# Patient Record
Sex: MALE | Race: WHITE | NOT HISPANIC OR LATINO | URBAN - METROPOLITAN AREA
[De-identification: names, ages, dates, MRNs, and addresses within clinical notes are randomized per-mention and may not be internally consistent; named-entity substitution may affect disease eponyms.]

---

## 2017-09-11 ENCOUNTER — EMERGENCY (EMERGENCY)
Facility: HOSPITAL | Age: 77
LOS: 0 days | Discharge: HOME | End: 2017-09-11
Attending: EMERGENCY MEDICINE | Admitting: EMERGENCY MEDICINE
Payer: MEDICARE

## 2017-09-11 VITALS
TEMPERATURE: 99 F | OXYGEN SATURATION: 98 % | DIASTOLIC BLOOD PRESSURE: 87 MMHG | RESPIRATION RATE: 17 BRPM | SYSTOLIC BLOOD PRESSURE: 167 MMHG | HEART RATE: 84 BPM

## 2017-09-11 VITALS — WEIGHT: 205.03 LBS | HEIGHT: 72 IN

## 2017-09-11 DIAGNOSIS — I25.10 ATHEROSCLEROTIC HEART DISEASE OF NATIVE CORONARY ARTERY WITHOUT ANGINA PECTORIS: ICD-10-CM

## 2017-09-11 DIAGNOSIS — N40.0 BENIGN PROSTATIC HYPERPLASIA WITHOUT LOWER URINARY TRACT SYMPTOMS: ICD-10-CM

## 2017-09-11 DIAGNOSIS — N13.2 HYDRONEPHROSIS WITH RENAL AND URETERAL CALCULOUS OBSTRUCTION: ICD-10-CM

## 2017-09-11 DIAGNOSIS — Z98.89 OTHER SPECIFIED POSTPROCEDURAL STATES: Chronic | ICD-10-CM

## 2017-09-11 DIAGNOSIS — Z98.49 CATARACT EXTRACTION STATUS, UNSPECIFIED EYE: Chronic | ICD-10-CM

## 2017-09-11 DIAGNOSIS — Z79.84 LONG TERM (CURRENT) USE OF ORAL HYPOGLYCEMIC DRUGS: ICD-10-CM

## 2017-09-11 DIAGNOSIS — Z79.899 OTHER LONG TERM (CURRENT) DRUG THERAPY: ICD-10-CM

## 2017-09-11 DIAGNOSIS — R10.84 GENERALIZED ABDOMINAL PAIN: ICD-10-CM

## 2017-09-11 DIAGNOSIS — I10 ESSENTIAL (PRIMARY) HYPERTENSION: ICD-10-CM

## 2017-09-11 DIAGNOSIS — Z95.5 PRESENCE OF CORONARY ANGIOPLASTY IMPLANT AND GRAFT: Chronic | ICD-10-CM

## 2017-09-11 DIAGNOSIS — E11.9 TYPE 2 DIABETES MELLITUS WITHOUT COMPLICATIONS: ICD-10-CM

## 2017-09-11 DIAGNOSIS — E78.5 HYPERLIPIDEMIA, UNSPECIFIED: ICD-10-CM

## 2017-09-11 LAB
ALBUMIN SERPL ELPH-MCNC: 3.7 G/DL — SIGNIFICANT CHANGE UP (ref 3.3–5)
ALP SERPL-CCNC: 87 U/L — SIGNIFICANT CHANGE UP (ref 40–120)
ALT FLD-CCNC: 19 U/L — SIGNIFICANT CHANGE UP (ref 12–78)
ANION GAP SERPL CALC-SCNC: 7 MMOL/L — SIGNIFICANT CHANGE UP (ref 5–17)
APPEARANCE UR: CLEAR — SIGNIFICANT CHANGE UP
AST SERPL-CCNC: 16 U/L — SIGNIFICANT CHANGE UP (ref 15–37)
BACTERIA # UR AUTO: (no result)
BASOPHILS # BLD AUTO: 0.1 K/UL — SIGNIFICANT CHANGE UP (ref 0–0.2)
BASOPHILS NFR BLD AUTO: 0.7 % — SIGNIFICANT CHANGE UP (ref 0–2)
BILIRUB SERPL-MCNC: 0.3 MG/DL — SIGNIFICANT CHANGE UP (ref 0.2–1.2)
BILIRUB UR-MCNC: NEGATIVE — SIGNIFICANT CHANGE UP
BUN SERPL-MCNC: 22 MG/DL — SIGNIFICANT CHANGE UP (ref 7–23)
CALCIUM SERPL-MCNC: 9.4 MG/DL — SIGNIFICANT CHANGE UP (ref 8.5–10.1)
CHLORIDE SERPL-SCNC: 107 MMOL/L — SIGNIFICANT CHANGE UP (ref 96–108)
CO2 SERPL-SCNC: 27 MMOL/L — SIGNIFICANT CHANGE UP (ref 22–31)
COLOR SPEC: YELLOW — SIGNIFICANT CHANGE UP
CREAT SERPL-MCNC: 1.21 MG/DL — SIGNIFICANT CHANGE UP (ref 0.5–1.3)
DIFF PNL FLD: (no result)
EOSINOPHIL # BLD AUTO: 0.1 K/UL — SIGNIFICANT CHANGE UP (ref 0–0.5)
EOSINOPHIL NFR BLD AUTO: 0.4 % — SIGNIFICANT CHANGE UP (ref 0–6)
EPI CELLS # UR: SIGNIFICANT CHANGE UP
GLUCOSE SERPL-MCNC: 178 MG/DL — HIGH (ref 70–99)
GLUCOSE UR QL: NEGATIVE MG/DL — SIGNIFICANT CHANGE UP
HCT VFR BLD CALC: 47 % — SIGNIFICANT CHANGE UP (ref 39–50)
HGB BLD-MCNC: 16 G/DL — SIGNIFICANT CHANGE UP (ref 13–17)
KETONES UR-MCNC: NEGATIVE — SIGNIFICANT CHANGE UP
LEUKOCYTE ESTERASE UR-ACNC: (no result)
LYMPHOCYTES # BLD AUTO: 1.5 K/UL — SIGNIFICANT CHANGE UP (ref 1–3.3)
LYMPHOCYTES # BLD AUTO: 11.5 % — LOW (ref 13–44)
MCHC RBC-ENTMCNC: 29.8 PG — SIGNIFICANT CHANGE UP (ref 27–34)
MCHC RBC-ENTMCNC: 34.1 GM/DL — SIGNIFICANT CHANGE UP (ref 32–36)
MCV RBC AUTO: 87.3 FL — SIGNIFICANT CHANGE UP (ref 80–100)
MONOCYTES # BLD AUTO: 0.9 K/UL — SIGNIFICANT CHANGE UP (ref 0–0.9)
MONOCYTES NFR BLD AUTO: 7.3 % — SIGNIFICANT CHANGE UP (ref 2–14)
NEUTROPHILS # BLD AUTO: 10.2 K/UL — HIGH (ref 1.8–7.4)
NEUTROPHILS NFR BLD AUTO: 80.1 % — HIGH (ref 43–77)
NITRITE UR-MCNC: NEGATIVE — SIGNIFICANT CHANGE UP
PH UR: 5 — SIGNIFICANT CHANGE UP (ref 5–8)
PLATELET # BLD AUTO: 193 K/UL — SIGNIFICANT CHANGE UP (ref 150–400)
POTASSIUM SERPL-MCNC: 4.3 MMOL/L — SIGNIFICANT CHANGE UP (ref 3.5–5.3)
POTASSIUM SERPL-SCNC: 4.3 MMOL/L — SIGNIFICANT CHANGE UP (ref 3.5–5.3)
PROT SERPL-MCNC: 7.6 GM/DL — SIGNIFICANT CHANGE UP (ref 6–8.3)
PROT UR-MCNC: 30 MG/DL
RBC # BLD: 5.39 M/UL — SIGNIFICANT CHANGE UP (ref 4.2–5.8)
RBC # FLD: 13.3 % — SIGNIFICANT CHANGE UP (ref 10.3–14.5)
RBC CASTS # UR COMP ASSIST: (no result) /HPF (ref 0–4)
SODIUM SERPL-SCNC: 141 MMOL/L — SIGNIFICANT CHANGE UP (ref 135–145)
SP GR SPEC: 1.02 — SIGNIFICANT CHANGE UP (ref 1.01–1.02)
UROBILINOGEN FLD QL: NEGATIVE MG/DL — SIGNIFICANT CHANGE UP
WBC # BLD: 12.8 K/UL — HIGH (ref 3.8–10.5)
WBC # FLD AUTO: 12.8 K/UL — HIGH (ref 3.8–10.5)
WBC UR QL: (no result)

## 2017-09-11 PROCEDURE — 99285 EMERGENCY DEPT VISIT HI MDM: CPT

## 2017-09-11 PROCEDURE — 74176 CT ABD & PELVIS W/O CONTRAST: CPT | Mod: 26

## 2017-09-11 RX ORDER — HYDROMORPHONE HYDROCHLORIDE 2 MG/ML
1 INJECTION INTRAMUSCULAR; INTRAVENOUS; SUBCUTANEOUS ONCE
Qty: 0 | Refills: 0 | Status: DISCONTINUED | OUTPATIENT
Start: 2017-09-11 | End: 2017-09-11

## 2017-09-11 RX ORDER — OXYCODONE HYDROCHLORIDE 5 MG/1
1 TABLET ORAL
Qty: 15 | Refills: 0 | OUTPATIENT
Start: 2017-09-11

## 2017-09-11 RX ORDER — OXYCODONE HYDROCHLORIDE 5 MG/1
5 TABLET ORAL ONCE
Qty: 0 | Refills: 0 | Status: DISCONTINUED | OUTPATIENT
Start: 2017-09-11 | End: 2017-09-11

## 2017-09-11 RX ORDER — ONDANSETRON 8 MG/1
1 TABLET, FILM COATED ORAL
Qty: 12 | Refills: 0
Start: 2017-09-11

## 2017-09-11 RX ORDER — SODIUM CHLORIDE 9 MG/ML
1000 INJECTION INTRAMUSCULAR; INTRAVENOUS; SUBCUTANEOUS ONCE
Qty: 0 | Refills: 0 | Status: COMPLETED | OUTPATIENT
Start: 2017-09-11 | End: 2017-09-11

## 2017-09-11 RX ORDER — CEFTRIAXONE 500 MG/1
1 INJECTION, POWDER, FOR SOLUTION INTRAMUSCULAR; INTRAVENOUS ONCE
Qty: 0 | Refills: 0 | Status: COMPLETED | OUTPATIENT
Start: 2017-09-11 | End: 2017-09-11

## 2017-09-11 RX ORDER — MORPHINE SULFATE 50 MG/1
4 CAPSULE, EXTENDED RELEASE ORAL ONCE
Qty: 0 | Refills: 0 | Status: DISCONTINUED | OUTPATIENT
Start: 2017-09-11 | End: 2017-09-11

## 2017-09-11 RX ORDER — ONDANSETRON 8 MG/1
4 TABLET, FILM COATED ORAL ONCE
Qty: 0 | Refills: 0 | Status: COMPLETED | OUTPATIENT
Start: 2017-09-11 | End: 2017-09-11

## 2017-09-11 RX ORDER — CEPHALEXIN 500 MG
1 CAPSULE ORAL
Qty: 28 | Refills: 0 | OUTPATIENT
Start: 2017-09-11 | End: 2017-09-18

## 2017-09-11 RX ADMIN — SODIUM CHLORIDE 1000 MILLILITER(S): 9 INJECTION INTRAMUSCULAR; INTRAVENOUS; SUBCUTANEOUS at 01:20

## 2017-09-11 RX ADMIN — ONDANSETRON 4 MILLIGRAM(S): 8 TABLET, FILM COATED ORAL at 01:23

## 2017-09-11 RX ADMIN — OXYCODONE HYDROCHLORIDE 5 MILLIGRAM(S): 5 TABLET ORAL at 04:57

## 2017-09-11 RX ADMIN — MORPHINE SULFATE 4 MILLIGRAM(S): 50 CAPSULE, EXTENDED RELEASE ORAL at 03:00

## 2017-09-11 RX ADMIN — HYDROMORPHONE HYDROCHLORIDE 1 MILLIGRAM(S): 2 INJECTION INTRAMUSCULAR; INTRAVENOUS; SUBCUTANEOUS at 03:00

## 2017-09-11 RX ADMIN — HYDROMORPHONE HYDROCHLORIDE 1 MILLIGRAM(S): 2 INJECTION INTRAMUSCULAR; INTRAVENOUS; SUBCUTANEOUS at 02:19

## 2017-09-11 RX ADMIN — MORPHINE SULFATE 4 MILLIGRAM(S): 50 CAPSULE, EXTENDED RELEASE ORAL at 01:22

## 2017-09-11 RX ADMIN — CEFTRIAXONE 100 GRAM(S): 500 INJECTION, POWDER, FOR SOLUTION INTRAMUSCULAR; INTRAVENOUS at 03:10

## 2017-09-11 NOTE — ED ADULT NURSE NOTE - OBJECTIVE STATEMENT
Pt with h/o kidney stones comes in c/o increasing left flank pain x 2 days - pain is 10/10 and radiates to front - pt also c/o nausea.  Denies hematuria or difficulty urinating

## 2017-09-11 NOTE — ED PROVIDER NOTE - GASTROINTESTINAL, MLM
Minimal tenderness to left abd and left flank, no guarding, rebound or peritoneal signs, no pulsating abd masses.

## 2017-09-11 NOTE — ED PROVIDER NOTE - MEDICAL DECISION MAKING DETAILS
Reevaluated patient at bedside.  Patient feeling much improved.  Discussed the results of all diagnostic testing in ED and copies of all reports given.   An opportunity to ask questions was given.  Discussed the importance of prompt, close medical follow-up with his urologist today.  Patient will return with any changes, concerns or persistent / worsening symptoms.  Understanding of all instructions verbalized.

## 2017-09-11 NOTE — ED PROVIDER NOTE - OBJECTIVE STATEMENT
75 y/o M with PMHx of renal calculi, BPH, HTN, HLD and DM presents with left flank pain that radiates to the front starting last night. Pt tried taking Dulcolax and had a BM and felt better before the pain returned. +nausea and diarrhea. Denies fever and vomiting. Pt takes Tramadol for pain. Former smoker, quit 36 years ago. NKDA. 77 y/o M with PMHx of renal calculi, BPH, HTN, HLD and DM presents with left flank pain that radiates to the front starting last night. Pt tried taking Dulcolax and had a BM and felt better before the pain returned. +nausea. Denies fever and vomiting. Pt takes Tramadol for pain. Former smoker, quit 36 years ago. NKDA.  Pt has a hx of renal colic with CT last month showing multiple b/l kidney stones. 75 y/o M with PMHx of renal calculi, BPH, HTN, HLD and DM presents with left flank pain that radiates to the front starting last night. Pt tried taking Dulcolax and had a BM and felt better before the pain returned. +nausea. Denies fever and vomiting. Pt takes Tramadol for pain. Former smoker, quit 36 years ago. NKDA.  Pt has a hx of renal colic with CT last month showing multiple b/l kidney stones. (urologist: Dia - [place])

## 2017-09-11 NOTE — ED ADULT NURSE REASSESSMENT NOTE - NS ED NURSE REASSESS COMMENT FT1
Pt states no relief from pain after morphine -Dr. Hoang aware -  given 1 mg IVP dilaudid as ordered - will continue to monitor.
Pt received in stretcher. Handoff given by DENISE Ventura. ALETHA&Ox3. MAEX4. Breathing spontaneously on RA. NO SOB or cough. VS as documented. ABD soft, nondistended, tender to touch. medicated as ordered and pt tolerated well. Pain med effective at this time. Ambulate freely. Skin warm, dry, and intact. ABX given as ordered. No reaction noted. Will continue to monitor.

## 2017-09-11 NOTE — ED ADULT TRIAGE NOTE - CHIEF COMPLAINT QUOTE
c/o left flank pain radiating to lower abd x 2 days. Denies v/f/d, dysuria, hematuria. Recent renal stones

## 2017-09-11 NOTE — ED PROVIDER NOTE - PMH
Aortic stenosis, mild  2008  BPH (benign prostatic hypertrophy)    CAD (coronary artery disease)    Hyperlipidemia    Hypertension    Renal calculus    Renal cyst    Spinal stenosis of lumbar region  s/p ZHOU x 2  Type 2 diabetes mellitus

## 2017-09-12 LAB
CULTURE RESULTS: NO GROWTH — SIGNIFICANT CHANGE UP
SPECIMEN SOURCE: SIGNIFICANT CHANGE UP

## 2017-09-14 ENCOUNTER — OUTPATIENT (OUTPATIENT)
Dept: OUTPATIENT SERVICES | Facility: HOSPITAL | Age: 77
LOS: 1 days | End: 2017-09-14
Payer: MEDICARE

## 2017-09-14 VITALS
DIASTOLIC BLOOD PRESSURE: 80 MMHG | HEART RATE: 69 BPM | OXYGEN SATURATION: 97 % | WEIGHT: 207.01 LBS | TEMPERATURE: 99 F | SYSTOLIC BLOOD PRESSURE: 125 MMHG | HEIGHT: 71 IN | RESPIRATION RATE: 20 BRPM

## 2017-09-14 DIAGNOSIS — Z01.818 ENCOUNTER FOR OTHER PREPROCEDURAL EXAMINATION: ICD-10-CM

## 2017-09-14 DIAGNOSIS — N20.0 CALCULUS OF KIDNEY: ICD-10-CM

## 2017-09-14 DIAGNOSIS — Z98.49 CATARACT EXTRACTION STATUS, UNSPECIFIED EYE: Chronic | ICD-10-CM

## 2017-09-14 DIAGNOSIS — Z98.89 OTHER SPECIFIED POSTPROCEDURAL STATES: Chronic | ICD-10-CM

## 2017-09-14 DIAGNOSIS — Z98.1 ARTHRODESIS STATUS: Chronic | ICD-10-CM

## 2017-09-14 DIAGNOSIS — I25.10 ATHEROSCLEROTIC HEART DISEASE OF NATIVE CORONARY ARTERY WITHOUT ANGINA PECTORIS: ICD-10-CM

## 2017-09-14 DIAGNOSIS — Z95.5 PRESENCE OF CORONARY ANGIOPLASTY IMPLANT AND GRAFT: Chronic | ICD-10-CM

## 2017-09-14 PROCEDURE — G0463: CPT

## 2017-09-14 NOTE — H&P PST ADULT - PMH
Aortic stenosis, mild  2008  BPH (benign prostatic hypertrophy)    CAD (coronary artery disease)    Hyperlipidemia    Hypertension    Kidney stone    Renal calculus    Renal cyst    Spinal stenosis of lumbar region  s/p ZHOU x 2  Type 2 diabetes mellitus Aortic stenosis, mild  2008  BPH (benign prostatic hypertrophy)    CAD (coronary artery disease)    Hyperlipidemia    Hypertension    Renal calculus    Renal cyst    Spinal stenosis of lumbar region  s/p ZHOU x 2  Type 2 diabetes mellitus

## 2017-09-14 NOTE — H&P PST ADULT - PSH
H/O lithotripsy  2013  S/P cataract surgery  bilaterally  S/P lumbar spinal fusion    S/P T&A (status post tonsillectomy and adenoidectomy)    Stented coronary artery  2/28/08 OM2 x 1; 3/13/2008 x 3 stents

## 2017-09-14 NOTE — H&P PST ADULT - NSANTHOSAYNRD_GEN_A_CORE
No. EDGARDO screening performed.  STOP BANG Legend: 0-2 = LOW Risk; 3-4 = INTERMEDIATE Risk; 5-8 = HIGH Risk

## 2017-09-14 NOTE — H&P PST ADULT - HISTORY OF PRESENT ILLNESS
77 yo male h/o CAD ( s/p 2 stents 2008), HTN, BPH, T2DM, found to have kidney stones, seen in [place] ER on 9/11/17, now scheduled for ESWl 9/21/17.

## 2017-09-18 ENCOUNTER — EMERGENCY (EMERGENCY)
Facility: HOSPITAL | Age: 77
LOS: 0 days | Discharge: HOME | End: 2017-09-18
Attending: EMERGENCY MEDICINE | Admitting: EMERGENCY MEDICINE
Payer: MEDICARE

## 2017-09-18 VITALS
TEMPERATURE: 99 F | HEART RATE: 73 BPM | RESPIRATION RATE: 17 BRPM | DIASTOLIC BLOOD PRESSURE: 96 MMHG | OXYGEN SATURATION: 99 % | SYSTOLIC BLOOD PRESSURE: 171 MMHG

## 2017-09-18 VITALS — WEIGHT: 205.03 LBS | HEIGHT: 73 IN

## 2017-09-18 DIAGNOSIS — Z98.1 ARTHRODESIS STATUS: Chronic | ICD-10-CM

## 2017-09-18 DIAGNOSIS — Z95.5 PRESENCE OF CORONARY ANGIOPLASTY IMPLANT AND GRAFT: Chronic | ICD-10-CM

## 2017-09-18 DIAGNOSIS — Z98.89 OTHER SPECIFIED POSTPROCEDURAL STATES: Chronic | ICD-10-CM

## 2017-09-18 DIAGNOSIS — Z98.49 CATARACT EXTRACTION STATUS, UNSPECIFIED EYE: Chronic | ICD-10-CM

## 2017-09-18 PROCEDURE — 99285 EMERGENCY DEPT VISIT HI MDM: CPT

## 2017-09-18 RX ORDER — ONDANSETRON 8 MG/1
4 TABLET, FILM COATED ORAL ONCE
Qty: 0 | Refills: 0 | Status: DISCONTINUED | OUTPATIENT
Start: 2017-09-18 | End: 2017-09-18

## 2017-09-18 RX ORDER — MORPHINE SULFATE 50 MG/1
4 CAPSULE, EXTENDED RELEASE ORAL ONCE
Qty: 0 | Refills: 0 | Status: DISCONTINUED | OUTPATIENT
Start: 2017-09-18 | End: 2017-09-18

## 2017-09-18 RX ORDER — SODIUM CHLORIDE 9 MG/ML
3 INJECTION INTRAMUSCULAR; INTRAVENOUS; SUBCUTANEOUS ONCE
Qty: 0 | Refills: 0 | Status: DISCONTINUED | OUTPATIENT
Start: 2017-09-18 | End: 2017-09-18

## 2017-09-18 NOTE — ED STATDOCS - PROGRESS NOTE DETAILS
RAHAT Langford:   Patient has been seen, evaluated and orders have been written by the attending in intake. Patient is stable.  I will follow up the results of orders written and I will continue to evaluate/observe the patient.  will attempt to call urologist Dr. Oconnell [phone] I spoke with Dr. Oconnell - patient to be discharged to proceed to [facility] ED for admission.  Elizabeth Langford PA-C

## 2017-09-18 NOTE — ED ADULT TRIAGE NOTE - CHIEF COMPLAINT QUOTE
pt c/o left flank and left upper abd pain last sunday. seen in ED dx w/ 5mm kidney stone, has been unable to pass stone. now in severe pain unresolved by oxycodone.

## 2017-09-18 NOTE — ED STATDOCS - OBJECTIVE STATEMENT
77 y/o male pmhx HTN, DM presents to ED due to left flank pain. c/o left upper abd pain since last Sunday. Pt was seen in ED  and dx w/ 5mm kidney stone. He has been unable to pass stone. Pt has been taking oxycodone for pain w/ no relief of sx. Has an appointment for lithotripsy on Thursday, but states pain is unbearable and can not wait. PMD Yudith

## 2017-09-18 NOTE — ED ADULT NURSE NOTE - OBJECTIVE STATEMENT
Pt has known kidney stone with left flank pain. Pt reports that home Vicodin is not sufficient relief.  Pt reports some intermittent difficulty voiding, but reports no difficulty at this time.

## 2017-09-20 DIAGNOSIS — Z79.899 OTHER LONG TERM (CURRENT) DRUG THERAPY: ICD-10-CM

## 2017-09-20 DIAGNOSIS — N40.0 BENIGN PROSTATIC HYPERPLASIA WITHOUT LOWER URINARY TRACT SYMPTOMS: ICD-10-CM

## 2017-09-20 DIAGNOSIS — I25.10 ATHEROSCLEROTIC HEART DISEASE OF NATIVE CORONARY ARTERY WITHOUT ANGINA PECTORIS: ICD-10-CM

## 2017-09-20 DIAGNOSIS — N13.2 HYDRONEPHROSIS WITH RENAL AND URETERAL CALCULOUS OBSTRUCTION: ICD-10-CM

## 2017-09-20 DIAGNOSIS — I10 ESSENTIAL (PRIMARY) HYPERTENSION: ICD-10-CM

## 2017-09-20 DIAGNOSIS — E78.5 HYPERLIPIDEMIA, UNSPECIFIED: ICD-10-CM

## 2017-09-20 DIAGNOSIS — Z79.84 LONG TERM (CURRENT) USE OF ORAL HYPOGLYCEMIC DRUGS: ICD-10-CM

## 2017-09-20 DIAGNOSIS — Z79.82 LONG TERM (CURRENT) USE OF ASPIRIN: ICD-10-CM

## 2017-09-20 DIAGNOSIS — R10.31 RIGHT LOWER QUADRANT PAIN: ICD-10-CM

## 2017-09-20 DIAGNOSIS — E11.9 TYPE 2 DIABETES MELLITUS WITHOUT COMPLICATIONS: ICD-10-CM

## 2018-01-29 ENCOUNTER — EMERGENCY (EMERGENCY)
Facility: HOSPITAL | Age: 78
LOS: 0 days | Discharge: HOME | End: 2018-01-29
Attending: EMERGENCY MEDICINE | Admitting: EMERGENCY MEDICINE
Payer: MEDICARE

## 2018-01-29 VITALS
DIASTOLIC BLOOD PRESSURE: 96 MMHG | OXYGEN SATURATION: 96 % | RESPIRATION RATE: 16 BRPM | SYSTOLIC BLOOD PRESSURE: 162 MMHG | TEMPERATURE: 98 F | HEART RATE: 73 BPM

## 2018-01-29 VITALS — WEIGHT: 210.1 LBS

## 2018-01-29 DIAGNOSIS — Z79.84 LONG TERM (CURRENT) USE OF ORAL HYPOGLYCEMIC DRUGS: ICD-10-CM

## 2018-01-29 DIAGNOSIS — Z98.89 OTHER SPECIFIED POSTPROCEDURAL STATES: Chronic | ICD-10-CM

## 2018-01-29 DIAGNOSIS — E78.5 HYPERLIPIDEMIA, UNSPECIFIED: ICD-10-CM

## 2018-01-29 DIAGNOSIS — Z79.899 OTHER LONG TERM (CURRENT) DRUG THERAPY: ICD-10-CM

## 2018-01-29 DIAGNOSIS — I25.10 ATHEROSCLEROTIC HEART DISEASE OF NATIVE CORONARY ARTERY WITHOUT ANGINA PECTORIS: ICD-10-CM

## 2018-01-29 DIAGNOSIS — Z95.5 PRESENCE OF CORONARY ANGIOPLASTY IMPLANT AND GRAFT: Chronic | ICD-10-CM

## 2018-01-29 DIAGNOSIS — Z98.1 ARTHRODESIS STATUS: Chronic | ICD-10-CM

## 2018-01-29 DIAGNOSIS — Z98.49 CATARACT EXTRACTION STATUS, UNSPECIFIED EYE: Chronic | ICD-10-CM

## 2018-01-29 DIAGNOSIS — E11.9 TYPE 2 DIABETES MELLITUS WITHOUT COMPLICATIONS: ICD-10-CM

## 2018-01-29 DIAGNOSIS — N40.0 BENIGN PROSTATIC HYPERPLASIA WITHOUT LOWER URINARY TRACT SYMPTOMS: ICD-10-CM

## 2018-01-29 DIAGNOSIS — N13.2 HYDRONEPHROSIS WITH RENAL AND URETERAL CALCULOUS OBSTRUCTION: ICD-10-CM

## 2018-01-29 DIAGNOSIS — R10.11 RIGHT UPPER QUADRANT PAIN: ICD-10-CM

## 2018-01-29 DIAGNOSIS — I10 ESSENTIAL (PRIMARY) HYPERTENSION: ICD-10-CM

## 2018-01-29 LAB
ALBUMIN SERPL ELPH-MCNC: 3.9 G/DL — SIGNIFICANT CHANGE UP (ref 3.3–5)
ALP SERPL-CCNC: 77 U/L — SIGNIFICANT CHANGE UP (ref 40–120)
ALT FLD-CCNC: 24 U/L — SIGNIFICANT CHANGE UP (ref 12–78)
ANION GAP SERPL CALC-SCNC: 8 MMOL/L — SIGNIFICANT CHANGE UP (ref 5–17)
APPEARANCE UR: (no result)
APTT BLD: 26.8 SEC — LOW (ref 27.5–37.4)
AST SERPL-CCNC: 15 U/L — SIGNIFICANT CHANGE UP (ref 15–37)
BACTERIA # UR AUTO: (no result)
BASOPHILS # BLD AUTO: 0.1 K/UL — SIGNIFICANT CHANGE UP (ref 0–0.2)
BASOPHILS NFR BLD AUTO: 1.3 % — SIGNIFICANT CHANGE UP (ref 0–2)
BILIRUB SERPL-MCNC: 0.3 MG/DL — SIGNIFICANT CHANGE UP (ref 0.2–1.2)
BILIRUB UR-MCNC: NEGATIVE — SIGNIFICANT CHANGE UP
BUN SERPL-MCNC: 22 MG/DL — SIGNIFICANT CHANGE UP (ref 7–23)
CALCIUM SERPL-MCNC: 9.1 MG/DL — SIGNIFICANT CHANGE UP (ref 8.5–10.1)
CHLORIDE SERPL-SCNC: 106 MMOL/L — SIGNIFICANT CHANGE UP (ref 96–108)
CO2 SERPL-SCNC: 26 MMOL/L — SIGNIFICANT CHANGE UP (ref 22–31)
COLOR SPEC: (no result)
COMMENT - URINE: SIGNIFICANT CHANGE UP
CREAT SERPL-MCNC: 1.14 MG/DL — SIGNIFICANT CHANGE UP (ref 0.5–1.3)
DIFF PNL FLD: (no result)
EOSINOPHIL # BLD AUTO: 0.1 K/UL — SIGNIFICANT CHANGE UP (ref 0–0.5)
EOSINOPHIL NFR BLD AUTO: 1.6 % — SIGNIFICANT CHANGE UP (ref 0–6)
EPI CELLS # UR: SIGNIFICANT CHANGE UP
GLUCOSE SERPL-MCNC: 168 MG/DL — HIGH (ref 70–99)
GLUCOSE UR QL: NEGATIVE MG/DL — SIGNIFICANT CHANGE UP
HCT VFR BLD CALC: 46.1 % — SIGNIFICANT CHANGE UP (ref 39–50)
HGB BLD-MCNC: 15.1 G/DL — SIGNIFICANT CHANGE UP (ref 13–17)
INR BLD: 0.93 RATIO — SIGNIFICANT CHANGE UP (ref 0.88–1.16)
KETONES UR-MCNC: NEGATIVE — SIGNIFICANT CHANGE UP
LEUKOCYTE ESTERASE UR-ACNC: (no result)
LYMPHOCYTES # BLD AUTO: 1.8 K/UL — SIGNIFICANT CHANGE UP (ref 1–3.3)
LYMPHOCYTES # BLD AUTO: 25.4 % — SIGNIFICANT CHANGE UP (ref 13–44)
MCHC RBC-ENTMCNC: 29.4 PG — SIGNIFICANT CHANGE UP (ref 27–34)
MCHC RBC-ENTMCNC: 32.8 GM/DL — SIGNIFICANT CHANGE UP (ref 32–36)
MCV RBC AUTO: 89.7 FL — SIGNIFICANT CHANGE UP (ref 80–100)
MONOCYTES # BLD AUTO: 0.5 K/UL — SIGNIFICANT CHANGE UP (ref 0–0.9)
MONOCYTES NFR BLD AUTO: 7.6 % — SIGNIFICANT CHANGE UP (ref 2–14)
NEUTROPHILS # BLD AUTO: 4.7 K/UL — SIGNIFICANT CHANGE UP (ref 1.8–7.4)
NEUTROPHILS NFR BLD AUTO: 64.1 % — SIGNIFICANT CHANGE UP (ref 43–77)
NITRITE UR-MCNC: NEGATIVE — SIGNIFICANT CHANGE UP
PH UR: 5 — SIGNIFICANT CHANGE UP (ref 5–8)
PLATELET # BLD AUTO: 176 K/UL — SIGNIFICANT CHANGE UP (ref 150–400)
POTASSIUM SERPL-MCNC: 4.4 MMOL/L — SIGNIFICANT CHANGE UP (ref 3.5–5.3)
POTASSIUM SERPL-SCNC: 4.4 MMOL/L — SIGNIFICANT CHANGE UP (ref 3.5–5.3)
PROT SERPL-MCNC: 8.1 GM/DL — SIGNIFICANT CHANGE UP (ref 6–8.3)
PROT UR-MCNC: 30 MG/DL
PROTHROM AB SERPL-ACNC: 10 SEC — SIGNIFICANT CHANGE UP (ref 9.8–12.7)
RBC # BLD: 5.14 M/UL — SIGNIFICANT CHANGE UP (ref 4.2–5.8)
RBC # FLD: 14 % — SIGNIFICANT CHANGE UP (ref 10.3–14.5)
RBC CASTS # UR COMP ASSIST: (no result) /HPF (ref 0–4)
SODIUM SERPL-SCNC: 140 MMOL/L — SIGNIFICANT CHANGE UP (ref 135–145)
SP GR SPEC: 1.01 — SIGNIFICANT CHANGE UP (ref 1.01–1.02)
UROBILINOGEN FLD QL: NEGATIVE MG/DL — SIGNIFICANT CHANGE UP
WBC # BLD: 7.3 K/UL — SIGNIFICANT CHANGE UP (ref 3.8–10.5)
WBC # FLD AUTO: 7.3 K/UL — SIGNIFICANT CHANGE UP (ref 3.8–10.5)
WBC UR QL: >50

## 2018-01-29 PROCEDURE — 74176 CT ABD & PELVIS W/O CONTRAST: CPT | Mod: 26

## 2018-01-29 PROCEDURE — 99285 EMERGENCY DEPT VISIT HI MDM: CPT

## 2018-01-29 RX ORDER — ONDANSETRON 8 MG/1
4 TABLET, FILM COATED ORAL ONCE
Qty: 0 | Refills: 0 | Status: COMPLETED | OUTPATIENT
Start: 2018-01-29 | End: 2018-01-29

## 2018-01-29 RX ORDER — OXYCODONE AND ACETAMINOPHEN 5; 325 MG/1; MG/1
1 TABLET ORAL ONCE
Qty: 0 | Refills: 0 | Status: DISCONTINUED | OUTPATIENT
Start: 2018-01-29 | End: 2018-01-29

## 2018-01-29 RX ORDER — TAMSULOSIN HYDROCHLORIDE 0.4 MG/1
1 CAPSULE ORAL
Qty: 14 | Refills: 0
Start: 2018-01-29

## 2018-01-29 RX ORDER — SODIUM CHLORIDE 9 MG/ML
1000 INJECTION INTRAMUSCULAR; INTRAVENOUS; SUBCUTANEOUS ONCE
Qty: 0 | Refills: 0 | Status: COMPLETED | OUTPATIENT
Start: 2018-01-29 | End: 2018-01-29

## 2018-01-29 RX ORDER — SODIUM CHLORIDE 9 MG/ML
3 INJECTION INTRAMUSCULAR; INTRAVENOUS; SUBCUTANEOUS ONCE
Qty: 0 | Refills: 0 | Status: COMPLETED | OUTPATIENT
Start: 2018-01-29 | End: 2018-01-29

## 2018-01-29 RX ORDER — MORPHINE SULFATE 50 MG/1
6 CAPSULE, EXTENDED RELEASE ORAL ONCE
Qty: 0 | Refills: 0 | Status: DISCONTINUED | OUTPATIENT
Start: 2018-01-29 | End: 2018-01-29

## 2018-01-29 RX ORDER — MORPHINE SULFATE 50 MG/1
4 CAPSULE, EXTENDED RELEASE ORAL ONCE
Qty: 0 | Refills: 0 | Status: DISCONTINUED | OUTPATIENT
Start: 2018-01-29 | End: 2018-01-29

## 2018-01-29 RX ADMIN — SODIUM CHLORIDE 1000 MILLILITER(S): 9 INJECTION INTRAMUSCULAR; INTRAVENOUS; SUBCUTANEOUS at 10:03

## 2018-01-29 RX ADMIN — MORPHINE SULFATE 4 MILLIGRAM(S): 50 CAPSULE, EXTENDED RELEASE ORAL at 11:03

## 2018-01-29 RX ADMIN — SODIUM CHLORIDE 3 MILLILITER(S): 9 INJECTION INTRAMUSCULAR; INTRAVENOUS; SUBCUTANEOUS at 10:03

## 2018-01-29 RX ADMIN — MORPHINE SULFATE 6 MILLIGRAM(S): 50 CAPSULE, EXTENDED RELEASE ORAL at 10:03

## 2018-01-29 RX ADMIN — OXYCODONE AND ACETAMINOPHEN 1 TABLET(S): 5; 325 TABLET ORAL at 11:32

## 2018-01-29 RX ADMIN — ONDANSETRON 4 MILLIGRAM(S): 8 TABLET, FILM COATED ORAL at 10:03

## 2018-01-29 RX ADMIN — SODIUM CHLORIDE 1000 MILLILITER(S): 9 INJECTION INTRAMUSCULAR; INTRAVENOUS; SUBCUTANEOUS at 11:32

## 2018-01-29 NOTE — ED ADULT TRIAGE NOTE - CHIEF COMPLAINT QUOTE
pt heaving right flank pian ,blood in the urine. denies chills ,fever no burning on urination .h/o kidney stone. on baby asa .

## 2018-01-29 NOTE — ED PROVIDER NOTE - OBJECTIVE STATEMENT
78 y/o with PMHx of DM, BPH, HLD, HTN, Kidney stones presents to ED c/o constant R flank pain. Pt states that pain began yesterday afternoon, and has worsened since waking this morning. Associated sx include hematuria, HA, SOB, diarrhea. Pt denies NV, diaphoresis, CP, numbness to arms or legs, or other acute c/o at this time.

## 2018-01-29 NOTE — ED ADULT NURSE NOTE - OBJECTIVE STATEMENT
Patient c/o abdominal pain since this morning, right flank, reports bloody urine. Reports hx of kidney stones in September, had surgery to get them removed.

## 2018-01-29 NOTE — ED ADULT NURSE NOTE - CAS EDN DISCHARGE ASSESSMENT
Alert and oriented to person, place and time/Symptoms improved/Dressing clean and dry/Awake/No adverse reaction to first time med in ED

## 2021-05-11 NOTE — ED ADULT NURSE NOTE - BREATH SOUNDS, MLM
Clear 76 year old male with h/o HTN, HLD,  DDD, spinal stenosis  s/p of permanent spinal cord stimulator (2017) AAA (2005) Pt c/o left groin pain x 3 months s/p vascular evaluation 76 year old male with h/o HTN, HLD,  DDD, spinal stenosis  s/p of permanent spinal cord stimulator (2017), AAA (s/p repair 2007) Pt c/o left groin pain x 3 months s/p vascular evaluation-s/p MRI/ CTA revealed-hypogastric artery aneurysm. 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

## 2021-05-24 ENCOUNTER — INPATIENT (INPATIENT)
Facility: HOSPITAL | Age: 81
LOS: 1 days | Discharge: HOME | DRG: 690 | End: 2021-05-26
Attending: HOSPITALIST | Admitting: HOSPITALIST
Payer: MEDICARE

## 2021-05-24 VITALS — HEIGHT: 72 IN | WEIGHT: 210.1 LBS

## 2021-05-24 DIAGNOSIS — I35.0 NONRHEUMATIC AORTIC (VALVE) STENOSIS: ICD-10-CM

## 2021-05-24 DIAGNOSIS — Z95.5 PRESENCE OF CORONARY ANGIOPLASTY IMPLANT AND GRAFT: Chronic | ICD-10-CM

## 2021-05-24 DIAGNOSIS — E78.5 HYPERLIPIDEMIA, UNSPECIFIED: ICD-10-CM

## 2021-05-24 DIAGNOSIS — I25.10 ATHEROSCLEROTIC HEART DISEASE OF NATIVE CORONARY ARTERY WITHOUT ANGINA PECTORIS: ICD-10-CM

## 2021-05-24 DIAGNOSIS — E11.9 TYPE 2 DIABETES MELLITUS WITHOUT COMPLICATIONS: ICD-10-CM

## 2021-05-24 DIAGNOSIS — R78.81 BACTEREMIA: ICD-10-CM

## 2021-05-24 DIAGNOSIS — Z98.49 CATARACT EXTRACTION STATUS, UNSPECIFIED EYE: Chronic | ICD-10-CM

## 2021-05-24 DIAGNOSIS — B96.1 KLEBSIELLA PNEUMONIAE [K. PNEUMONIAE] AS THE CAUSE OF DISEASES CLASSIFIED ELSEWHERE: ICD-10-CM

## 2021-05-24 DIAGNOSIS — N30.01 ACUTE CYSTITIS WITH HEMATURIA: ICD-10-CM

## 2021-05-24 DIAGNOSIS — D64.9 ANEMIA, UNSPECIFIED: ICD-10-CM

## 2021-05-24 DIAGNOSIS — Z98.89 OTHER SPECIFIED POSTPROCEDURAL STATES: Chronic | ICD-10-CM

## 2021-05-24 DIAGNOSIS — I10 ESSENTIAL (PRIMARY) HYPERTENSION: ICD-10-CM

## 2021-05-24 DIAGNOSIS — Z98.1 ARTHRODESIS STATUS: Chronic | ICD-10-CM

## 2021-05-24 DIAGNOSIS — N40.0 BENIGN PROSTATIC HYPERPLASIA WITHOUT LOWER URINARY TRACT SYMPTOMS: ICD-10-CM

## 2021-05-24 DIAGNOSIS — Z87.442 PERSONAL HISTORY OF URINARY CALCULI: ICD-10-CM
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
## 2021-05-24 DIAGNOSIS — Z95.5 PRESENCE OF CORONARY ANGIOPLASTY IMPLANT AND GRAFT: ICD-10-CM
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
## 2021-05-24 DIAGNOSIS — Z98.1 ARTHRODESIS STATUS: ICD-10-CM

## 2021-05-24 LAB
ALBUMIN SERPL ELPH-MCNC: 3.1 G/DL — LOW (ref 3.3–5)
ALP SERPL-CCNC: 73 U/L — SIGNIFICANT CHANGE UP (ref 40–120)
ALT FLD-CCNC: 15 U/L — SIGNIFICANT CHANGE UP (ref 12–78)
ANION GAP SERPL CALC-SCNC: 6 MMOL/L — SIGNIFICANT CHANGE UP (ref 5–17)
APPEARANCE UR: ABNORMAL
AST SERPL-CCNC: 11 U/L — LOW (ref 15–37)
BASOPHILS # BLD AUTO: 0.03 K/UL — SIGNIFICANT CHANGE UP (ref 0–0.2)
BASOPHILS NFR BLD AUTO: 0.4 % — SIGNIFICANT CHANGE UP (ref 0–2)
BILIRUB SERPL-MCNC: 0.7 MG/DL — SIGNIFICANT CHANGE UP (ref 0.2–1.2)
BILIRUB UR-MCNC: NEGATIVE — SIGNIFICANT CHANGE UP
BUN SERPL-MCNC: 19 MG/DL — SIGNIFICANT CHANGE UP (ref 7–23)
CALCIUM SERPL-MCNC: 9.6 MG/DL — SIGNIFICANT CHANGE UP (ref 8.5–10.1)
CHLORIDE SERPL-SCNC: 101 MMOL/L — SIGNIFICANT CHANGE UP (ref 96–108)
CO2 SERPL-SCNC: 28 MMOL/L — SIGNIFICANT CHANGE UP (ref 22–31)
COLOR SPEC: YELLOW — SIGNIFICANT CHANGE UP
CREAT SERPL-MCNC: 1.19 MG/DL — SIGNIFICANT CHANGE UP (ref 0.5–1.3)
DIFF PNL FLD: ABNORMAL
EOSINOPHIL # BLD AUTO: 0.03 K/UL — SIGNIFICANT CHANGE UP (ref 0–0.5)
EOSINOPHIL NFR BLD AUTO: 0.4 % — SIGNIFICANT CHANGE UP (ref 0–6)
GLUCOSE SERPL-MCNC: 231 MG/DL — HIGH (ref 70–99)
GLUCOSE UR QL: 250 MG/DL
HCT VFR BLD CALC: 38.8 % — LOW (ref 39–50)
HGB BLD-MCNC: 12.7 G/DL — LOW (ref 13–17)
IMM GRANULOCYTES NFR BLD AUTO: 0.7 % — SIGNIFICANT CHANGE UP (ref 0–1.5)
KETONES UR-MCNC: ABNORMAL
LACTATE SERPL-SCNC: 1.4 MMOL/L — SIGNIFICANT CHANGE UP (ref 0.7–2)
LEUKOCYTE ESTERASE UR-ACNC: ABNORMAL
LIDOCAIN IGE QN: 61 U/L — LOW (ref 73–393)
LYMPHOCYTES # BLD AUTO: 0.74 K/UL — LOW (ref 1–3.3)
LYMPHOCYTES # BLD AUTO: 9.7 % — LOW (ref 13–44)
MCHC RBC-ENTMCNC: 29.1 PG — SIGNIFICANT CHANGE UP (ref 27–34)
MCHC RBC-ENTMCNC: 32.7 GM/DL — SIGNIFICANT CHANGE UP (ref 32–36)
MCV RBC AUTO: 89 FL — SIGNIFICANT CHANGE UP (ref 80–100)
MONOCYTES # BLD AUTO: 0.73 K/UL — SIGNIFICANT CHANGE UP (ref 0–0.9)
MONOCYTES NFR BLD AUTO: 9.6 % — SIGNIFICANT CHANGE UP (ref 2–14)
NEUTROPHILS # BLD AUTO: 6.05 K/UL — SIGNIFICANT CHANGE UP (ref 1.8–7.4)
NEUTROPHILS NFR BLD AUTO: 79.2 % — HIGH (ref 43–77)
NITRITE UR-MCNC: POSITIVE
PH UR: 5 — SIGNIFICANT CHANGE UP (ref 5–8)
PLATELET # BLD AUTO: 162 K/UL — SIGNIFICANT CHANGE UP (ref 150–400)
POTASSIUM SERPL-MCNC: 3.7 MMOL/L — SIGNIFICANT CHANGE UP (ref 3.5–5.3)
POTASSIUM SERPL-SCNC: 3.7 MMOL/L — SIGNIFICANT CHANGE UP (ref 3.5–5.3)
PROT SERPL-MCNC: 7.8 GM/DL — SIGNIFICANT CHANGE UP (ref 6–8.3)
PROT UR-MCNC: 500 MG/DL
RBC # BLD: 4.36 M/UL — SIGNIFICANT CHANGE UP (ref 4.2–5.8)
RBC # FLD: 14.5 % — SIGNIFICANT CHANGE UP (ref 10.3–14.5)
SARS-COV-2 RNA SPEC QL NAA+PROBE: SIGNIFICANT CHANGE UP
SODIUM SERPL-SCNC: 135 MMOL/L — SIGNIFICANT CHANGE UP (ref 135–145)
SP GR SPEC: 1.01 — SIGNIFICANT CHANGE UP (ref 1.01–1.02)
UROBILINOGEN FLD QL: NEGATIVE MG/DL — SIGNIFICANT CHANGE UP
WBC # BLD: 7.63 K/UL — SIGNIFICANT CHANGE UP (ref 3.8–10.5)
WBC # FLD AUTO: 7.63 K/UL — SIGNIFICANT CHANGE UP (ref 3.8–10.5)

## 2021-05-24 PROCEDURE — 96374 THER/PROPH/DIAG INJ IV PUSH: CPT

## 2021-05-24 PROCEDURE — 86769 SARS-COV-2 COVID-19 ANTIBODY: CPT

## 2021-05-24 PROCEDURE — 99222 1ST HOSP IP/OBS MODERATE 55: CPT

## 2021-05-24 PROCEDURE — 99285 EMERGENCY DEPT VISIT HI MDM: CPT | Mod: CS

## 2021-05-24 PROCEDURE — 36415 COLL VENOUS BLD VENIPUNCTURE: CPT

## 2021-05-24 PROCEDURE — 85025 COMPLETE CBC W/AUTO DIFF WBC: CPT

## 2021-05-24 PROCEDURE — 74177 CT ABD & PELVIS W/CONTRAST: CPT | Mod: 26,MA

## 2021-05-24 PROCEDURE — 96375 TX/PRO/DX INJ NEW DRUG ADDON: CPT

## 2021-05-24 PROCEDURE — 83036 HEMOGLOBIN GLYCOSYLATED A1C: CPT

## 2021-05-24 PROCEDURE — 99285 EMERGENCY DEPT VISIT HI MDM: CPT | Mod: 25

## 2021-05-24 PROCEDURE — 82962 GLUCOSE BLOOD TEST: CPT

## 2021-05-24 PROCEDURE — 80048 BASIC METABOLIC PNL TOTAL CA: CPT

## 2021-05-24 PROCEDURE — 93010 ELECTROCARDIOGRAM REPORT: CPT

## 2021-05-24 RX ORDER — MORPHINE SULFATE 50 MG/1
4 CAPSULE, EXTENDED RELEASE ORAL ONCE
Refills: 0 | Status: DISCONTINUED | OUTPATIENT
Start: 2021-05-24 | End: 2021-05-25

## 2021-05-24 RX ORDER — DEXTROSE 50 % IN WATER 50 %
25 SYRINGE (ML) INTRAVENOUS ONCE
Refills: 0 | Status: DISCONTINUED | OUTPATIENT
Start: 2021-05-24 | End: 2021-05-26

## 2021-05-24 RX ORDER — TRAMADOL HYDROCHLORIDE 50 MG/1
50 TABLET ORAL EVERY 8 HOURS
Refills: 0 | Status: DISCONTINUED | OUTPATIENT
Start: 2021-05-24 | End: 2021-05-26

## 2021-05-24 RX ORDER — GLUCAGON INJECTION, SOLUTION 0.5 MG/.1ML
1 INJECTION, SOLUTION SUBCUTANEOUS ONCE
Refills: 0 | Status: DISCONTINUED | OUTPATIENT
Start: 2021-05-24 | End: 2021-05-26

## 2021-05-24 RX ORDER — ATORVASTATIN CALCIUM 80 MG/1
40 TABLET, FILM COATED ORAL AT BEDTIME
Refills: 0 | Status: DISCONTINUED | OUTPATIENT
Start: 2021-05-24 | End: 2021-05-26

## 2021-05-24 RX ORDER — DEXTROSE 50 % IN WATER 50 %
12.5 SYRINGE (ML) INTRAVENOUS ONCE
Refills: 0 | Status: DISCONTINUED | OUTPATIENT
Start: 2021-05-24 | End: 2021-05-26

## 2021-05-24 RX ORDER — KETOROLAC TROMETHAMINE 30 MG/ML
30 SYRINGE (ML) INJECTION ONCE
Refills: 0 | Status: DISCONTINUED | OUTPATIENT
Start: 2021-05-24 | End: 2021-05-24

## 2021-05-24 RX ORDER — SODIUM CHLORIDE 9 MG/ML
1000 INJECTION, SOLUTION INTRAVENOUS
Refills: 0 | Status: DISCONTINUED | OUTPATIENT
Start: 2021-05-24 | End: 2021-05-26

## 2021-05-24 RX ORDER — HYDROCHLOROTHIAZIDE 25 MG
12.5 TABLET ORAL DAILY
Refills: 0 | Status: DISCONTINUED | OUTPATIENT
Start: 2021-05-24 | End: 2021-05-26

## 2021-05-24 RX ORDER — ACETAMINOPHEN 500 MG
650 TABLET ORAL EVERY 6 HOURS
Refills: 0 | Status: DISCONTINUED | OUTPATIENT
Start: 2021-05-24 | End: 2021-05-26

## 2021-05-24 RX ORDER — SODIUM CHLORIDE 9 MG/ML
1000 INJECTION INTRAMUSCULAR; INTRAVENOUS; SUBCUTANEOUS ONCE
Refills: 0 | Status: COMPLETED | OUTPATIENT
Start: 2021-05-24 | End: 2021-05-24

## 2021-05-24 RX ORDER — METOPROLOL TARTRATE 50 MG
200 TABLET ORAL DAILY
Refills: 0 | Status: DISCONTINUED | OUTPATIENT
Start: 2021-05-24 | End: 2021-05-26

## 2021-05-24 RX ORDER — FINASTERIDE 5 MG/1
5 TABLET, FILM COATED ORAL DAILY
Refills: 0 | Status: DISCONTINUED | OUTPATIENT
Start: 2021-05-24 | End: 2021-05-26

## 2021-05-24 RX ORDER — OXYCODONE HYDROCHLORIDE 5 MG/1
1 TABLET ORAL
Qty: 0 | Refills: 0 | DISCHARGE

## 2021-05-24 RX ORDER — CEFTRIAXONE 500 MG/1
1000 INJECTION, POWDER, FOR SOLUTION INTRAMUSCULAR; INTRAVENOUS ONCE
Refills: 0 | Status: COMPLETED | OUTPATIENT
Start: 2021-05-24 | End: 2021-05-24

## 2021-05-24 RX ORDER — INSULIN LISPRO 100/ML
VIAL (ML) SUBCUTANEOUS AT BEDTIME
Refills: 0 | Status: DISCONTINUED | OUTPATIENT
Start: 2021-05-24 | End: 2021-05-25

## 2021-05-24 RX ORDER — INSULIN LISPRO 100/ML
VIAL (ML) SUBCUTANEOUS
Refills: 0 | Status: DISCONTINUED | OUTPATIENT
Start: 2021-05-24 | End: 2021-05-25

## 2021-05-24 RX ORDER — METOPROLOL TARTRATE 50 MG
1 TABLET ORAL
Qty: 0 | Refills: 0 | DISCHARGE

## 2021-05-24 RX ORDER — CEFTRIAXONE 500 MG/1
1000 INJECTION, POWDER, FOR SOLUTION INTRAMUSCULAR; INTRAVENOUS ONCE
Refills: 0 | Status: DISCONTINUED | OUTPATIENT
Start: 2021-05-24 | End: 2021-05-24

## 2021-05-24 RX ORDER — ASPIRIN/CALCIUM CARB/MAGNESIUM 324 MG
81 TABLET ORAL DAILY
Refills: 0 | Status: DISCONTINUED | OUTPATIENT
Start: 2021-05-24 | End: 2021-05-26

## 2021-05-24 RX ORDER — LISINOPRIL 2.5 MG/1
40 TABLET ORAL DAILY
Refills: 0 | Status: DISCONTINUED | OUTPATIENT
Start: 2021-05-24 | End: 2021-05-26

## 2021-05-24 RX ORDER — ASPIRIN/CALCIUM CARB/MAGNESIUM 324 MG
1 TABLET ORAL
Qty: 0 | Refills: 0 | DISCHARGE

## 2021-05-24 RX ORDER — DEXTROSE 50 % IN WATER 50 %
15 SYRINGE (ML) INTRAVENOUS ONCE
Refills: 0 | Status: DISCONTINUED | OUTPATIENT
Start: 2021-05-24 | End: 2021-05-26

## 2021-05-24 RX ORDER — AMLODIPINE BESYLATE 2.5 MG/1
5 TABLET ORAL DAILY
Refills: 0 | Status: DISCONTINUED | OUTPATIENT
Start: 2021-05-24 | End: 2021-05-26

## 2021-05-24 RX ORDER — DOXAZOSIN MESYLATE 4 MG
4 TABLET ORAL AT BEDTIME
Refills: 0 | Status: DISCONTINUED | OUTPATIENT
Start: 2021-05-24 | End: 2021-05-26

## 2021-05-24 RX ADMIN — SODIUM CHLORIDE 1000 MILLILITER(S): 9 INJECTION INTRAMUSCULAR; INTRAVENOUS; SUBCUTANEOUS at 18:12

## 2021-05-24 RX ADMIN — SODIUM CHLORIDE 1000 MILLILITER(S): 9 INJECTION INTRAMUSCULAR; INTRAVENOUS; SUBCUTANEOUS at 19:14

## 2021-05-24 RX ADMIN — Medication 30 MILLIGRAM(S): at 18:12

## 2021-05-24 RX ADMIN — CEFTRIAXONE 1000 MILLIGRAM(S): 500 INJECTION, POWDER, FOR SOLUTION INTRAMUSCULAR; INTRAVENOUS at 18:12

## 2021-05-24 NOTE — ED STATDOCS - OBJECTIVE STATEMENT
79 y/o male with a PMHx of CAD, HTN, HLD, DMII, kidney stones, presents to the ED c/o intermittent fever (Tmax 101.5F) today. Pt states on 05/20/2021 had stones removed from left kidney at [place] with Dr. Mic Case. Pt called doctor who told pt to come to closest ED for eval. Pt reports fever and discomfort to left abdomen. Notes generalized pain and weakness. No other complaints at this time. NKDA.

## 2021-05-24 NOTE — H&P ADULT - NSICDXFAMILYHX_GEN_ALL_CORE_FT
FAMILY HISTORY:  No pertinent family history in first degree relatives     FAMILY HISTORY:  Father  Still living? No  FH: bladder cancer, Age at diagnosis: Age Unknown

## 2021-05-24 NOTE — ED ADULT TRIAGE NOTE - CHIEF COMPLAINT QUOTE
pt presents to Ed with complaints of post op complication. pt reports having multiple kidney stones removed from left kidney with Md Mic Herzog at [place] on 5/20. pt reports this am he had a fever (tmax 101.5). pt called MD who told pt to come to ED for eval. pt received moderna COVID vaccine with second dose 04/2021.

## 2021-05-24 NOTE — H&P ADULT - NSHPPHYSICALEXAM_GEN_ALL_CORE
Vitals  T(F): 100.2 (05-24-21 @ 16:36), Max: 100.2 (05-24-21 @ 16:36)  HR: 85 (05-24-21 @ 16:36) (85 - 85)  BP: 131/80 (05-24-21 @ 16:36) (131/80 - 131/80)  RR: 17 (05-24-21 @ 16:36) (17 - 17)  SpO2: 97% (05-24-21 @ 16:36) (97% - 97%)    Physical Exam   Gen: NAD, comfortable  HENT: atraumatic head and ears, no gross abnormalities of ears, mucous membranes moist, no oral lesions, neck supple without masses/goiter/lymphadenopathy  CV: RRR, nl s1/s2, no M/R/G  Pulm: nl respiratory effort, CTAB, no wheezes/crackles/rhonchi  Back: no scoliosis, lordosis, or kyphosis, no tenderness, surgical site incision healing well w/o discharge  Abd: normoactive bowel sounds in all 4 quadrants, soft, nontender, nondistended, no rebound, no guarding, no masses  Extremities: no pedal edema, pedal pulses palpable   Skin: nl warm and dry, no wounds   Neuro: A&Ox3, answering questions appropriately, EOMI, face symmetric, no dysarthria, 5/5 strength in upper and lower extremities bilaterally  Psych: no depression, no SI, no HI

## 2021-05-24 NOTE — H&P ADULT - CONVERSATION DETAILS
Pt does not want intubation or resuscitation for the purpose of prolonging his life. If quality of life-improving surgeries are recommended, he would temporarily rescind DNR/DNI for the purposes of surgery.

## 2021-05-24 NOTE — H&P ADULT - ATTENDING COMMENTS
80 year old male patient with narrative as previously stated found to febrile in the post-operative state also with super-imposed UTI      -Admit to [facility]      # UTI  -pt with recent urological procedure  -on Rocephin  -f/u blood and urine cx    # Fever  -can be related to recent urological procedure  -monitor temps  -currently on Rocephin for above infection    # CAD  -on statin, BB, ACE    # BPH  -on finasteride    # HTN  -on norvasc    # Advanced Directives  - DNR/DNI  -MOLST form filled out

## 2021-05-24 NOTE — H&P ADULT - HISTORY OF PRESENT ILLNESS
80M w/ CAD, HTN, HLD, T2DM, BPH, kidney stones w/ recent surgical removal on 5/20 presenting with fever x 1 day. Pt had kidney stones removed from left kidney at [place] w/ Dr. Mic Case. Reports left flank pain and malaise. Reports urinary frequency. Denies dysuria or hematuria. Denies cough, localized abdominal pain, nausea, vomiting, diarrhea, drainage/erythema from surgical site.  80M w/ CAD, HTN, HLD, T2DM, BPH, kidney stones w/ recent surgical removal on 5/20 presenting with fever x 1 day. Pt had kidney stones removed from left kidney at [place] w/ Dr. Mic Case. Reports left flank pain and malaise. Reports urinary frequency, dysuria or gross hematuria. Denies cough, localized abdominal pain, nausea, vomiting, diarrhea, drainage/erythema from surgical site.

## 2021-05-24 NOTE — H&P ADULT - ASSESSMENT
80M w/ CAD, HTN, HLD, T2DM, BPH, kidney stones w/ recent surgical removal on 5/20 presenting with fever x 1 day being admitted for urinary tract infection s/p procedure.     #UTI  - admit to med/surg  - CT A&P: nonspecific stranding of b/l perirenal fat w/ perirenal fluid, L> R, multiple calculi, +indwelling left ureteral stent w/o calculi in stent  - UA: +WBC, +RBC, +occasional bacteria  - lactate 1.4  -   - f/u urine and blood cultures    #Anemia  - mild  - likely secondary to procedure  - no gross ongoing bleeding  - continue to trend    #T2DM  - hold home oral antihyperglycemics  - ISS, reevaluate in 24 hours     #CAD/HTN/HLD/BPH  - chronic, stable  - continue home meds:     #DVT PPx  - IMPROVE Score 1   - heparin     #GOC   80M w/ CAD, HTN, HLD, T2DM, BPH, kidney stones w/ recent surgical removal on 5/20 presenting with fever x 1 day being admitted for urinary tract infection s/p procedure.     #UTI  - admit to med/surg  - CT A&P: nonspecific stranding of b/l perirenal fat w/ perirenal fluid, L> R, multiple calculi, +indwelling left ureteral stent w/o calculi in stent  - UA: +WBC, +RBC, +occasional bacteria  - lactate 1.4  - continue ceftriaxone   - f/u urine and blood cultures    #Anemia  - mild  - likely secondary to procedure  - continue to trend    #T2DM  - hold home oral antihyperglycemics  - ISS, reevaluate in 24 hours     #CAD/HTN/HLD/BPH  - chronic, stable  - continue home meds: aspirin, atorvastatin, amlodipine, ACEI, doxazosin, finasteride     #DVT PPx  - IMPROVE Score 1   - SCDs    #GOC  - DNR/DNI

## 2021-05-24 NOTE — H&P ADULT - NSICDXPASTSURGICALHX_GEN_ALL_CORE_FT
PAST SURGICAL HISTORY:  H/O lithotripsy 2013    S/P cataract surgery bilaterally    S/P lumbar spinal fusion     S/P T&A (status post tonsillectomy and adenoidectomy)     Stented coronary artery 2/28/08 OM2 x 1; 3/13/2008 x 3 stents

## 2021-05-24 NOTE — H&P ADULT - NSHPSOCIALHISTORY_GEN_ALL_CORE
Lives with wife and grandson. usually independent in ADLs and IADLs. Ambulates with cane. Denies smoking, drinking, recreational drug use.

## 2021-05-24 NOTE — ED STATDOCS - NS_ ATTENDINGSCRIBEDETAILS _ED_A_ED_FT
Lorena Brooks MD: The history, relevant review of systems, past medical and surgical history, medical decision making, and physical examination was documented by the scribe in my presence and I attest to the accuracy of the documentation.

## 2021-05-24 NOTE — ED ADULT NURSE NOTE - CHIEF COMPLAINT QUOTE
pt presents to Ed with complaints of post op complication. pt reports having multiple kidney stones removed from left kidney with Md Mic Herzog at [place] on 5/20. pt reports this am he had a fever (tmax 101.5). pt called MD who told pt to come to ED for eval. pt received moderna COVID vaccine with second dose 04/2021.

## 2021-05-24 NOTE — ED STATDOCS - CLINICAL SUMMARY MEDICAL DECISION MAKING FREE TEXT BOX
79 y/o male s/p kidney stone removal on left well healing incision, with fever generalized malaise, r/o infection. Labs, empiric antibiotics.

## 2021-05-24 NOTE — H&P ADULT - NSHPREVIEWOFSYSTEMS_GEN_ALL_CORE
Review of Symptoms  Gen: +fevers, +malaise. No chills, sweats, weight loss  Visual: no recent changes in vision, no blurriness, no seeing spots  Cardiovascular: no chest pain, no palpitations, no orthopnea, no leg swelling  Respiratory: no shortness of breath, no exertional dyspnea, no cough, no rhinorrhea, no nasal congestion  GI: no difficulty swallowing, no nausea, no vomiting, no abdominal pain, no diarrhea, no constipation, no melena  : +increased frequency, no dysuria, no hematuria, no malodorous urine  Derm: +clean surgical site, no other wounds, no rashes  Heme: no easy bleeding or bruising  MSK: no joint pain, no joint swelling or redness, no extremity pain   Neuro: no headache, no numbness, no focal weakness, no memory loss  Psych: no depression, no anxiety, no SI

## 2021-05-24 NOTE — ED STATDOCS - PROGRESS NOTE DETAILS
79 y/o M with PMH of CAD, HTN, HLD, DM, kidney stones s/p extraction at [place] on 5/20/21 presents with fever, fatigue, lightheadedness, urinary frequency today. States he called his urologist Dr. Mic Case, who advised patient go to closest ED. Also reports pain in left abdomen. Denies nausea, vomiting, dysuria. PE: Well appearing. Cardiac: s1s2, RRR. Lungs: CTAB. Abdomen: NBS x4, soft, nontender. Dr. Mic Case office #: [phone]. - Mohamud Basilio PA-C 81 y/o M with PMH of CAD, HTN, HLD, DM, kidney stones s/p extraction at [place] on 5/20/21 presents with fever, fatigue, lightheadedness, urinary frequency today. States he called his urologist Dr. Mic Case, who advised patient go to closest ED. Also reports pain in left abdomen. Denies nausea, vomiting, dysuria. Pt states 12 stones were removed. PE: Well appearing. Cardiac: s1s2, RRR. Lungs: CTAB. Abdomen: NBS x4, soft, nontender. Surgical site over left flank, well healing 2cm incisional site. Wound appears clean, dry, intact. No drainage or dehiscence. A/P: Post-operative fever. plan for labs, CTAP, antibiotics, IVF, analgesia, d/w Dr. Case. - Mohamud Basilio PA-C Ct with stranding with stones in the b/l kidneys. Spoke with pt, pt was previously on abx. Will admit for IV abx. -Amor Baez PA-C

## 2021-05-24 NOTE — H&P ADULT - NSICDXPASTMEDICALHX_GEN_ALL_CORE_FT
PAST MEDICAL HISTORY:  Aortic stenosis, mild 2008    BPH (benign prostatic hypertrophy)     CAD (coronary artery disease)     Hyperlipidemia     Hypertension     Renal calculus     Renal cyst     Spinal stenosis of lumbar region s/p ZHOU x 2    Type 2 diabetes mellitus

## 2021-05-25 LAB
A1C WITH ESTIMATED AVERAGE GLUCOSE RESULT: 7.9 % — HIGH (ref 4–5.6)
ANION GAP SERPL CALC-SCNC: 5 MMOL/L — SIGNIFICANT CHANGE UP (ref 5–17)
BASOPHILS # BLD AUTO: 0.03 K/UL — SIGNIFICANT CHANGE UP (ref 0–0.2)
BASOPHILS NFR BLD AUTO: 0.5 % — SIGNIFICANT CHANGE UP (ref 0–2)
BUN SERPL-MCNC: 24 MG/DL — HIGH (ref 7–23)
CALCIUM SERPL-MCNC: 8.6 MG/DL — SIGNIFICANT CHANGE UP (ref 8.5–10.1)
CHLORIDE SERPL-SCNC: 102 MMOL/L — SIGNIFICANT CHANGE UP (ref 96–108)
CO2 SERPL-SCNC: 30 MMOL/L — SIGNIFICANT CHANGE UP (ref 22–31)
COVID-19 SPIKE DOMAIN AB INTERP: POSITIVE
COVID-19 SPIKE DOMAIN ANTIBODY RESULT: >250 U/ML — HIGH
CREAT SERPL-MCNC: 1.21 MG/DL — SIGNIFICANT CHANGE UP (ref 0.5–1.3)
EOSINOPHIL # BLD AUTO: 0.04 K/UL — SIGNIFICANT CHANGE UP (ref 0–0.5)
EOSINOPHIL NFR BLD AUTO: 0.6 % — SIGNIFICANT CHANGE UP (ref 0–6)
ESTIMATED AVERAGE GLUCOSE: 180 MG/DL — HIGH (ref 68–114)
GLUCOSE SERPL-MCNC: 200 MG/DL — HIGH (ref 70–99)
HCT VFR BLD CALC: 35.6 % — LOW (ref 39–50)
HGB BLD-MCNC: 11.7 G/DL — LOW (ref 13–17)
IMM GRANULOCYTES NFR BLD AUTO: 0.8 % — SIGNIFICANT CHANGE UP (ref 0–1.5)
LYMPHOCYTES # BLD AUTO: 0.77 K/UL — LOW (ref 1–3.3)
LYMPHOCYTES # BLD AUTO: 11.6 % — LOW (ref 13–44)
MCHC RBC-ENTMCNC: 29.5 PG — SIGNIFICANT CHANGE UP (ref 27–34)
MCHC RBC-ENTMCNC: 32.9 GM/DL — SIGNIFICANT CHANGE UP (ref 32–36)
MCV RBC AUTO: 89.7 FL — SIGNIFICANT CHANGE UP (ref 80–100)
MONOCYTES # BLD AUTO: 0.72 K/UL — SIGNIFICANT CHANGE UP (ref 0–0.9)
MONOCYTES NFR BLD AUTO: 10.8 % — SIGNIFICANT CHANGE UP (ref 2–14)
NEUTROPHILS # BLD AUTO: 5.05 K/UL — SIGNIFICANT CHANGE UP (ref 1.8–7.4)
NEUTROPHILS NFR BLD AUTO: 75.7 % — SIGNIFICANT CHANGE UP (ref 43–77)
PLATELET # BLD AUTO: 148 K/UL — LOW (ref 150–400)
POTASSIUM SERPL-MCNC: 3.9 MMOL/L — SIGNIFICANT CHANGE UP (ref 3.5–5.3)
POTASSIUM SERPL-SCNC: 3.9 MMOL/L — SIGNIFICANT CHANGE UP (ref 3.5–5.3)
RBC # BLD: 3.97 M/UL — LOW (ref 4.2–5.8)
RBC # FLD: 14.6 % — HIGH (ref 10.3–14.5)
SARS-COV-2 IGG+IGM SERPL QL IA: >250 U/ML — HIGH
SARS-COV-2 IGG+IGM SERPL QL IA: POSITIVE
SODIUM SERPL-SCNC: 137 MMOL/L — SIGNIFICANT CHANGE UP (ref 135–145)
WBC # BLD: 6.66 K/UL — SIGNIFICANT CHANGE UP (ref 3.8–10.5)
WBC # FLD AUTO: 6.66 K/UL — SIGNIFICANT CHANGE UP (ref 3.8–10.5)

## 2021-05-25 PROCEDURE — 99232 SBSQ HOSP IP/OBS MODERATE 35: CPT | Mod: GC

## 2021-05-25 RX ORDER — CEFTRIAXONE 500 MG/1
1000 INJECTION, POWDER, FOR SOLUTION INTRAMUSCULAR; INTRAVENOUS EVERY 24 HOURS
Refills: 0 | Status: DISCONTINUED | OUTPATIENT
Start: 2021-05-25 | End: 2021-05-26

## 2021-05-25 RX ORDER — INSULIN LISPRO 100/ML
VIAL (ML) SUBCUTANEOUS AT BEDTIME
Refills: 0 | Status: DISCONTINUED | OUTPATIENT
Start: 2021-05-25 | End: 2021-05-26

## 2021-05-25 RX ORDER — POLYETHYLENE GLYCOL 3350 17 G/17G
17 POWDER, FOR SOLUTION ORAL DAILY
Refills: 0 | Status: DISCONTINUED | OUTPATIENT
Start: 2021-05-25 | End: 2021-05-26

## 2021-05-25 RX ORDER — OXYCODONE HYDROCHLORIDE 5 MG/1
5 TABLET ORAL EVERY 8 HOURS
Refills: 0 | Status: DISCONTINUED | OUTPATIENT
Start: 2021-05-25 | End: 2021-05-26

## 2021-05-25 RX ORDER — LANOLIN ALCOHOL/MO/W.PET/CERES
3 CREAM (GRAM) TOPICAL ONCE
Refills: 0 | Status: COMPLETED | OUTPATIENT
Start: 2021-05-25 | End: 2021-05-25

## 2021-05-25 RX ORDER — INSULIN LISPRO 100/ML
VIAL (ML) SUBCUTANEOUS
Refills: 0 | Status: DISCONTINUED | OUTPATIENT
Start: 2021-05-25 | End: 2021-05-26

## 2021-05-25 RX ORDER — CEFTRIAXONE 500 MG/1
1000 INJECTION, POWDER, FOR SOLUTION INTRAMUSCULAR; INTRAVENOUS EVERY 24 HOURS
Refills: 0 | Status: DISCONTINUED | OUTPATIENT
Start: 2021-05-25 | End: 2021-05-25

## 2021-05-25 RX ORDER — SENNA PLUS 8.6 MG/1
2 TABLET ORAL AT BEDTIME
Refills: 0 | Status: DISCONTINUED | OUTPATIENT
Start: 2021-05-25 | End: 2021-05-26

## 2021-05-25 RX ADMIN — Medication 1: at 08:59

## 2021-05-25 RX ADMIN — Medication 650 MILLIGRAM(S): at 00:11

## 2021-05-25 RX ADMIN — SENNA PLUS 2 TABLET(S): 8.6 TABLET ORAL at 21:31

## 2021-05-25 RX ADMIN — AMLODIPINE BESYLATE 5 MILLIGRAM(S): 2.5 TABLET ORAL at 10:07

## 2021-05-25 RX ADMIN — Medication 81 MILLIGRAM(S): at 10:07

## 2021-05-25 RX ADMIN — Medication 4 MILLIGRAM(S): at 21:31

## 2021-05-25 RX ADMIN — Medication 650 MILLIGRAM(S): at 05:55

## 2021-05-25 RX ADMIN — CEFTRIAXONE 1000 MILLIGRAM(S): 500 INJECTION, POWDER, FOR SOLUTION INTRAMUSCULAR; INTRAVENOUS at 17:39

## 2021-05-25 RX ADMIN — Medication 2: at 21:31

## 2021-05-25 RX ADMIN — Medication 200 MILLIGRAM(S): at 10:08

## 2021-05-25 RX ADMIN — Medication 650 MILLIGRAM(S): at 20:33

## 2021-05-25 RX ADMIN — FINASTERIDE 5 MILLIGRAM(S): 5 TABLET, FILM COATED ORAL at 10:07

## 2021-05-25 RX ADMIN — Medication 2: at 12:55

## 2021-05-25 RX ADMIN — Medication 2: at 17:36

## 2021-05-25 RX ADMIN — ATORVASTATIN CALCIUM 40 MILLIGRAM(S): 80 TABLET, FILM COATED ORAL at 21:31

## 2021-05-25 RX ADMIN — Medication 12.5 MILLIGRAM(S): at 10:07

## 2021-05-25 RX ADMIN — MORPHINE SULFATE 4 MILLIGRAM(S): 50 CAPSULE, EXTENDED RELEASE ORAL at 00:10

## 2021-05-25 RX ADMIN — POLYETHYLENE GLYCOL 3350 17 GRAM(S): 17 POWDER, FOR SOLUTION ORAL at 12:56

## 2021-05-25 RX ADMIN — LISINOPRIL 40 MILLIGRAM(S): 2.5 TABLET ORAL at 10:08

## 2021-05-25 RX ADMIN — Medication 650 MILLIGRAM(S): at 21:25

## 2021-05-25 NOTE — ED ADULT NURSE REASSESSMENT NOTE - NS ED NURSE REASSESS COMMENT FT1
report given to 5 gabriel Laws called for additional pain medications.  Orders to be written at this time.

## 2021-05-25 NOTE — PROGRESS NOTE ADULT - ASSESSMENT
80M w/ CAD, HTN, HLD, T2DM, BPH, kidney stones w/ recent surgical removal on 5/20 presenting with fever x 1 day being admitted for urinary tract infection s/p procedure.     #UTI  - CT A&P: nonspecific stranding of b/l perirenal fat w/ perirenal fluid, L> R, multiple calculi, +indwelling left ureteral stent w/o calculi in stent  - UA: +WBC, +RBC, +occasional bacteria  - lactate 1.4  - continue ceftriaxone   - f/u urine and blood cultures    #Anemia  - mild  - likely secondary to procedure  - continue to trend    #T2DM  - hold home oral antihyperglycemics  - ISS, reevaluate in 24 hours     #CAD/HTN/HLD/BPH  - chronic, stable  - continue home meds: aspirin, atorvastatin, amlodipine, ACEI, doxazosin, finasteride     #DVT PPx  - IMPROVE Score 1   - SCDs    #GOC  - DNR/DNI    *Case discussed with Dr. Palma 80M w/ CAD, HTN, HLD, T2DM, BPH, kidney stones w/ recent surgical removal on 5/20 presenting with fever x 1 day being admitted for urinary tract infection s/p procedure.     #UTI  - CT A&P: nonspecific stranding of b/l perirenal fat w/ perirenal fluid, L> R, multiple calculi, +indwelling left ureteral stent w/o calculi in stent  - UA: +WBC, +RBC, +occasional bacteria  - lactate 1.4  - continue ceftriaxone   - f/u urine and blood cultures    #Anemia  - mild  - likely secondary to procedure  - continue to trend    #T2DM  - hold home oral antihyperglycemics  - ISS, reevaluate in 24 hours     #CAD/HTN/HLD/BPH  - chronic, stable  - continue home meds: aspirin, atorvastatin, amlodipine, ACEI, doxazosin, finasteride     #DVT PPx  - IMPROVE Score 1   - SCDs    #GOC  - DNR/DNI    #Dispo  - Patient's PCP Dr. Collier office contacted today to inform on patient's admission.    *Case discussed with Dr. Palma 80M w/ CAD, HTN, HLD, T2DM, BPH, kidney stones w/ recent surgical removal on 5/20 presenting with fever x 1 day being admitted for urinary tract infection s/p procedure.     #UTI possibly 2/2 to recent surgical removal of kidney stones  - CT A&P: nonspecific stranding of b/l perirenal fat w/ perirenal fluid, L> R, multiple calculi, +indwelling left ureteral stent w/o calculi in stent  - UA: +WBC, +RBC, +occasional bacteria  - lactate 1.4  - continue ceftriaxone   - f/u urine and blood cultures    #Anemia  - mild  - likely secondary to procedure  - continue to trend    #T2DM  - hold home oral antihyperglycemics  - ISS, reevaluate in 24 hours     #CAD/HTN/HLD/BPH  - chronic, stable  - continue home meds: aspirin, atorvastatin, amlodipine, ACEI, doxazosin, finasteride     #DVT PPx  - IMPROVE Score 1   - SCDs    #GOC  - DNR/DNI    #Dispo  - Patient's PCP Dr. Collier office contacted today to inform on patient's admission.    *Case discussed with Dr. Palma

## 2021-05-25 NOTE — PROGRESS NOTE ADULT - ATTENDING COMMENTS
Patient is a 80y old  Male who presents with a chief complaint of fever (25 May 2021 15:20)      MEDICATIONS  (STANDING):  amLODIPine   Tablet 5 milliGRAM(s) Oral daily  aspirin enteric coated 81 milliGRAM(s) Oral daily  atorvastatin 40 milliGRAM(s) Oral at bedtime  cefTRIAXone Injectable. 1000 milliGRAM(s) IV Push every 24 hours  dextrose 40% Gel 15 Gram(s) Oral once  dextrose 5%. 1000 milliLiter(s) (50 mL/Hr) IV Continuous <Continuous>  dextrose 5%. 1000 milliLiter(s) (100 mL/Hr) IV Continuous <Continuous>  dextrose 50% Injectable 25 Gram(s) IV Push once  dextrose 50% Injectable 12.5 Gram(s) IV Push once  dextrose 50% Injectable 25 Gram(s) IV Push once  doxazosin 4 milliGRAM(s) Oral at bedtime  finasteride 5 milliGRAM(s) Oral daily  glucagon  Injectable 1 milliGRAM(s) IntraMuscular once  hydrochlorothiazide 12.5 milliGRAM(s) Oral daily  insulin lispro (ADMELOG) corrective regimen sliding scale   SubCutaneous three times a day before meals  insulin lispro (ADMELOG) corrective regimen sliding scale   SubCutaneous at bedtime  lisinopril 40 milliGRAM(s) Oral daily  metoprolol succinate  milliGRAM(s) Oral daily  polyethylene glycol 3350 17 Gram(s) Oral daily  senna 2 Tablet(s) Oral at bedtime    MEDICATIONS  (PRN):  acetaminophen   Tablet .. 650 milliGRAM(s) Oral every 6 hours PRN Mild Pain (1 - 3)  oxyCODONE    IR 5 milliGRAM(s) Oral every 8 hours PRN Severe Pain (7 - 10)  traMADol 50 milliGRAM(s) Oral every 8 hours PRN Moderate Pain (4 - 6)      CAPILLARY BLOOD GLUCOSE      POCT Blood Glucose.: 215 mg/dL (25 May 2021 17:09)  POCT Blood Glucose.: 209 mg/dL (25 May 2021 12:29)  POCT Blood Glucose.: 198 mg/dL (25 May 2021 08:13)  POCT Blood Glucose.: 217 mg/dL (25 May 2021 00:12)      PHYSICAL EXAM:    GENERAL: NAD, lying in bed comfortably, alert and oriented  HEAD:  Atraumatic, Normocephalic, NECK: Supple, No JVD  EYES: EOMI, PERRL, conjunctiva and sclera clear, ENT: Moist mucous membranes  CHEST/LUNG: Clear to auscultation bilaterally; No rales, rhonchi, wheezing, or rubs. Unlabored respirations  HEART: Regular rate and rhythm; No murmurs, rubs, or gallops  ABDOMEN: Bowel sounds present; Soft, Nontender, Nondistended. No hepatomegally  EXTREMITIES:  2+ Peripheral Pulses, brisk capillary refill. No clubbing, cyanosis, or edema  NERVOUS SYSTEM:  No focal deficits   MSK: FROM all 4 extremities, full and equal strength, SKIN: No rashes or lesions    LABS: Reviewed with resident as documented above    RADIOLOGY & ADDITIONAL TESTS:    Imaging Personally Reviewed:    Consultant(s) Notes Reviewed:      Care Discussed with Consultants/Other Providers:      Patient was seen on rounds, interviewed and examined with Dr. Stern. Medical Record reviewed. History, review of systems, physical findings and lab results as documented confirmed, except as modified by me. Agree with management plan as described except as modified below.    continue antibiotics  Discharge planning

## 2021-05-25 NOTE — ED ADULT NURSE REASSESSMENT NOTE - NS ED NURSE REASSESS COMMENT FT1
Patient received at 2330 c/o chills and pain in his back.  Morphine given for pain.  VS as charted.  Patient c/o chills Tylenol also given.  Oral temperature 98.5 at this time.  Patient FSBS 217 at this time no coverage given at this time.  Patient resting awaiting room assignment.  ID band placed on right wrist and IV is flushed and patent with no signs of redness, swelling or infiltration noted.

## 2021-05-25 NOTE — PATIENT PROFILE ADULT - NSPROMEDSBROUGHTTOHOSP_GEN_A_NUR
Has osteomyelitis/discitis, seen by ortho, ID  s/p removal of hardware T6, T4-T10 PSF, exploration of Hardware, junctional kyphosis  blood cultures, started on vanco  pain control w/Fentanyl, Dilaudid, Oxycontin, oxycodone no

## 2021-05-25 NOTE — PROGRESS NOTE ADULT - SUBJECTIVE AND OBJECTIVE BOX
HPI:  80M w/ CAD, HTN, HLD, T2DM, BPH, kidney stones w/ recent surgical removal on 5/20 presenting with fever x 1 day. Pt had kidney stones removed from left kidney at [facility] w/ Dr. Mic Case. Reports left flank pain and malaise. Reports urinary frequency, dysuria or gross hematuria. Denies cough, localized abdominal pain, nausea, vomiting, diarrhea, drainage/erythema from surgical site.  (24 May 2021 22:23)    SUBJECTIVE:   Patient seen and examined at bedside.  Patient mentions the last time he saw blood in his urine was today in the morning, but starting today around 1pm he started having increased urinary frequency and urgency again.  He also mentions the second time he tried     REVIEW OF SYSTEMS:  CONSTITUTIONAL: No weakness, fevers or chills  EYES/ENT: No visual changes;  No vertigo or throat pain   NECK: No pain or stiffness  RESPIRATORY: No cough, wheezing, hemoptysis; No shortness of breath  CARDIOVASCULAR: No chest pain or palpitations  GASTROINTESTINAL: No abdominal or epigastric pain. No nausea, vomiting, or hematemesis; No diarrhea or constipation. No melena or hematochezia.  GENITOURINARY: No dysuria, frequency or hematuria  NEUROLOGICAL: No numbness or weakness  SKIN: No itching, burning, rashes, or lesions   All other review of systems is negative unless indicated above    Vital Signs Last 24 Hrs  T(C): 37.3 (25 May 2021 08:18), Max: 38.1 (25 May 2021 05:52)  T(F): 99.2 (25 May 2021 08:18), Max: 100.5 (25 May 2021 05:52)  HR: 81 (25 May 2021 10:14) (81 - 92)  BP: 148/68 (25 May 2021 10:14) (123/66 - 154/74)  BP(mean): 92 (24 May 2021 16:36) (92 - 92)  RR: 18 (25 May 2021 08:18) (17 - 18)  SpO2: 95% (25 May 2021 08:18) (95% - 99%)    I&O's Summary      CAPILLARY BLOOD GLUCOSE      POCT Blood Glucose.: 209 mg/dL (25 May 2021 12:29)  POCT Blood Glucose.: 198 mg/dL (25 May 2021 08:13)  POCT Blood Glucose.: 217 mg/dL (25 May 2021 00:12)      PHYSICAL EXAM:    Constitutional: NAD, awake and alert, well-developed  HEENT: PERR, EOMI, Normal Hearing, MMM  Neck: Soft and supple, No LAD, No JVD  Respiratory: Breath sounds are clear bilaterally, No wheezing, rales or rhonchi  Cardiovascular: S1 and S2, regular rate and rhythm, no Murmurs, gallops or rubs  Gastrointestinal: Bowel Sounds present, soft, nontender, nondistended, no guarding, no rebound  Extremities: No peripheral edema  Vascular: 2+ peripheral pulses  Neurological: A/O x 3, no focal deficits  Musculoskeletal: 5/5 strength b/l upper and lower extremities  Skin: No rashes    MEDICATIONS:  MEDICATIONS  (STANDING):  amLODIPine   Tablet 5 milliGRAM(s) Oral daily  aspirin enteric coated 81 milliGRAM(s) Oral daily  atorvastatin 40 milliGRAM(s) Oral at bedtime  cefTRIAXone Injectable. 1000 milliGRAM(s) IV Push every 24 hours  dextrose 40% Gel 15 Gram(s) Oral once  dextrose 5%. 1000 milliLiter(s) (50 mL/Hr) IV Continuous <Continuous>  dextrose 5%. 1000 milliLiter(s) (100 mL/Hr) IV Continuous <Continuous>  dextrose 50% Injectable 25 Gram(s) IV Push once  dextrose 50% Injectable 12.5 Gram(s) IV Push once  dextrose 50% Injectable 25 Gram(s) IV Push once  doxazosin 4 milliGRAM(s) Oral at bedtime  finasteride 5 milliGRAM(s) Oral daily  glucagon  Injectable 1 milliGRAM(s) IntraMuscular once  hydrochlorothiazide 12.5 milliGRAM(s) Oral daily  insulin lispro (ADMELOG) corrective regimen sliding scale   SubCutaneous three times a day before meals  insulin lispro (ADMELOG) corrective regimen sliding scale   SubCutaneous at bedtime  lisinopril 40 milliGRAM(s) Oral daily  metoprolol succinate  milliGRAM(s) Oral daily  polyethylene glycol 3350 17 Gram(s) Oral daily  senna 2 Tablet(s) Oral at bedtime      LABS: All Labs Reviewed:                        11.7   6.66  )-----------( 148      ( 25 May 2021 05:51 )             35.6     05-25    137  |  102  |  24<H>  ----------------------------<  200<H>  3.9   |  30  |  1.21    Ca    8.6      25 May 2021 05:51    TPro  7.8  /  Alb  3.1<L>  /  TBili  0.7  /  DBili  x   /  AST  11<L>  /  ALT  15  /  AlkPhos  73  05-24          Blood Culture:     RADIOLOGY/EKG:    DVT PPX:    ADVANCED DIRECTIVE:    DISPOSITION: HPI:  80M w/ CAD, HTN, HLD, T2DM, BPH, kidney stones w/ recent surgical removal on 5/20 presenting with fever x 1 day. Pt had kidney stones removed from left kidney at [place] w/ Dr. Mic Case. Reports left flank pain and malaise. Reports urinary frequency, dysuria or gross hematuria. Denies cough, localized abdominal pain, nausea, vomiting, diarrhea, drainage/erythema from surgical site.  (24 May 2021 22:23)    SUBJECTIVE:   Patient seen and examined at bedside.  Patient mentions the last time he saw blood in his urine was today in the morning, but starting today around 1pm he started having increased urinary frequency and urgency again.  He also mentions the second time he tried to pee, his urinary stream "was splashing everywhere and not in a straight line."    REVIEW OF SYSTEMS:  CONSTITUTIONAL: No weakness, +fevers and chills  EYES/ENT: No visual changes;  No vertigo or throat pain   NECK: No pain or stiffness  RESPIRATORY: No cough, wheezing, hemoptysis; No shortness of breath  CARDIOVASCULAR: No chest pain or palpitations  GASTROINTESTINAL: No abdominal or epigastric pain. No nausea, vomiting, or hematemesis; No diarrhea or constipation. No melena or hematochezia.  GENITOURINARY: No dysuria, +frequency,  +hematuria, +urgency  NEUROLOGICAL: No numbness or weakness  SKIN: No itching, burning, rashes, or lesions   All other review of systems is negative unless indicated above    Vital Signs Last 24 Hrs  T(C): 37.3 (25 May 2021 08:18), Max: 38.1 (25 May 2021 05:52)  T(F): 99.2 (25 May 2021 08:18), Max: 100.5 (25 May 2021 05:52)  HR: 81 (25 May 2021 10:14) (81 - 92)  BP: 148/68 (25 May 2021 10:14) (123/66 - 154/74)  BP(mean): 92 (24 May 2021 16:36) (92 - 92)  RR: 18 (25 May 2021 08:18) (17 - 18)  SpO2: 95% (25 May 2021 08:18) (95% - 99%)    CAPILLARY BLOOD GLUCOSE  POCT Blood Glucose.: 209 mg/dL (25 May 2021 12:29)  POCT Blood Glucose.: 198 mg/dL (25 May 2021 08:13)  POCT Blood Glucose.: 217 mg/dL (25 May 2021 00:12)      PHYSICAL EXAM:  Constitutional: NAD, awake and alert, well-developed  HEENT: PERR, EOMI, Normal Hearing, MMM  Neck: Soft and supple, No LAD, No JVD  Respiratory: Breath sounds are clear bilaterally, No wheezing, rales or rhonchi  Cardiovascular: S1 and S2, regular rate and rhythm, no Murmurs, gallops or rubs  Gastrointestinal: Bowel Sounds present, soft, nontender, nondistended, no guarding, no rebound  Extremities: No peripheral edema  Vascular: 2+ peripheral pulses  Neurological: A/O x 3, no focal deficits  Musculoskeletal: 5/5 strength b/l upper and lower extremities  Skin: No rashes    MEDICATIONS:  MEDICATIONS  (STANDING):  amLODIPine   Tablet 5 milliGRAM(s) Oral daily  aspirin enteric coated 81 milliGRAM(s) Oral daily  atorvastatin 40 milliGRAM(s) Oral at bedtime  cefTRIAXone Injectable. 1000 milliGRAM(s) IV Push every 24 hours  dextrose 40% Gel 15 Gram(s) Oral once  dextrose 5%. 1000 milliLiter(s) (50 mL/Hr) IV Continuous <Continuous>  dextrose 5%. 1000 milliLiter(s) (100 mL/Hr) IV Continuous <Continuous>  dextrose 50% Injectable 25 Gram(s) IV Push once  dextrose 50% Injectable 12.5 Gram(s) IV Push once  dextrose 50% Injectable 25 Gram(s) IV Push once  doxazosin 4 milliGRAM(s) Oral at bedtime  finasteride 5 milliGRAM(s) Oral daily  glucagon  Injectable 1 milliGRAM(s) IntraMuscular once  hydrochlorothiazide 12.5 milliGRAM(s) Oral daily  insulin lispro (ADMELOG) corrective regimen sliding scale   SubCutaneous three times a day before meals  insulin lispro (ADMELOG) corrective regimen sliding scale   SubCutaneous at bedtime  lisinopril 40 milliGRAM(s) Oral daily  metoprolol succinate  milliGRAM(s) Oral daily  polyethylene glycol 3350 17 Gram(s) Oral daily  senna 2 Tablet(s) Oral at bedtime      LABS: All Labs Reviewed:             11.7   6.66  )-----------( 148      ( 25 May 2021 05:51 )             35.6     05-25    137  |  102  |  24<H>  ----------------------------<  200<H>  3.9   |  30  |  1.21    Ca    8.6      25 May 2021 05:51    TPro  7.8  /  Alb  3.1<L>  /  TBili  0.7  /  DBili  x   /  AST  11<L>  /  ALT  15  /  AlkPhos  73  05-24      RADIOLOGY/EKG:    < from: CT Abdomen and Pelvis w/ IV Cont (05.24.21 @ 20:04) >  KIDNEYS/URETERS: Nonspecific stranding of the bilateral perirenal fat identified with perirenal fluid, left greater than right. There are multiple left renal calculi noted within the mid and lower pole aspects measuring up to 1.2 cm. A 4 mm right renal calculus is noted within the lower pole region. Cystic foci noted within the left kidney measuring up to 2.1 cm. Note is made of an indwelling leftureteral stent. No calculi are noted along the course of the left ureteral stent.    BLADDER: No bladder calculi identified. Not distended, precluding assessment for bladder wall thickening.  REPRODUCTIVE ORGANS: The prostate is enlarged.    BOWEL: Fecal material scattered throughout the colon. No evidence for mechanical bowel obstruction. Colonic diverticulosis. No evidence for colonic wall thickening. No evidence for acute appendicitis.  PERITONEUM: No free intraperitoneal air or fluid.  VESSELS: Calcified left renal artery aneurysms identified.  RETROPERITONEUM/LYMPH NODES: No lymphadenopathy.  ABDOMINAL WALL: Fat containing right inguinal hernia.  BONES: Degenerative changes. Status post lumbar spine fusion surgery L2-L5.    IMPRESSION:  Nonspecific stranding of the bilateral perirenal fat identified with perirenal fluid, left greater than right. There are multiple left renal calculi noted within the mid and lower pole aspects measuring up to 1.2 cm. A 4 mm right renal calculus is noted within the lower pole region.  Note is made of an indwelling left ureteral stent. No calculi are noted along the course of the left ureteral stent.      < end of copied text >

## 2021-05-25 NOTE — PATIENT PROFILE ADULT - VISION (WITH CORRECTIVE LENSES IF THE PATIENT USUALLY WEARS THEM):
Attending Attestation (For Attendings USE Only)...
Normal vision: sees adequately in most situations; can see medication labels, newsprint

## 2021-05-26 VITALS
DIASTOLIC BLOOD PRESSURE: 80 MMHG | HEART RATE: 80 BPM | OXYGEN SATURATION: 94 % | RESPIRATION RATE: 16 BRPM | SYSTOLIC BLOOD PRESSURE: 131 MMHG | TEMPERATURE: 99 F

## 2021-05-26 LAB
-  AMIKACIN: SIGNIFICANT CHANGE UP
-  AMOXICILLIN/CLAVULANIC ACID: SIGNIFICANT CHANGE UP
-  AMPICILLIN/SULBACTAM: SIGNIFICANT CHANGE UP
-  AMPICILLIN: SIGNIFICANT CHANGE UP
-  AZTREONAM: SIGNIFICANT CHANGE UP
-  CEFAZOLIN: SIGNIFICANT CHANGE UP
-  CEFEPIME: SIGNIFICANT CHANGE UP
-  CEFOXITIN: SIGNIFICANT CHANGE UP
-  CEFTRIAXONE: SIGNIFICANT CHANGE UP
-  CIPROFLOXACIN: SIGNIFICANT CHANGE UP
-  ERTAPENEM: SIGNIFICANT CHANGE UP
-  GENTAMICIN: SIGNIFICANT CHANGE UP
-  IMIPENEM: SIGNIFICANT CHANGE UP
-  LEVOFLOXACIN: SIGNIFICANT CHANGE UP
-  MEROPENEM: SIGNIFICANT CHANGE UP
-  NITROFURANTOIN: SIGNIFICANT CHANGE UP
-  PIPERACILLIN/TAZOBACTAM: SIGNIFICANT CHANGE UP
-  TIGECYCLINE: SIGNIFICANT CHANGE UP
-  TOBRAMYCIN: SIGNIFICANT CHANGE UP
-  TRIMETHOPRIM/SULFAMETHOXAZOLE: SIGNIFICANT CHANGE UP
ANION GAP SERPL CALC-SCNC: 7 MMOL/L — SIGNIFICANT CHANGE UP (ref 5–17)
BASOPHILS # BLD AUTO: 0.04 K/UL — SIGNIFICANT CHANGE UP (ref 0–0.2)
BASOPHILS NFR BLD AUTO: 0.6 % — SIGNIFICANT CHANGE UP (ref 0–2)
BUN SERPL-MCNC: 23 MG/DL — SIGNIFICANT CHANGE UP (ref 7–23)
CALCIUM SERPL-MCNC: 8.7 MG/DL — SIGNIFICANT CHANGE UP (ref 8.5–10.1)
CHLORIDE SERPL-SCNC: 102 MMOL/L — SIGNIFICANT CHANGE UP (ref 96–108)
CO2 SERPL-SCNC: 27 MMOL/L — SIGNIFICANT CHANGE UP (ref 22–31)
CREAT SERPL-MCNC: 1.11 MG/DL — SIGNIFICANT CHANGE UP (ref 0.5–1.3)
CULTURE RESULTS: SIGNIFICANT CHANGE UP
EOSINOPHIL # BLD AUTO: 0.06 K/UL — SIGNIFICANT CHANGE UP (ref 0–0.5)
EOSINOPHIL NFR BLD AUTO: 1 % — SIGNIFICANT CHANGE UP (ref 0–6)
GLUCOSE SERPL-MCNC: 209 MG/DL — HIGH (ref 70–99)
GRAM STN FLD: SIGNIFICANT CHANGE UP
HCT VFR BLD CALC: 33.8 % — LOW (ref 39–50)
HGB BLD-MCNC: 11.3 G/DL — LOW (ref 13–17)
IMM GRANULOCYTES NFR BLD AUTO: 1 % — SIGNIFICANT CHANGE UP (ref 0–1.5)
LYMPHOCYTES # BLD AUTO: 0.8 K/UL — LOW (ref 1–3.3)
LYMPHOCYTES # BLD AUTO: 13 % — SIGNIFICANT CHANGE UP (ref 13–44)
MCHC RBC-ENTMCNC: 29.7 PG — SIGNIFICANT CHANGE UP (ref 27–34)
MCHC RBC-ENTMCNC: 33.4 GM/DL — SIGNIFICANT CHANGE UP (ref 32–36)
MCV RBC AUTO: 88.7 FL — SIGNIFICANT CHANGE UP (ref 80–100)
METHOD TYPE: SIGNIFICANT CHANGE UP
MONOCYTES # BLD AUTO: 0.74 K/UL — SIGNIFICANT CHANGE UP (ref 0–0.9)
MONOCYTES NFR BLD AUTO: 12 % — SIGNIFICANT CHANGE UP (ref 2–14)
NEUTROPHILS # BLD AUTO: 4.46 K/UL — SIGNIFICANT CHANGE UP (ref 1.8–7.4)
NEUTROPHILS NFR BLD AUTO: 72.4 % — SIGNIFICANT CHANGE UP (ref 43–77)
ORGANISM # SPEC MICROSCOPIC CNT: SIGNIFICANT CHANGE UP
ORGANISM # SPEC MICROSCOPIC CNT: SIGNIFICANT CHANGE UP
PLATELET # BLD AUTO: 145 K/UL — LOW (ref 150–400)
POTASSIUM SERPL-MCNC: 3.2 MMOL/L — LOW (ref 3.5–5.3)
POTASSIUM SERPL-SCNC: 3.2 MMOL/L — LOW (ref 3.5–5.3)
RBC # BLD: 3.81 M/UL — LOW (ref 4.2–5.8)
RBC # FLD: 14.6 % — HIGH (ref 10.3–14.5)
SODIUM SERPL-SCNC: 136 MMOL/L — SIGNIFICANT CHANGE UP (ref 135–145)
SPECIMEN SOURCE: SIGNIFICANT CHANGE UP
WBC # BLD: 6.16 K/UL — SIGNIFICANT CHANGE UP (ref 3.8–10.5)
WBC # FLD AUTO: 6.16 K/UL — SIGNIFICANT CHANGE UP (ref 3.8–10.5)

## 2021-05-26 PROCEDURE — 99239 HOSP IP/OBS DSCHRG MGMT >30: CPT

## 2021-05-26 RX ORDER — POTASSIUM CHLORIDE 20 MEQ
40 PACKET (EA) ORAL ONCE
Refills: 0 | Status: COMPLETED | OUTPATIENT
Start: 2021-05-26 | End: 2021-05-26

## 2021-05-26 RX ORDER — CEFUROXIME AXETIL 250 MG
250 TABLET ORAL EVERY 12 HOURS
Refills: 0 | Status: DISCONTINUED | OUTPATIENT
Start: 2021-05-26 | End: 2021-05-26

## 2021-05-26 RX ADMIN — Medication 81 MILLIGRAM(S): at 09:50

## 2021-05-26 RX ADMIN — Medication 6: at 12:12

## 2021-05-26 RX ADMIN — Medication 12.5 MILLIGRAM(S): at 09:51

## 2021-05-26 RX ADMIN — AMLODIPINE BESYLATE 5 MILLIGRAM(S): 2.5 TABLET ORAL at 09:50

## 2021-05-26 RX ADMIN — LISINOPRIL 40 MILLIGRAM(S): 2.5 TABLET ORAL at 09:51

## 2021-05-26 RX ADMIN — Medication 200 MILLIGRAM(S): at 09:51

## 2021-05-26 RX ADMIN — FINASTERIDE 5 MILLIGRAM(S): 5 TABLET, FILM COATED ORAL at 09:51

## 2021-05-26 RX ADMIN — Medication 40 MILLIEQUIVALENT(S): at 09:50

## 2021-05-26 RX ADMIN — POLYETHYLENE GLYCOL 3350 17 GRAM(S): 17 POWDER, FOR SOLUTION ORAL at 09:51

## 2021-05-26 RX ADMIN — Medication 4: at 08:48

## 2021-05-26 NOTE — DISCHARGE NOTE NURSING/CASE MANAGEMENT/SOCIAL WORK - NSDPLANG ASIS_GEN_ALL_CORE
Brendon nurse from [facility] calling because patient is schedule for iron infusion tomorrow and he is unsure if he should still come. Received one dose in hospital on 4/11 of bettyofer . Per Dr. Lucas needs to come in tomorrow for one dose of Feraheme. Brendon stated she would relay message to patient and he is set up to come tomorrow from their stand point.  
No

## 2021-05-26 NOTE — DISCHARGE NOTE PROVIDER - NSDCMRMEDTOKEN_GEN_ALL_CORE_FT
Albuterol (Eqv-Proventil HFA) 90 mcg/inh inhalation aerosol: 2 puff(s) inhaled every 6 hours, As Needed - for shortness of breath and/or wheezing  amLODIPine 5 mg oral tablet: 1 tab(s) orally once a day (in the evening)  Aspirin Enteric Coated 81 mg oral delayed release tablet: 1 tab(s) orally once a day  atorvastatin 40 mg oral tablet: 1 tab(s) orally once a day  Augmentin 875 mg-125 mg oral tablet: 1 tab(s) orally every 12 hours  ***Course Completed***  doxazosin 4 mg oral tablet: 1 tab(s) orally once a day  doxycycline hyclate 100 mg oral tablet: 1 tab(s) orally 2 times a day  ***Course Completed***  finasteride 5 mg oral tablet: 1 tab(s) orally once a day  glimepiride 1 mg oral tablet: 1 tab(s) orally once a day  hydroCHLOROthiazide 12.5 mg oral capsule: 1 cap(s) orally once a day  Janumet 50 mg-1000 mg oral tablet: 1 tab(s) orally 2 times a day  metoprolol succinate 200 mg oral tablet, extended release: 1 tab(s) orally once a day (in the evening)  Moderna COVID-19 Vaccine  mcg/0.5 mL intramuscular suspension: 0.5 milliliter(s) intramuscular once  ***2nd dose in April***  ramipril 10 mg oral capsule: 1 cap(s) orally once a day  traMADol 50 mg oral tablet: 1 tab(s) orally once a day in the morning- may take 2nd tab later in the day as needed   Albuterol (Eqv-Proventil HFA) 90 mcg/inh inhalation aerosol: 2 puff(s) inhaled every 6 hours, As Needed - for shortness of breath and/or wheezing  amLODIPine 5 mg oral tablet: 1 tab(s) orally once a day (in the evening)  Aspirin Enteric Coated 81 mg oral delayed release tablet: 1 tab(s) orally once a day  atorvastatin 40 mg oral tablet: 1 tab(s) orally once a day  cefuroxime 250 mg oral tablet: 1 tab(s) orally every 12 hours  doxazosin 4 mg oral tablet: 1 tab(s) orally once a day  finasteride 5 mg oral tablet: 1 tab(s) orally once a day  glimepiride 1 mg oral tablet: 1 tab(s) orally once a day  hydroCHLOROthiazide 12.5 mg oral capsule: 1 cap(s) orally once a day  Janumet 50 mg-1000 mg oral tablet: 1 tab(s) orally 2 times a day  metoprolol succinate 200 mg oral tablet, extended release: 1 tab(s) orally once a day (in the evening)  Moderna COVID-19 Vaccine  mcg/0.5 mL intramuscular suspension: 0.5 milliliter(s) intramuscular once  ***2nd dose in April***  ramipril 10 mg oral capsule: 1 cap(s) orally once a day  traMADol 50 mg oral tablet: 1 tab(s) orally once a day in the morning- may take 2nd tab later in the day as needed

## 2021-05-26 NOTE — DISCHARGE NOTE PROVIDER - HOSPITAL COURSE
80M w/ CAD, HTN, HLD, T2DM, BPH, kidney stones w/ recent surgical removal on 5/20 presenting with fever x 1 day. Pt had kidney stones removed from left kidney at [facility] w/ Dr. Mic Case. Reports left flank pain and malaise. Reports urinary frequency, dysuria or gross hematuria. Denies cough, localized abdominal pain, nausea, vomiting, diarrhea, drainage/erythema from surgical site.  (24 May 2021 22:23)    SUBJECTIVE:   Patient seen and examined at bedside.  Hospital course and discharge plan discussed with the patient.  Patient understands the importance of going to follow up with his PCP for Abx sensitivity results and further management and treatment.     REVIEW OF SYSTEMS:  CONSTITUTIONAL: No weakness, +fevers and chills  EYES/ENT: No visual changes;  No vertigo or throat pain   NECK: No pain or stiffness  RESPIRATORY: No cough, wheezing, hemoptysis; No shortness of breath  CARDIOVASCULAR: No chest pain or palpitations  GASTROINTESTINAL: No abdominal or epigastric pain. No nausea, vomiting, or hematemesis; No diarrhea or constipation. No melena or hematochezia.  GENITOURINARY: No dysuria, +frequency,  +hematuria, +urgency  NEUROLOGICAL: No numbness or weakness  SKIN: No itching, burning, rashes, or lesions   All other review of systems is negative unless indicated above    Vital Signs Last 24 Hrs  T(C): 37.2 (26 May 2021 07:50), Max: 37.9 (25 May 2021 21:28)  T(F): 98.9 (26 May 2021 07:50), Max: 100.2 (25 May 2021 21:28)  HR: 80 (26 May 2021 07:50) (79 - 81)  BP: 131/80 (26 May 2021 07:50) (120/61 - 148/68)  RR: 16 (26 May 2021 07:50) (16 - 16)  SpO2: 94% (26 May 2021 07:50) (94% - 95%)    CAPILLARY BLOOD GLUCOSE  POCT Blood Glucose.: 225 mg/dL (26 May 2021 08:05)  POCT Blood Glucose.: 267 mg/dL (25 May 2021 21:11)  POCT Blood Glucose.: 215 mg/dL (25 May 2021 17:09)  POCT Blood Glucose.: 209 mg/dL (25 May 2021 12:29)    PHYSICAL EXAM:  Constitutional: NAD, awake and alert, well-developed  HEENT: PERR, EOMI, Normal Hearing, MMM  Neck: Soft and supple, No LAD, No JVD  Respiratory: Breath sounds are clear bilaterally, No wheezing, rales or rhonchi  Cardiovascular: S1 and S2, regular rate and rhythm, no Murmurs, gallops or rubs  Gastrointestinal: Bowel Sounds present, soft, nontender, nondistended, no guarding, no rebound  Extremities: No peripheral edema  Vascular: 2+ peripheral pulses  Neurological: A/O x 3, no focal deficits  Musculoskeletal: 5/5 strength b/l upper and lower extremities  Skin: No rashes    LABS: All Labs Reviewed:                        11.3   6.16  )-----------( 145      ( 26 May 2021 06:33 )             33.8   05-26    136  |  102  |  23  ----------------------------<  209<H>  3.2<L>   |  27  |  1.11    Ca    8.7      26 May 2021 06:33    TPro  7.8  /  Alb  3.1<L>  /  TBili  0.7  /  DBili  x   /  AST  11<L>  /  ALT  15  /  AlkPhos  73  05-24    Culture:  >100,000 CFU/ml Klebsiella pneumoniae (05.24.21 @ 18:03) 80M w/ CAD, HTN, HLD, T2DM, BPH, kidney stones w/ recent surgical removal on 5/20 presenting with fever x 1 day. Pt had kidney stones removed from left kidney at [place] w/ Dr. Mic Case. Reports left flank pain and malaise. Patient diagnosed with UTI. Cultire significant for Klebsiella Pneumonia. Patient will be discharge on Ceftin and will f/u with PCP on Friday.     SUBJECTIVE:   Patient seen and examined at bedside.  Hospital course and discharge plan discussed with the patient.  Patient understands the importance of going to follow up with his PCP for Abx sensitivity results and further management and treatment.     Vital Signs Last 24 Hrs  T(C): 37.2 (26 May 2021 07:50), Max: 37.9 (25 May 2021 21:28)  T(F): 98.9 (26 May 2021 07:50), Max: 100.2 (25 May 2021 21:28)  HR: 80 (26 May 2021 07:50) (79 - 81)  BP: 131/80 (26 May 2021 07:50) (120/61 - 148/68)  RR: 16 (26 May 2021 07:50) (16 - 16)  SpO2: 94% (26 May 2021 07:50) (94% - 95%)    PHYSICAL EXAM:  Constitutional: NAD, awake and alert, well-developed  HEENT: PERR, EOMI, Normal Hearing, MMM  Neck: Soft and supple, No LAD, No JVD  Respiratory: Breath sounds are clear bilaterally, No wheezing, rales or rhonchi  Cardiovascular: S1 and S2, regular rate and rhythm, no Murmurs, gallops or rubs  Gastrointestinal: Bowel Sounds present, soft, nontender, nondistended, no guarding, no rebound  Extremities: No peripheral edema  Vascular: 2+ peripheral pulses  Neurological: A/O x 3, no focal deficits  Musculoskeletal: 5/5 strength b/l upper and lower extremities  Skin: No rashes    LABS: All Labs Reviewed:                        11.3   6.16  )-----------( 145      ( 26 May 2021 06:33 )             33.8   05-26    136  |  102  |  23  ----------------------------<  209<H>  3.2<L>   |  27  |  1.11    Ca    8.7      26 May 2021 06:33    TPro  7.8  /  Alb  3.1<L>  /  TBili  0.7  /  DBili  x   /  AST  11<L>  /  ALT  15  /  AlkPhos  73  05-24    Culture:  >100,000 CFU/ml Klebsiella pneumoniae (05.24.21 @ 18:03)

## 2021-05-26 NOTE — DISCHARGE NOTE PROVIDER - CARE PROVIDER_API CALL
Dg Collier)  Family Medicine  [address] [place] [address]  Phone: [phone]  Fax: [phone]  Follow Up Time:

## 2021-05-26 NOTE — CHART NOTE - NSCHARTNOTEFT_GEN_A_CORE
Contacted patient's PCP Dg Mariano [phone], to inform about patient's hospital stay and discharge plan including Antibiotic regimen was discussed with them.  They will follow up on Urine culture sensitivities and change treatment if necessary.  An appointment for Friday 5/28/21 at 11:15 AM was scheduled for the patient.

## 2021-05-26 NOTE — DISCHARGE NOTE PROVIDER - NSDCCPCAREPLAN_GEN_ALL_CORE_FT
PRINCIPAL DISCHARGE DIAGNOSIS  Diagnosis: Acute cystitis with hematuria  Assessment and Plan of Treatment: - You came to the hospital with increase urinary frequency, urgency and blood in your urine.  You were started on antibiotics,  urine culture came positive, sensitivity still pending.   -It is really important that you follow up tomorrow or friday with your PCP and if needed he will change the antibiotics based on the sensitivity.  -It is important that you continue taking your antibiotics as prescribed and follow up with your PCP for further management and treatment.  -Please go to the ED or call 911 if you start experiencing severe abdominal pain, urinary incontinence, blood in your urine, fever, chills, confusion, shortness of breath or chest pain.      SECONDARY DISCHARGE DIAGNOSES  Diagnosis: Anemia  Assessment and Plan of Treatment: - On admission you were found to have low hemoglobin level, possibly secondary to your recent procedure.  - it is important that you follow up with your PCP for a repeated blood work and treatment if necessary.    Diagnosis: Diabetes  Assessment and Plan of Treatment: - Please continue all your medications as prescribed and follow up with your PCP for further management and treatment.    Diagnosis: CAD (coronary artery disease)  Assessment and Plan of Treatment: - Please continue all your medications as prescribed and follow up with your PCP for further management and treatment.    Diagnosis: Hypertension  Assessment and Plan of Treatment: - Please continue all your medications as prescribed and follow up with your PCP for further management and treatment.    Diagnosis: Hyperlipidemia  Assessment and Plan of Treatment: - Please continue all your medications as prescribed and follow up with your PCP for further management and treatment.    Diagnosis: BPH (benign prostatic hyperplasia)  Assessment and Plan of Treatment: - Please continue all your medications as prescribed and follow up with your PCP for further management and treatment.

## 2021-05-26 NOTE — DISCHARGE NOTE NURSING/CASE MANAGEMENT/SOCIAL WORK - PATIENT PORTAL LINK FT
You can access the FollowMyHealth Patient Portal offered by [facility] by registering at the following website: [email or web address]. By joining [employer]’s FollowMyHealth portal, you will also be able to view your health information using other applications (apps) compatible with our system.

## 2021-05-26 NOTE — CDI QUERY NOTE - NSCDIOTHERTXTBX_GEN_ALL_CORE_HH
Documentation reveals : Patient  with fever x 1 day being admitted for urinary tract infection s/p procedure.   kidney stones w/ recent surgical removal on 5/20    Pt had kidney stones removed from left kidney at [facility]     5/25 documentation : #UTI  - CT A&P: nonspecific stranding of b/l perirenal fat w/ perirenal fluid, L> R, multiple calculi, +indwelling left ureteral stent w/o calculi in stent    Urine Culture Results:   >100,000 CFU/ml Klebsiella pneumoniae (05.24.21 @ 18:03)     Ceftriazone 1gram IV x 2 given  Now on ceftin po    Please clarify if the UTI is due to or related to the recent surgical procedure  a) UTI related to recent surgical removal of kidney stones  b) UTI related to indwelling ureteral stent  c) Unable to determine if UTI is due to or related to the recent surgical procedure or stent  d) Other, please clarify

## 2021-05-27 LAB
-  ESBL: SIGNIFICANT CHANGE UP
-  K. PNEUMONIAE GROUP: SIGNIFICANT CHANGE UP
METHOD TYPE: SIGNIFICANT CHANGE UP

## 2021-05-27 RX ORDER — CEFUROXIME AXETIL 250 MG
1 TABLET ORAL
Qty: 12 | Refills: 0
Start: 2021-05-27 | End: 2021-06-01

## 2021-05-28 ENCOUNTER — INPATIENT (INPATIENT)
Facility: HOSPITAL | Age: 81
LOS: 2 days | Discharge: HOME HEALTH | DRG: 862 | End: 2021-05-31
Attending: HOSPITALIST | Admitting: HOSPITALIST
Payer: MEDICARE

## 2021-05-28 VITALS — HEIGHT: 72 IN | WEIGHT: 199.96 LBS

## 2021-05-28 DIAGNOSIS — I26.99 OTHER PULMONARY EMBOLISM WITHOUT ACUTE COR PULMONALE: ICD-10-CM

## 2021-05-28 DIAGNOSIS — Z98.89 OTHER SPECIFIED POSTPROCEDURAL STATES: Chronic | ICD-10-CM

## 2021-05-28 DIAGNOSIS — Z98.1 ARTHRODESIS STATUS: Chronic | ICD-10-CM

## 2021-05-28 DIAGNOSIS — Z95.5 PRESENCE OF CORONARY ANGIOPLASTY IMPLANT AND GRAFT: Chronic | ICD-10-CM

## 2021-05-28 DIAGNOSIS — Z98.49 CATARACT EXTRACTION STATUS, UNSPECIFIED EYE: Chronic | ICD-10-CM

## 2021-05-28 DIAGNOSIS — R78.81 BACTEREMIA: ICD-10-CM

## 2021-05-28 LAB
-  AMIKACIN: SIGNIFICANT CHANGE UP
-  AMPICILLIN/SULBACTAM: SIGNIFICANT CHANGE UP
-  AMPICILLIN: SIGNIFICANT CHANGE UP
-  AZTREONAM: SIGNIFICANT CHANGE UP
-  CEFAZOLIN: SIGNIFICANT CHANGE UP
-  CEFEPIME: SIGNIFICANT CHANGE UP
-  CEFOXITIN: SIGNIFICANT CHANGE UP
-  CEFTRIAXONE: SIGNIFICANT CHANGE UP
-  CIPROFLOXACIN: SIGNIFICANT CHANGE UP
-  ERTAPENEM: SIGNIFICANT CHANGE UP
-  GENTAMICIN: SIGNIFICANT CHANGE UP
-  IMIPENEM: SIGNIFICANT CHANGE UP
-  LEVOFLOXACIN: SIGNIFICANT CHANGE UP
-  MEROPENEM: SIGNIFICANT CHANGE UP
-  PIPERACILLIN/TAZOBACTAM: SIGNIFICANT CHANGE UP
-  TOBRAMYCIN: SIGNIFICANT CHANGE UP
-  TRIMETHOPRIM/SULFAMETHOXAZOLE: SIGNIFICANT CHANGE UP
ALBUMIN SERPL ELPH-MCNC: 2.7 G/DL — LOW (ref 3.3–5)
ALP SERPL-CCNC: 87 U/L — SIGNIFICANT CHANGE UP (ref 40–120)
ALT FLD-CCNC: 115 U/L — HIGH (ref 12–78)
ANION GAP SERPL CALC-SCNC: 5 MMOL/L — SIGNIFICANT CHANGE UP (ref 5–17)
APPEARANCE UR: ABNORMAL
APTT BLD: 26.1 SEC — LOW (ref 27.5–35.5)
AST SERPL-CCNC: 45 U/L — HIGH (ref 15–37)
BASOPHILS # BLD AUTO: 0.04 K/UL — SIGNIFICANT CHANGE UP (ref 0–0.2)
BASOPHILS NFR BLD AUTO: 0.7 % — SIGNIFICANT CHANGE UP (ref 0–2)
BILIRUB SERPL-MCNC: 0.3 MG/DL — SIGNIFICANT CHANGE UP (ref 0.2–1.2)
BILIRUB UR-MCNC: NEGATIVE — SIGNIFICANT CHANGE UP
BUN SERPL-MCNC: 27 MG/DL — HIGH (ref 7–23)
CALCIUM SERPL-MCNC: 9.6 MG/DL — SIGNIFICANT CHANGE UP (ref 8.5–10.1)
CHLORIDE SERPL-SCNC: 104 MMOL/L — SIGNIFICANT CHANGE UP (ref 96–108)
CO2 SERPL-SCNC: 30 MMOL/L — SIGNIFICANT CHANGE UP (ref 22–31)
COLOR SPEC: YELLOW — SIGNIFICANT CHANGE UP
CREAT SERPL-MCNC: 1.28 MG/DL — SIGNIFICANT CHANGE UP (ref 0.5–1.3)
CULTURE RESULTS: SIGNIFICANT CHANGE UP
DIFF PNL FLD: ABNORMAL
EOSINOPHIL # BLD AUTO: 0.12 K/UL — SIGNIFICANT CHANGE UP (ref 0–0.5)
EOSINOPHIL NFR BLD AUTO: 2.2 % — SIGNIFICANT CHANGE UP (ref 0–6)
GLUCOSE SERPL-MCNC: 202 MG/DL — HIGH (ref 70–99)
GLUCOSE UR QL: 250 MG/DL
HCT VFR BLD CALC: 36.8 % — LOW (ref 39–50)
HGB BLD-MCNC: 11.9 G/DL — LOW (ref 13–17)
IMM GRANULOCYTES NFR BLD AUTO: 1.7 % — HIGH (ref 0–1.5)
INR BLD: 1.02 RATIO — SIGNIFICANT CHANGE UP (ref 0.88–1.16)
KETONES UR-MCNC: ABNORMAL
LACTATE SERPL-SCNC: 1.1 MMOL/L — SIGNIFICANT CHANGE UP (ref 0.7–2)
LEUKOCYTE ESTERASE UR-ACNC: ABNORMAL
LYMPHOCYTES # BLD AUTO: 1.1 K/UL — SIGNIFICANT CHANGE UP (ref 1–3.3)
LYMPHOCYTES # BLD AUTO: 20.6 % — SIGNIFICANT CHANGE UP (ref 13–44)
MCHC RBC-ENTMCNC: 29.2 PG — SIGNIFICANT CHANGE UP (ref 27–34)
MCHC RBC-ENTMCNC: 32.3 GM/DL — SIGNIFICANT CHANGE UP (ref 32–36)
MCV RBC AUTO: 90.2 FL — SIGNIFICANT CHANGE UP (ref 80–100)
METHOD TYPE: SIGNIFICANT CHANGE UP
MONOCYTES # BLD AUTO: 0.51 K/UL — SIGNIFICANT CHANGE UP (ref 0–0.9)
MONOCYTES NFR BLD AUTO: 9.6 % — SIGNIFICANT CHANGE UP (ref 2–14)
NEUTROPHILS # BLD AUTO: 3.48 K/UL — SIGNIFICANT CHANGE UP (ref 1.8–7.4)
NEUTROPHILS NFR BLD AUTO: 65.2 % — SIGNIFICANT CHANGE UP (ref 43–77)
NITRITE UR-MCNC: POSITIVE
ORGANISM # SPEC MICROSCOPIC CNT: SIGNIFICANT CHANGE UP
PH UR: 6 — SIGNIFICANT CHANGE UP (ref 5–8)
PLATELET # BLD AUTO: 227 K/UL — SIGNIFICANT CHANGE UP (ref 150–400)
POTASSIUM SERPL-MCNC: 4.6 MMOL/L — SIGNIFICANT CHANGE UP (ref 3.5–5.3)
POTASSIUM SERPL-SCNC: 4.6 MMOL/L — SIGNIFICANT CHANGE UP (ref 3.5–5.3)
PROT SERPL-MCNC: 7.6 GM/DL — SIGNIFICANT CHANGE UP (ref 6–8.3)
PROT UR-MCNC: 100 MG/DL
PROTHROM AB SERPL-ACNC: 11.8 SEC — SIGNIFICANT CHANGE UP (ref 10.6–13.6)
RBC # BLD: 4.08 M/UL — LOW (ref 4.2–5.8)
RBC # FLD: 14.8 % — HIGH (ref 10.3–14.5)
SARS-COV-2 RNA SPEC QL NAA+PROBE: SIGNIFICANT CHANGE UP
SODIUM SERPL-SCNC: 139 MMOL/L — SIGNIFICANT CHANGE UP (ref 135–145)
SP GR SPEC: 1.01 — SIGNIFICANT CHANGE UP (ref 1.01–1.02)
SPECIMEN SOURCE: SIGNIFICANT CHANGE UP
UROBILINOGEN FLD QL: NEGATIVE MG/DL — SIGNIFICANT CHANGE UP
WBC # BLD: 5.34 K/UL — SIGNIFICANT CHANGE UP (ref 3.8–10.5)
WBC # FLD AUTO: 5.34 K/UL — SIGNIFICANT CHANGE UP (ref 3.8–10.5)

## 2021-05-28 PROCEDURE — 85025 COMPLETE CBC W/AUTO DIFF WBC: CPT

## 2021-05-28 PROCEDURE — 80053 COMPREHEN METABOLIC PANEL: CPT

## 2021-05-28 PROCEDURE — U0005: CPT

## 2021-05-28 PROCEDURE — 36415 COLL VENOUS BLD VENIPUNCTURE: CPT

## 2021-05-28 PROCEDURE — 84100 ASSAY OF PHOSPHORUS: CPT

## 2021-05-28 PROCEDURE — 87040 BLOOD CULTURE FOR BACTERIA: CPT

## 2021-05-28 PROCEDURE — 99223 1ST HOSP IP/OBS HIGH 75: CPT | Mod: GC

## 2021-05-28 PROCEDURE — 71045 X-RAY EXAM CHEST 1 VIEW: CPT | Mod: 26

## 2021-05-28 PROCEDURE — 83605 ASSAY OF LACTIC ACID: CPT

## 2021-05-28 PROCEDURE — 85610 PROTHROMBIN TIME: CPT

## 2021-05-28 PROCEDURE — U0003: CPT

## 2021-05-28 PROCEDURE — 86769 SARS-COV-2 COVID-19 ANTIBODY: CPT

## 2021-05-28 PROCEDURE — 83735 ASSAY OF MAGNESIUM: CPT

## 2021-05-28 PROCEDURE — 85730 THROMBOPLASTIN TIME PARTIAL: CPT

## 2021-05-28 PROCEDURE — 82962 GLUCOSE BLOOD TEST: CPT

## 2021-05-28 PROCEDURE — 93010 ELECTROCARDIOGRAM REPORT: CPT

## 2021-05-28 PROCEDURE — 99285 EMERGENCY DEPT VISIT HI MDM: CPT

## 2021-05-28 RX ORDER — METOPROLOL TARTRATE 50 MG
200 TABLET ORAL DAILY
Refills: 0 | Status: DISCONTINUED | OUTPATIENT
Start: 2021-05-28 | End: 2021-05-31

## 2021-05-28 RX ORDER — DEXTROSE 50 % IN WATER 50 %
15 SYRINGE (ML) INTRAVENOUS ONCE
Refills: 0 | Status: DISCONTINUED | OUTPATIENT
Start: 2021-05-28 | End: 2021-05-31

## 2021-05-28 RX ORDER — DOXAZOSIN MESYLATE 4 MG
4 TABLET ORAL DAILY
Refills: 0 | Status: DISCONTINUED | OUTPATIENT
Start: 2021-05-28 | End: 2021-05-31

## 2021-05-28 RX ORDER — LISINOPRIL 2.5 MG/1
10 TABLET ORAL DAILY
Refills: 0 | Status: DISCONTINUED | OUTPATIENT
Start: 2021-05-28 | End: 2021-05-31

## 2021-05-28 RX ORDER — DEXTROSE 50 % IN WATER 50 %
25 SYRINGE (ML) INTRAVENOUS ONCE
Refills: 0 | Status: DISCONTINUED | OUTPATIENT
Start: 2021-05-28 | End: 2021-05-31

## 2021-05-28 RX ORDER — ASPIRIN/CALCIUM CARB/MAGNESIUM 324 MG
81 TABLET ORAL DAILY
Refills: 0 | Status: DISCONTINUED | OUTPATIENT
Start: 2021-05-28 | End: 2021-05-31

## 2021-05-28 RX ORDER — ATORVASTATIN CALCIUM 80 MG/1
40 TABLET, FILM COATED ORAL DAILY
Refills: 0 | Status: DISCONTINUED | OUTPATIENT
Start: 2021-05-28 | End: 2021-05-28

## 2021-05-28 RX ORDER — DEXTROSE 50 % IN WATER 50 %
12.5 SYRINGE (ML) INTRAVENOUS ONCE
Refills: 0 | Status: DISCONTINUED | OUTPATIENT
Start: 2021-05-28 | End: 2021-05-31

## 2021-05-28 RX ORDER — ALBUTEROL 90 UG/1
2 AEROSOL, METERED ORAL EVERY 6 HOURS
Refills: 0 | Status: DISCONTINUED | OUTPATIENT
Start: 2021-05-28 | End: 2021-05-31

## 2021-05-28 RX ORDER — FINASTERIDE 5 MG/1
5 TABLET, FILM COATED ORAL DAILY
Refills: 0 | Status: DISCONTINUED | OUTPATIENT
Start: 2021-05-28 | End: 2021-05-31

## 2021-05-28 RX ORDER — MEROPENEM 1 G/30ML
1000 INJECTION INTRAVENOUS ONCE
Refills: 0 | Status: COMPLETED | OUTPATIENT
Start: 2021-05-28 | End: 2021-05-28

## 2021-05-28 RX ORDER — ATORVASTATIN CALCIUM 80 MG/1
40 TABLET, FILM COATED ORAL AT BEDTIME
Refills: 0 | Status: DISCONTINUED | OUTPATIENT
Start: 2021-05-28 | End: 2021-05-31

## 2021-05-28 RX ORDER — SODIUM CHLORIDE 9 MG/ML
1000 INJECTION, SOLUTION INTRAVENOUS
Refills: 0 | Status: DISCONTINUED | OUTPATIENT
Start: 2021-05-28 | End: 2021-05-31

## 2021-05-28 RX ORDER — AMLODIPINE BESYLATE 2.5 MG/1
5 TABLET ORAL DAILY
Refills: 0 | Status: DISCONTINUED | OUTPATIENT
Start: 2021-05-28 | End: 2021-05-31

## 2021-05-28 RX ORDER — GLUCAGON INJECTION, SOLUTION 0.5 MG/.1ML
1 INJECTION, SOLUTION SUBCUTANEOUS ONCE
Refills: 0 | Status: DISCONTINUED | OUTPATIENT
Start: 2021-05-28 | End: 2021-05-31

## 2021-05-28 RX ORDER — HYDROCHLOROTHIAZIDE 25 MG
12.5 TABLET ORAL DAILY
Refills: 0 | Status: DISCONTINUED | OUTPATIENT
Start: 2021-05-28 | End: 2021-05-31

## 2021-05-28 RX ORDER — MEROPENEM 1 G/30ML
1000 INJECTION INTRAVENOUS EVERY 8 HOURS
Refills: 0 | Status: DISCONTINUED | OUTPATIENT
Start: 2021-05-29 | End: 2021-05-31

## 2021-05-28 RX ORDER — TRAMADOL HYDROCHLORIDE 50 MG/1
50 TABLET ORAL EVERY 6 HOURS
Refills: 0 | Status: DISCONTINUED | OUTPATIENT
Start: 2021-05-28 | End: 2021-05-31

## 2021-05-28 RX ORDER — INSULIN LISPRO 100/ML
VIAL (ML) SUBCUTANEOUS AT BEDTIME
Refills: 0 | Status: DISCONTINUED | OUTPATIENT
Start: 2021-05-28 | End: 2021-05-31

## 2021-05-28 RX ORDER — INSULIN LISPRO 100/ML
VIAL (ML) SUBCUTANEOUS
Refills: 0 | Status: DISCONTINUED | OUTPATIENT
Start: 2021-05-28 | End: 2021-05-31

## 2021-05-28 RX ORDER — TRAMADOL HYDROCHLORIDE 50 MG/1
1 TABLET ORAL
Qty: 0 | Refills: 0 | DISCHARGE

## 2021-05-28 RX ADMIN — ATORVASTATIN CALCIUM 40 MILLIGRAM(S): 80 TABLET, FILM COATED ORAL at 21:08

## 2021-05-28 RX ADMIN — MEROPENEM 100 MILLIGRAM(S): 1 INJECTION INTRAVENOUS at 16:51

## 2021-05-28 NOTE — H&P ADULT - NSHPPHYSICALEXAM_GEN_ALL_CORE
Constitutional: NAD, awake and alert, well-developed  HEENT: PERR, EOMI, Normal Hearing, MMM  Neck: Soft and supple, No LAD, No JVD  Respiratory: Breath sounds are clear bilaterally, No wheezing, rales or rhonchi  Cardiovascular: S1 and S2, regular rate and rhythm, no Murmurs, gallops or rubs  Gastrointestinal: Bowel Sounds present, soft, nontender, nondistended, no guarding, no rebound  Extremities: No peripheral edema  Vascular: 2+ peripheral pulses  Neurological: A/O x 3, no focal deficits  Musculoskeletal: 5/5 strength b/l upper and lower extremities  Skin: No rashes ICU Vital Signs Last 24 Hrs  T(C): 36.7 (29 May 2021 00:01), Max: 37.1 (28 May 2021 21:04)  T(F): 98.1 (29 May 2021 00:01), Max: 98.8 (28 May 2021 21:04)  HR: 91 (29 May 2021 00:01) (74 - 91)  BP: 138/81 (29 May 2021 00:01) (118/75 - 138/81)      BP(mean): 87 (28 May 2021 21:04) (87 - 89)  RR: 17 (29 May 2021 00:01) (17 - 18)  SpO2: 97% (29 May 2021 00:01) (97% - 99%)    Constitutional: NAD, awake and alert, well-developed  HEENT: PERR, EOMI, Normal Hearing, MMM  Neck: Soft and supple, No LAD, No JVD  Respiratory: Breath sounds are clear bilaterally, No wheezing, rales or rhonchi  Cardiovascular: S1 and S2, regular rate and rhythm, no Murmurs, gallops or rubs  Gastrointestinal: Bowel Sounds present, soft, nontender, nondistended, no guarding, no rebound  Extremities: No peripheral edema  Vascular: 2+ peripheral pulses  Neurological: A/O x 3, no focal deficits  Musculoskeletal: 5/5 strength b/l upper and lower extremities  Skin: No rashes

## 2021-05-28 NOTE — ED STATDOCS - OBJECTIVE STATEMENT
81 y/o male with PMHx of spinal stenosis, BPH, aortic stenosis, CAD, renal cyst, HLD, HTN, DM presents to the ED c/o abnormal labs. Pt instructed by MD Stern for positive blood cultures. Pt discharged yesterday for UTI, currently taking Ceftriaxone PO. Endorses improved urinary symptoms, but with persistent urinary frequency. Denies fever. NKDA. 81 y/o male with PMHx of spinal stenosis, BPH, aortic stenosis, CAD, renal cyst, HLD, HTN, DM presents to the ED c/o abnormal labs. Pt instructed by MD Stern for positive blood cultures. Pt discharged yesterday for UTI, currently taking PO antibiotics. Endorses improved urinary symptoms, but with persistent urinary frequency. Denies fever. NKDA.

## 2021-05-28 NOTE — H&P ADULT - NSHPSOCIALHISTORY_GEN_ALL_CORE
No tob/ETOH/drug use.  Pt lives at home  with his wife and grandson. He is usually independent in ADLs and IADLs.   Ambulates with cane.

## 2021-05-28 NOTE — H&P ADULT - ATTENDING COMMENTS
80M w/ CAD, HTN, HLD, T2DM, BPH, kidney stones w/ recent surgical removal on 5/20 presents to the ED for treatment of Klebsiella  ESBL noted on recent positive (5/24/21) blood and urine cultures.  - cont Meropenem as advised by Dr. Felix  - repeat blood cx  - ID to follow  - Adv Directives: pt signed MOLST on 5/24/21 and is DNR

## 2021-05-28 NOTE — ED ADULT TRIAGE NOTE - CHIEF COMPLAINT QUOTE
Pt presents to ER as instructed by MD Stern for positive blood culture result and evaluation for antibiotics. Pt previously admitted and discharged yesterday after treatment for urinary tract infection for which he states he received and is still currently taking Ceftriaxone. Pt presents to ER as instructed by MD Stern for positive blood culture result and evaluation for antibiotics. Pt previously admitted and discharged yesterday after treatment for urinary tract infection for which he states he received and is still currently taking Ceftriaxone. Denies fever after discharge.

## 2021-05-28 NOTE — ED ADULT NURSE NOTE - NSIMPLEMENTINTERV_GEN_ALL_ED
Implemented All Universal Safety Interventions:  [place] to call system. Call bell, personal items and telephone within reach. Instruct patient to call for assistance. Room bathroom lighting operational. Non-slip footwear when patient is off stretcher. Physically safe environment: no spills, clutter or unnecessary equipment. Stretcher in lowest position, wheels locked, appropriate side rails in place.

## 2021-05-28 NOTE — ED ADULT NURSE NOTE - CHIEF COMPLAINT QUOTE
Pt presents to ER as instructed by MD Stern for positive blood culture result and evaluation for antibiotics. Pt previously admitted and discharged yesterday after treatment for urinary tract infection for which he states he received and is still currently taking Ceftriaxone. Denies fever after discharge.

## 2021-05-28 NOTE — H&P ADULT - NSHPREVIEWOFSYSTEMS_GEN_ALL_CORE
CONSTITUTIONAL: mild weakness, No fevers and chills  EYES/ENT: No visual changes;  No vertigo or throat pain   NECK: No pain or stiffness  RESPIRATORY: No cough, wheezing, hemoptysis; No shortness of breath  CARDIOVASCULAR: No chest pain or palpitations  GASTROINTESTINAL: No abdominal or epigastric pain. No nausea, vomiting, or hematemesis; No diarrhea or constipation. No melena or hematochezia.  GENITOURINARY: No dysuria, +frequency,  no hematuria, no urgency  NEUROLOGICAL: No numbness or weakness  SKIN: No itching, burning, rashes, or lesions

## 2021-05-28 NOTE — H&P ADULT - HISTORY OF PRESENT ILLNESS
80M w/ CAD, HTN, HLD, T2DM, BPH, kidney stones w/ recent surgical removal on 5/20 presents to the ED for positive blood and urine cultures.  Patient was recently admitted for UTI and was discharged from [facility] on 5/26 with PO Ceftin.  Blood and urine cultures resolved today and showed to be Klebsiella Pneumonia ESBL.  Patient was contacted and was told to come back to get IV Abx.   Patient had an appointment with his PCP today and he mentions his urinary urgency resolved, but he's still having frequency and has generalized weakness.  Patient denies any SOB, palpitations, dysuria, hematuria, pelvic pain, fever, chills, or flank pain.

## 2021-05-28 NOTE — ED STATDOCS - ATTENDING CONTRIBUTION TO CARE
I, Krista Sood DO,  performed the initial face to face bedside interview with this patient regarding history of present illness, review of symptoms and relevant past medical, social and family history.  I completed an independent physical examination.  I was the initial provider who evaluated this patient. I have signed out the follow up of any pending tests (i.e. labs, radiological studies) to the ACP.  I have communicated the patient’s plan of care and disposition with the ACP.  The history, relevant review of systems, past medical and surgical history, medical decision making, and physical examination was documented by the scribe in my presence and I attest to the accuracy of the documentation.

## 2021-05-28 NOTE — ED STATDOCS - PROGRESS NOTE DETAILS
Pt. is a 80 year old male Hx spinal stenosis, BPH, CAD, HLD, HTN, DM presents to ED with postdrive blood culture.  Grew Klebsiella.  Pt. DC yesterday for Klebsiella UTI.  Pt. Cefuroxime.  Pt. with urinary frequency but denies F/C/S.  Elizabeth Langford PA-C Admission.  Elizabeth Langford PA-C

## 2021-05-28 NOTE — H&P ADULT - ASSESSMENT
80M w/ CAD, HTN, HLD, T2DM, BPH, kidney stones w/ recent surgical removal on 5/20 presenting with fever x 1 day being admitted for urinary tract infection s/p procedure.     #UTI   - possibly 2/2 to recent surgical removal of kidney stones  - Recently d/c on 5/26  - BCx and UCx from previous admission +Klebsiella Pneumonia ESBL  - UA: +WBC, +RBC, +nitrate +occasional bacteria  - s/p 1000mg Meropenem IV in the ED  - f/u urine and blood cultures  - f/u ID consult    #T2DM  - hold home oral antihyperglycemics  - ISS, reevaluate in 24 hours     #CAD/HTN/HLD/BPH  - chronic, stable  - continue home meds: aspirin, atorvastatin, amlodipine, ACEI, doxazosin, finasteride     #DVT PPx  - IMPROVE Score 1   - SCDs    #GOC  - DNR/DNI    *Case discussed with Dr. Lyle

## 2021-05-28 NOTE — CHART NOTE - NSCHARTNOTEFT_GEN_A_CORE
Contacted the patient today to give him an update on his +BCx and UCx results.  Patient mentions he is feeling good, he still has urinary frequency but no longer has any pain, urgency, fever or chills.  Patient had an appointment today with his PCP and was told to continue the same treatment. Contacted the patient today to give him an update on his +BCx and UCx results.  Cultures came back positive for Klebsiella Pneumonia +ESBL.  Patient mentions he is feeling good, he still has urinary frequency but no longer has any pain, urgency, fever or chills.  Patient had an appointment today with his PCP.  Patient was advised to come back to [facility] to get IV antibiotics.  He mentions he will come to the ED in the next couple of hours and understands the importance of getting his IV antibiotics. Contacted the patient today to give him an update on his +BCx and UCx results.  Cultures came back positive for Klebsiella Pneumonia +ESBL.  Patient mentions he is feeling good, he still has urinary frequency but no longer has any pain, urgency, fever or chills.  Patient had an appointment today with his PCP.  Patient was advised to come back to [facility] to get IV antibiotics.  He mentions he will come to the ED in the next couple of hours and understands the importance of getting his IV antibiotics.    Family Medicine Attending Addendum    PATRICE GOMEZ is a 80year old patient with positive blood and urine culture for K Pneumonia ESBL to be readmitted for IV meropenem. Agree with management plan as described except as modified below.

## 2021-05-29 LAB
ALBUMIN SERPL ELPH-MCNC: 2.7 G/DL — LOW (ref 3.3–5)
ALP SERPL-CCNC: 85 U/L — SIGNIFICANT CHANGE UP (ref 40–120)
ALT FLD-CCNC: 105 U/L — HIGH (ref 12–78)
ANION GAP SERPL CALC-SCNC: 6 MMOL/L — SIGNIFICANT CHANGE UP (ref 5–17)
AST SERPL-CCNC: 36 U/L — SIGNIFICANT CHANGE UP (ref 15–37)
BASOPHILS # BLD AUTO: 0.06 K/UL — SIGNIFICANT CHANGE UP (ref 0–0.2)
BASOPHILS NFR BLD AUTO: 0.9 % — SIGNIFICANT CHANGE UP (ref 0–2)
BILIRUB SERPL-MCNC: 0.4 MG/DL — SIGNIFICANT CHANGE UP (ref 0.2–1.2)
BUN SERPL-MCNC: 23 MG/DL — SIGNIFICANT CHANGE UP (ref 7–23)
CALCIUM SERPL-MCNC: 9.9 MG/DL — SIGNIFICANT CHANGE UP (ref 8.5–10.1)
CHLORIDE SERPL-SCNC: 103 MMOL/L — SIGNIFICANT CHANGE UP (ref 96–108)
CO2 SERPL-SCNC: 29 MMOL/L — SIGNIFICANT CHANGE UP (ref 22–31)
COVID-19 SPIKE DOMAIN AB INTERP: POSITIVE
COVID-19 SPIKE DOMAIN ANTIBODY RESULT: >250 U/ML — HIGH
CREAT SERPL-MCNC: 1.17 MG/DL — SIGNIFICANT CHANGE UP (ref 0.5–1.3)
CULTURE RESULTS: SIGNIFICANT CHANGE UP
EOSINOPHIL # BLD AUTO: 0.13 K/UL — SIGNIFICANT CHANGE UP (ref 0–0.5)
EOSINOPHIL NFR BLD AUTO: 2 % — SIGNIFICANT CHANGE UP (ref 0–6)
GLUCOSE SERPL-MCNC: 212 MG/DL — HIGH (ref 70–99)
HCT VFR BLD CALC: 37.3 % — LOW (ref 39–50)
HGB BLD-MCNC: 11.8 G/DL — LOW (ref 13–17)
IMM GRANULOCYTES NFR BLD AUTO: 1.7 % — HIGH (ref 0–1.5)
LYMPHOCYTES # BLD AUTO: 1.29 K/UL — SIGNIFICANT CHANGE UP (ref 1–3.3)
LYMPHOCYTES # BLD AUTO: 20.2 % — SIGNIFICANT CHANGE UP (ref 13–44)
MAGNESIUM SERPL-MCNC: 2 MG/DL — SIGNIFICANT CHANGE UP (ref 1.6–2.6)
MCHC RBC-ENTMCNC: 28.4 PG — SIGNIFICANT CHANGE UP (ref 27–34)
MCHC RBC-ENTMCNC: 31.6 GM/DL — LOW (ref 32–36)
MCV RBC AUTO: 89.9 FL — SIGNIFICANT CHANGE UP (ref 80–100)
MONOCYTES # BLD AUTO: 0.58 K/UL — SIGNIFICANT CHANGE UP (ref 0–0.9)
MONOCYTES NFR BLD AUTO: 9.1 % — SIGNIFICANT CHANGE UP (ref 2–14)
NEUTROPHILS # BLD AUTO: 4.23 K/UL — SIGNIFICANT CHANGE UP (ref 1.8–7.4)
NEUTROPHILS NFR BLD AUTO: 66.1 % — SIGNIFICANT CHANGE UP (ref 43–77)
PHOSPHATE SERPL-MCNC: 3 MG/DL — SIGNIFICANT CHANGE UP (ref 2.5–4.5)
PLATELET # BLD AUTO: 241 K/UL — SIGNIFICANT CHANGE UP (ref 150–400)
POTASSIUM SERPL-MCNC: 4.3 MMOL/L — SIGNIFICANT CHANGE UP (ref 3.5–5.3)
POTASSIUM SERPL-SCNC: 4.3 MMOL/L — SIGNIFICANT CHANGE UP (ref 3.5–5.3)
PROT SERPL-MCNC: 7.4 GM/DL — SIGNIFICANT CHANGE UP (ref 6–8.3)
RBC # BLD: 4.15 M/UL — LOW (ref 4.2–5.8)
RBC # FLD: 14.7 % — HIGH (ref 10.3–14.5)
SARS-COV-2 IGG+IGM SERPL QL IA: >250 U/ML — HIGH
SARS-COV-2 IGG+IGM SERPL QL IA: POSITIVE
SODIUM SERPL-SCNC: 138 MMOL/L — SIGNIFICANT CHANGE UP (ref 135–145)
SPECIMEN SOURCE: SIGNIFICANT CHANGE UP
WBC # BLD: 6.4 K/UL — SIGNIFICANT CHANGE UP (ref 3.8–10.5)
WBC # FLD AUTO: 6.4 K/UL — SIGNIFICANT CHANGE UP (ref 3.8–10.5)

## 2021-05-29 PROCEDURE — 99232 SBSQ HOSP IP/OBS MODERATE 35: CPT | Mod: GC

## 2021-05-29 RX ORDER — INSULIN GLARGINE 100 [IU]/ML
20 INJECTION, SOLUTION SUBCUTANEOUS AT BEDTIME
Refills: 0 | Status: DISCONTINUED | OUTPATIENT
Start: 2021-05-29 | End: 2021-05-30

## 2021-05-29 RX ORDER — HYDROCORTISONE 1 %
1 OINTMENT (GRAM) TOPICAL THREE TIMES A DAY
Refills: 0 | Status: DISCONTINUED | OUTPATIENT
Start: 2021-05-29 | End: 2021-05-31

## 2021-05-29 RX ADMIN — ATORVASTATIN CALCIUM 40 MILLIGRAM(S): 80 TABLET, FILM COATED ORAL at 21:33

## 2021-05-29 RX ADMIN — AMLODIPINE BESYLATE 5 MILLIGRAM(S): 2.5 TABLET ORAL at 09:40

## 2021-05-29 RX ADMIN — MEROPENEM 100 MILLIGRAM(S): 1 INJECTION INTRAVENOUS at 11:59

## 2021-05-29 RX ADMIN — Medication 81 MILLIGRAM(S): at 09:40

## 2021-05-29 RX ADMIN — MEROPENEM 100 MILLIGRAM(S): 1 INJECTION INTRAVENOUS at 03:00

## 2021-05-29 RX ADMIN — Medication 4: at 08:20

## 2021-05-29 RX ADMIN — FINASTERIDE 5 MILLIGRAM(S): 5 TABLET, FILM COATED ORAL at 09:40

## 2021-05-29 RX ADMIN — Medication 200 MILLIGRAM(S): at 09:40

## 2021-05-29 RX ADMIN — MEROPENEM 100 MILLIGRAM(S): 1 INJECTION INTRAVENOUS at 19:13

## 2021-05-29 RX ADMIN — Medication 6: at 12:08

## 2021-05-29 RX ADMIN — Medication 12.5 MILLIGRAM(S): at 09:41

## 2021-05-29 RX ADMIN — Medication 4 MILLIGRAM(S): at 09:41

## 2021-05-29 RX ADMIN — Medication 1 APPLICATION(S): at 16:52

## 2021-05-29 RX ADMIN — INSULIN GLARGINE 20 UNIT(S): 100 INJECTION, SOLUTION SUBCUTANEOUS at 21:34

## 2021-05-29 RX ADMIN — Medication 4: at 16:52

## 2021-05-29 RX ADMIN — Medication 1 APPLICATION(S): at 21:33

## 2021-05-29 RX ADMIN — LISINOPRIL 10 MILLIGRAM(S): 2.5 TABLET ORAL at 09:40

## 2021-05-29 NOTE — CONSULT NOTE ADULT - ASSESSMENT
80M w/ CAD, HTN, HLD, T2DM, BPH, kidney stones s/p L ureteral stent and stone tx 5/20 presents to the ED for positive blood and urine cultures on 5/28.  Patient was recently admitted for UTI and was discharged from [facility] on 5/26 with PO Ceftin.  Blood and urine cultures from 5/24 growing Klebsiella Pneumonia ESBL. Patient was contacted and was told to come back to get IV Abx.  Patient had an appointment with his  and he mentions his urinary urgency resolved, but he's still having frequency and has generalized weakness. Patient denies any SOB, palpitations, dysuria, hematuria, pelvic pain, fever, chills, or flank pain. Here UA positive, recent CT remarkable for b/l renal stranding L > R with L renal calculi up to 1.2 cm, 4 mm R renal calculus, + L ureteral stent, was given IV meropenem.     1. sepsis with ESBL KLPN. complicated cystitis. pyelonephritis. renal calculi s/p ureteral stent  - urology follow up  - urine cx/blood cx results reviewed from 5/24  - agree with IV meropenem 1gmq8h  - f/u repeat blood cx   - will require midline and IV invanz 1gm daily for 14 day course  - monitor temps  - tolerating abx well so far; no side effects noted  - reason for abx use and side effects reviewed with patient  - supportive care  - fu cbc    2. other issues - care per medicine  80M w/ CAD, HTN, HLD, T2DM, BPH, kidney stones s/p L ureteral stent and stone tx 5/20 presents to the ED for positive blood and urine cultures on 5/28.  Patient was recently admitted for UTI and was discharged from [facility] on 5/26 with PO Ceftin.  Blood and urine cultures from 5/24 growing Klebsiella Pneumonia ESBL. Patient was contacted and was told to come back to get IV Abx.  Patient had an appointment with his  and he mentions his urinary urgency resolved, but he's still having frequency and has generalized weakness. Patient denies any SOB, palpitations, dysuria, hematuria, pelvic pain, fever, chills, or flank pain. Here UA positive, recent CT remarkable for b/l renal stranding L > R with L renal calculi up to 1.2 cm, 4 mm R renal calculus, + L ureteral stent, was given IV meropenem.     1. sepsis with ESBL KLPN. complicated cystitis. pyelonephritis. renal calculi s/p ureteral stent  - urine cx/blood cx results reviewed from 5/24  - agree with IV meropenem 1gmq8h  - f/u repeat blood cx   - will require midline and IV invanz 1gm daily for 14 day course  - monitor temps  - tolerating abx well so far; no side effects noted  - reason for abx use and side effects reviewed with patient  - urology follow up  - supportive care  - fu cbc    2. other issues - care per medicine

## 2021-05-29 NOTE — PROGRESS NOTE ADULT - ASSESSMENT
80M w/ CAD, HTN, HLD, T2DM, BPH, kidney stones s/p L ureteral stent and stone tx 5/20 presents to the ED for positive blood and urine cultures on 5/28.  Patient was recently admitted for UTI and was discharged from [facility] on 5/26 with PO Ceftin.  Blood and urine cultures from 5/24 growing Klebsiella Pneumonia ESBL. Patient was contacted and was told to come back to get IV Abx.  Patient had an appointment with his  and he mentions his urinary urgency resolved, but he's still having frequency and has generalized weakness. Patient denies any SOB, palpitations, dysuria, hematuria, pelvic pain, fever, chills, or flank pain. Here UA positive, recent CT remarkable for b/l renal stranding L > R with L renal calculi up to 1.2 cm, 4 mm R renal calculus, + L ureteral stent, was given IV meropenem.     #sepsis with ESBL KLPN. complicated cystitis. pyelonephritis. renal calculi s/p ureteral stent  -c/w IV meropenem 1gmq8h  - f/u repeat blood cx   - will require midline and IV invanz 1gm daily for 14 day course      #Anemia  -stable   -no evidence of acute bleeding   -trend     #T2DM  - hold home oral antihyperglycemics  - ISS, reevaluate in 24 hours     #CAD/HTN/HLD/BPH  - chronic, stable  - continue home meds: aspirin, atorvastatin, amlodipine, ACEI, doxazosin, finasteride     #DVT PPx  - IMPROVE Score 1   - SCDs    #GOC  - DNR/DNI    #Dispo  -discharge after placement of midline, to c/w abx for total of 14 days     *Case discussed with Dr. Palma 80M w/ CAD, HTN, HLD, T2DM, BPH, kidney stones s/p L ureteral stent and stone tx 5/20 presents to the ED for positive blood and urine cultures on 5/28.  Patient was recently admitted for UTI and was discharged from [facility] on 5/26 with PO Ceftin.  Blood and urine cultures from 5/24 growing Klebsiella Pneumonia ESBL. Patient was contacted and was told to come back to get IV Abx.  Patient had an appointment with his  and he mentions his urinary urgency resolved, but he's still having frequency and has generalized weakness. Patient denies any SOB, palpitations, dysuria, hematuria, pelvic pain, fever, chills, or flank pain. Here UA positive, recent CT remarkable for b/l renal stranding L > R with L renal calculi up to 1.2 cm, 4 mm R renal calculus, + L ureteral stent, was given IV meropenem.     #sepsis with ESBL KLPN. Complicated cystitis. pyelonephritis. renal calculi s/p ureteral stent  -c/w IV meropenem 1gmq8h  -f/u repeat blood cx   -will require midline and IV invanz 1gm daily for 14 day course  -ID consult and recommendations noted       #Anemia  -stable   -no evidence of acute bleeding   -trend     #T2DM  - hold home oral antihyperglycemics  - ISS, reevaluate in 24 hours     #CAD/HTN/HLD/BPH  - chronic, stable  - continue home meds: aspirin, atorvastatin, amlodipine, ACEI, doxazosin, finasteride     #DVT PPx  - IMPROVE Score 1   - SCDs    #GOC  - DNR/DNI    #Dispo  -discharge after placement of midline, to c/w abx for total of 14 days     *Case discussed with Dr. Palma 80M w/ CAD, HTN, HLD, T2DM, BPH, kidney stones s/p L ureteral stent and stone tx 5/20 presents to the ED for positive blood and urine cultures on 5/28.  Patient was recently admitted for UTI and was discharged from [facility] on 5/26 with PO Ceftin.  Blood and urine cultures from 5/24 growing Klebsiella Pneumonia ESBL. Patient was contacted and was told to come back to get IV Abx.  Patient had an appointment with his  and he mentions his urinary urgency resolved, but he's still having frequency and has generalized weakness. Patient denies any SOB, palpitations, dysuria, hematuria, pelvic pain, fever, chills, or flank pain. Here UA positive, recent CT remarkable for b/l renal stranding L > R with L renal calculi up to 1.2 cm, 4 mm R renal calculus, + L ureteral stent, was given IV meropenem.     #Bacteremia adn Urinary tract infection with ESBL KLPN. Complicated cystitis. pyelonephritis. renal calculi s/p ureteral stent  -c/w IV meropenem 1gmq8h  -f/u repeat blood cx   -will require midline and IV invanz 1gm daily for 14 day course  -ID consult and recommendations noted   Although patient was septic on prior admission he does not currently meet criteria for sepsis.      #Anemia  -stable   -no evidence of acute bleeding   -trend     #T2DM  - hold home oral antihyperglycemics  - ISS, reevaluate in 24 hours     #CAD/HTN/HLD/BPH  - chronic, stable  - continue home meds: aspirin, atorvastatin, amlodipine, ACEI, doxazosin, finasteride     #DVT PPx  - IMPROVE Score 1   - SCDs    #GOC  - DNR/DNI    #Dispo  -discharge after placement of midline, to c/w abx for total of 14 days     *Case discussed with Dr. Palma

## 2021-05-29 NOTE — CONSULT NOTE ADULT - SUBJECTIVE AND OBJECTIVE BOX
Patient is a 80y old  Male who presents with a chief complaint of UTI, +BCx and +UCx (28 May 2021 18:08)    HPI:  80M w/ CAD, HTN, HLD, T2DM, BPH, kidney stones s/p L ureteral stent and stone tx  presents to the ED for positive blood and urine cultures on .  Patient was recently admitted for UTI and was discharged from [facility] on  with PO Ceftin.  Blood and urine cultures from  growing Klebsiella Pneumonia ESBL. Patient was contacted and was told to come back to get IV Abx.  Patient had an appointment with his  and he mentions his urinary urgency resolved, but he's still having frequency and has generalized weakness. Patient denies any SOB, palpitations, dysuria, hematuria, pelvic pain, fever, chills, or flank pain. Here UA positive, recent CT remarkable for b/l renal stranding L > R with L renal calculi up to 1.2 cm, 4 mm R renal calculus, + L ureteral stent, was given IV meropenem.       PMH: as above  PSH: as above  Meds: per reconciliation sheet, noted below  MEDICATIONS  (STANDING):  amLODIPine   Tablet 5 milliGRAM(s) Oral daily  aspirin enteric coated 81 milliGRAM(s) Oral daily  atorvastatin 40 milliGRAM(s) Oral at bedtime  dextrose 40% Gel 15 Gram(s) Oral once  dextrose 5%. 1000 milliLiter(s) (50 mL/Hr) IV Continuous <Continuous>  dextrose 5%. 1000 milliLiter(s) (100 mL/Hr) IV Continuous <Continuous>  dextrose 50% Injectable 25 Gram(s) IV Push once  dextrose 50% Injectable 12.5 Gram(s) IV Push once  dextrose 50% Injectable 25 Gram(s) IV Push once  doxazosin Oral Tab/Cap - Peds 4 milliGRAM(s) Oral daily  finasteride 5 milliGRAM(s) Oral daily  glucagon  Injectable 1 milliGRAM(s) IntraMuscular once  hydrochlorothiazide 12.5 milliGRAM(s) Oral daily  hydrocortisone 1% Cream 1 Application(s) Topical three times a day  insulin lispro (ADMELOG) corrective regimen sliding scale   SubCutaneous three times a day before meals  insulin lispro (ADMELOG) corrective regimen sliding scale   SubCutaneous at bedtime  lisinopril 10 milliGRAM(s) Oral daily  meropenem  IVPB 1000 milliGRAM(s) IV Intermittent every 8 hours  metoprolol succinate  milliGRAM(s) Oral daily    Allergies    No Known Allergies    Intolerances      Social: no smoking, no alcohol, no illegal drugs; no recent travel, no exposure to TB  FAMILY HISTORY:  FH: bladder cancer (Father)       no history of premature cardiovascular disease in first degree relatives    ROS: the patient denies fever, no chills, no HA, no dizziness, no sore throat, no blurry vision, no CP, no palpitations, no SOB, no cough, no diarrhea, no N/V, no leg pain, no claudication, no rash, no joint aches, no rectal pain or bleeding, no night sweats    All other systems reviewed and are negative    Vital Signs Last 24 Hrs  T(C): 36.6 (29 May 2021 07:23), Max: 37.1 (28 May 2021 21:04)  T(F): 97.8 (29 May 2021 07:23), Max: 98.8 (28 May 2021 21:04)  HR: 80 (29 May 2021 07:23) (74 - 91)  BP: 139/85 (29 May 2021 07:23) (118/75 - 139/85)  BP(mean): 87 (28 May 2021 21:04) (87 - 89)  RR: 18 (29 May 2021 07:23) (17 - 18)  SpO2: 98% (29 May 2021 07:23) (97% - 99%)  Daily Height in cm: 182.88 (28 May 2021 14:50)    Daily     PE:  Constitutional: NAD  HEENT: NC/AT, EOMI, PERRLA, conjunctivae clear; ears and nose atraumatic; pharynx benign  Neck: supple; thyroid not palpable  Back: no tenderness  Respiratory: respiratory effort normal; clear to auscultation  Cardiovascular: S1S2 regular, no murmurs  Abdomen: soft, not tender, not distended, positive BS; liver and spleen WNL  Genitourinary: no suprapubic tenderness  Lymphatic: no LN palpable  Musculoskeletal: no muscle tenderness, no joint swelling or tenderness  Extremities: no pedal edema  Neurological/ Psychiatric: AxOx3, Judgement and insight normal;  moving all extremities  Skin: no rashes; no palpable lesions    Labs: all available labs reviewed                        11.8   6.40  )-----------( 241      ( 29 May 2021 07:58 )             37.3         138  |  103  |  23  ----------------------------<  212<H>  4.3   |  29  |  1.17    Ca    9.9      29 May 2021 07:58  Phos  3.0       Mg     2.0         TPro  7.4  /  Alb  2.7<L>  /  TBili  0.4  /  DBili  x   /  AST  36  /  ALT  105<H>  /  AlkPhos  85       LIVER FUNCTIONS - ( 29 May 2021 07:58 )  Alb: 2.7 g/dL / Pro: 7.4 gm/dL / ALK PHOS: 85 U/L / ALT: 105 U/L / AST: 36 U/L / GGT: x           Urinalysis Basic - ( 28 May 2021 15:37 )    Color: Yellow / Appearance: very cloudy / S.010 / pH: x  Gluc: x / Ketone: Trace  / Bili: Negative / Urobili: Negative mg/dL   Blood: x / Protein: 100 mg/dL / Nitrite: Positive   Leuk Esterase: Moderate / RBC: 25-50 /HPF / WBC >50   Sq Epi: x / Non Sq Epi: Occasional / Bacteria: Occasional    Culture - Urine (21 @ 18:03)   - Nitrofurantoin: S <=32 Should not be used to treat pyelonephritis   - Piperacillin/Tazobactam: S <=8   - Gentamicin: S <=2   - Imipenem: S <=1   - Amikacin: S <=16   - Levofloxacin: R 4   - Meropenem: S <=1   - Tigecycline: S <=2   - Tobramycin: S <=2   - Trimethoprim/Sulfamethoxazole: R >2/38   - Amoxicillin/Clavulanic Acid: S <=8/4   - Ampicillin: R >16 These ampicillin results predict results for amoxicillin   - Ampicillin/Sulbactam: R >16/8 Enterobacter, Citrobacter, and Serratia may develop resistance during prolonged therapy (3-4 days)   - Aztreonam: R >16   - Cefazolin: R >16 (MIC_CL_COM_ENTERIC_CEFAZU) For uncomplicated UTI with K. pneumoniae, E. coli, or P. mirablis: JARED <=16 is sensitive and JARED >=32 is resistant. This also predicts results for oral agents cefaclor, cefdinir, cefpodoxime, cefprozil, cefuroxime axetil, cephalexin and locarbef for uncomplicated UTI. Note that some isolates may be susceptible to these agents while testing resistant to cefazolin.   - Cefepime: R >16   - Cefoxitin: I 16   - Ceftriaxone: R >32 Enterobacter, Citrobacter, and Serratia may develop resistance during prolonged therapy   - Ciprofloxacin: R 2   - Ertapenem: S <=0.5   Specimen Source: .Urine None   Culture Results:   >100,000 CFU/ml Klebsiella pneumoniae ESBL   Organism Identification: Klebsiella pneumoniae ESBL   Organism: Klebsiella pneumoniae ESBL   Method Type: JARED Culture - Blood (21 @ 18:03)   - Tobramycin: S <=2   - Trimethoprim/Sulfamethoxazole: R >2/38   - Meropenem: S <=1   - Amikacin: S <=16   - Imipenem: S <=1   - Levofloxacin: R 4   - ESBL: Detec   - Klebsiella pneumoniae: Detec   - Piperacillin/Tazobactam: R <=8   Gram Stain:   Growth in aerobic bottle: Gram Negative Rods   - Ampicillin: R >16 These ampicillin results predict results for amoxicillin   - Ampicillin/Sulbactam: R >16/8 Enterobacter, Citrobacter, and Serratia may develop resistance during prolonged therapy (3-4 days)   - Ertapenem: S <=0.5   - Gentamicin: S <=2   - Ceftriaxone: R >32 Enterobacter, Citrobacter, and Serratia may develop resistance during prolonged therapy   - Ciprofloxacin: R 2   - Cefepime: R >16   - Cefoxitin: I 16   - Aztreonam: R >16   - Cefazolin: R >16 Enterobacter, Citrobacter, and Serratia may develop resistance during prolonged therapy (3-4 days)   Specimen Source: .Blood None   Organism: Blood Culture PCR   Organism: Klebsiella pneumoniae ESBL   Culture Results:   Growth in aerobic bottle: Klebsiella pneumoniae ESBL       Radiology: all available radiological tests reviewed  < from: Xray Chest 1 View-PORTABLE IMMEDIATE (21 @ 16:03) >    EXAM:  XR CHEST PORTABLE IMMED 1V                            PROCEDURE DATE:  2021          INTERPRETATION:  AP erect chest on May 28, 2021 at 4:01 PM. Patient has sepsis.    Heart magnified by technique.    The lung fields and pleural surfaces are unremarkable.    Chest is similar to 2016.    IMPRESSION: No acute finding or change.    < end of copied text >  < from: CT Abdomen and Pelvis w/ IV Cont (21 @ 20:04) >    EXAM:  CT ABDOMEN AND PELVIS IC                            PROCEDURE DATE:  2021          INTERPRETATION:  CLINICAL INFORMATION: History of removal of left renal calculi.    COMPARISON: 2018    CONTRAST/COMPLICATIONS:  IV Contrast: Omnipaque 350  90 cc administered   10 cc discarded  Oral Contrast: NONE  Complications: None reported at time of study completion    PROCEDURE:  CT of the Abdomen and Pelvis was performed.  Sagittal and coronal reformats were performed.    FINDINGS:  LOWER CHEST: No significant pleural or parenchymal abnormalities.    LIVER: Hepatic steatosis. Less than 5 mm hypodense focus hepatic dome region.  BILE DUCTS: Normal caliber.  GALLBLADDER: Gallstone. No gallbladder wall thickening. No pericholecystic fluid.  SPLEEN: Within normal limits.  PANCREAS: Within normal limits.  ADRENALS: Within normal limits.  KIDNEYS/URETERS: Nonspecific stranding of the bilateral perirenal fat identified with perirenal fluid, left greater than right. There are multiple left renal calculi noted within the mid and lower pole aspects measuring up to 1.2 cm. A 4 mm right renal calculus is noted within the lower pole region. Cystic foci noted within the left kidney measuring up to 2.1 cm. Note is made of an indwelling leftureteral stent. No calculi are noted along the course of the left ureteral stent.    BLADDER: No bladder calculi identified. Not distended, precluding assessment for bladder wall thickening.  REPRODUCTIVE ORGANS: The prostate is enlarged.    BOWEL: Fecal material scattered throughout the colon. No evidence for mechanical bowel obstruction. Colonic diverticulosis. No evidence for colonic wall thickening. No evidence for acute appendicitis.  PERITONEUM: No free intraperitoneal air or fluid.  VESSELS: Calcified left renal artery aneurysms identified.  RETROPERITONEUM/LYMPH NODES: No lymphadenopathy.  ABDOMINAL WALL: Fat containing right inguinal hernia.  BONES: Degenerative changes. Status post lumbar spine fusion surgery L2-L5.    IMPRESSION:  Nonspecific stranding of the bilateral perirenal fat identified with perirenal fluid, left greater than right. There are multiple left renal calculi noted within the mid and lower pole aspects measuring up to 1.2 cm. A 4 mm right renal calculus is noted within the lower pole region.  Note is made of an indwelling left ureteral stent. No calculi are noted along the course of the left ureteral stent.    Advanced directives addressed: full resuscitation

## 2021-05-30 PROCEDURE — 99232 SBSQ HOSP IP/OBS MODERATE 35: CPT | Mod: GC

## 2021-05-30 RX ORDER — INSULIN GLARGINE 100 [IU]/ML
25 INJECTION, SOLUTION SUBCUTANEOUS AT BEDTIME
Refills: 0 | Status: DISCONTINUED | OUTPATIENT
Start: 2021-05-30 | End: 2021-05-31

## 2021-05-30 RX ORDER — INSULIN GLARGINE 100 [IU]/ML
5 INJECTION, SOLUTION SUBCUTANEOUS ONCE
Refills: 0 | Status: COMPLETED | OUTPATIENT
Start: 2021-05-30 | End: 2021-05-30

## 2021-05-30 RX ADMIN — Medication 2: at 21:03

## 2021-05-30 RX ADMIN — Medication 1 APPLICATION(S): at 13:53

## 2021-05-30 RX ADMIN — INSULIN GLARGINE 5 UNIT(S): 100 INJECTION, SOLUTION SUBCUTANEOUS at 13:51

## 2021-05-30 RX ADMIN — AMLODIPINE BESYLATE 5 MILLIGRAM(S): 2.5 TABLET ORAL at 09:39

## 2021-05-30 RX ADMIN — INSULIN GLARGINE 25 UNIT(S): 100 INJECTION, SOLUTION SUBCUTANEOUS at 21:03

## 2021-05-30 RX ADMIN — MEROPENEM 100 MILLIGRAM(S): 1 INJECTION INTRAVENOUS at 21:05

## 2021-05-30 RX ADMIN — Medication 8: at 12:05

## 2021-05-30 RX ADMIN — ATORVASTATIN CALCIUM 40 MILLIGRAM(S): 80 TABLET, FILM COATED ORAL at 21:05

## 2021-05-30 RX ADMIN — Medication 200 MILLIGRAM(S): at 09:38

## 2021-05-30 RX ADMIN — MEROPENEM 100 MILLIGRAM(S): 1 INJECTION INTRAVENOUS at 05:15

## 2021-05-30 RX ADMIN — Medication 4 MILLIGRAM(S): at 09:38

## 2021-05-30 RX ADMIN — TRAMADOL HYDROCHLORIDE 50 MILLIGRAM(S): 50 TABLET ORAL at 08:17

## 2021-05-30 RX ADMIN — MEROPENEM 100 MILLIGRAM(S): 1 INJECTION INTRAVENOUS at 13:52

## 2021-05-30 RX ADMIN — Medication 1 APPLICATION(S): at 05:15

## 2021-05-30 RX ADMIN — Medication 6: at 08:10

## 2021-05-30 RX ADMIN — Medication 1 APPLICATION(S): at 21:05

## 2021-05-30 RX ADMIN — FINASTERIDE 5 MILLIGRAM(S): 5 TABLET, FILM COATED ORAL at 09:38

## 2021-05-30 RX ADMIN — Medication 81 MILLIGRAM(S): at 09:39

## 2021-05-30 RX ADMIN — LISINOPRIL 10 MILLIGRAM(S): 2.5 TABLET ORAL at 09:42

## 2021-05-30 RX ADMIN — Medication 12.5 MILLIGRAM(S): at 09:38

## 2021-05-30 RX ADMIN — Medication 2: at 16:42

## 2021-05-30 NOTE — CDI QUERY NOTE - NSCDIOTHERTXTBX_GEN_ALL_CORE_HH
Please document the relationship between the following conditions:  A) Sepsis is due to UTI and associated with previous surgery - ureteral stent  B)  Sepsis is due to UTI and not associated with previous surgery - ureteral stent  C)  Unable to determine  D) Not clinically significant  E) Other ( Please specify)     CHART DOCUMENTATION:    Infectious disease progress note on 5/29/2021:  80M w/ CAD, HTN, HLD, T2DM, BPH, kidney stones s/p L ureteral stent and stone tx 5/20 presents to the ED for positive blood and urine cultures on 5/28.  Patient was recently admitted for UTI and was discharged from [facility] on 5/26 with PO Ceftin.  Blood and urine cultures from 5/24 growing Klebsiella Pneumonia ESBL. Patient was contacted and was told to come back to get IV Abx.  Patient had an appointment with his  and he mentions his urinary urgency resolved, but he's still having frequency and has generalized weakness. Patient denies any SOB, palpitations, dysuria, hematuria, pelvic pain, fever, chills, or flank pain. Here UA positive, recent CT remarkable for b/l renal stranding L > R with L renal calculi up to 1.2 cm, 4 mm R renal calculus, + L ureteral stent, was given IV meropenem.     sepsis with ESBL KLPN. complicated cystitis. pyelonephritis. renal calculi s/p ureteral stent  - urine cx/blood cx results reviewed from 5/24  - agree with IV meropenem 1gmq8h  - f/u repeat blood cx   - will require midline and IV invanz 1gm daily for 14 day course    Family medicine progress note 5/29/2021:  #sepsis with ESBL KLPN. Complicated cystitis. pyelonephritis. renal calculi s/p ureteral stent  -c/w IV meropenem 1gmq8h  -f/u repeat blood cx   -will require midline and IV invanz 1gm daily for 14 day course  -ID consult and recommendations noted

## 2021-05-30 NOTE — PROGRESS NOTE ADULT - ASSESSMENT
80M w/ CAD, HTN, HLD, T2DM, BPH, kidney stones s/p L ureteral stent and stone tx 5/20 presents to the ED for positive blood and urine cultures on 5/28.  Patient was recently admitted for UTI and was discharged from [facility] on 5/26 with PO Ceftin.  Blood and urine cultures from 5/24 growing Klebsiella Pneumonia ESBL. Patient was contacted and was told to come back to get IV Abx.  Patient had an appointment with his  and he mentions his urinary urgency resolved, but he's still having frequency and has generalized weakness. Patient denies any SOB, palpitations, dysuria, hematuria, pelvic pain, fever, chills, or flank pain. Here UA positive, recent CT remarkable for b/l renal stranding L > R with L renal calculi up to 1.2 cm, 4 mm R renal calculus, + L ureteral stent, was given IV meropenem.     #Bacteremia and Urinary tract infection with ESBL KLPN. Complicated cystitis. pyelonephritis. renal calculi s/p ureteral stent  -c/w IV meropenem 1gmq8h day #2  -f/u repeat blood cx   -ID consult and recommendations noted - will require midline and IV invanz 1gm daily for 14 day course - currently pending midline, due to holiday weekend will likely be placed tomorrow or on Tuesday    #Anemia  -stable   -no evidence of acute bleeding   -trend H/H    #T2DM  - hold home oral antihyperglycemics  - ISS, reevaluate in 24 hours   - On lantus 20 U bedtime- dose increased to 25U Bedtime due to increasing fingerstick BG; may require higher insulin dose pending further FS    #CAD/HTN/HLD/BPH  - chronic, stable  - continue home meds: aspirin, atorvastatin, amlodipine, ACEI, doxazosin, finasteride     #DVT PPx  - IMPROVE Score 1   - SCDs    #GOC  - DNR/DNI    #Dispo  -discharge after placement of midline, to c/w abx (Ertapenem) for total of 14 days   - SW able to find infusion center to provide abx, however awaiting nursing care to be set up

## 2021-05-30 NOTE — PROGRESS NOTE ADULT - ATTENDING COMMENTS
Family Medicine Attending Addendum    PATRICE GOMEZ is a 80year old patient seen on rounds, interviewed and examined with Dr. Disla. Medical Record reviewed. History, review of systems, physical findings and lab results as documented confirmed, except as modified by me. Agree with management plan as described except as modified below.    ESBL Klebsiella  + blood and urine cultures  Meropenem  Midline  Eratpenem 1 gm daily.
Patient is a 80y old  Male who presents with a chief complaint of UTI, +BCx and +UCx (29 May 2021 12:20)      MEDICATIONS  (STANDING):  amLODIPine   Tablet 5 milliGRAM(s) Oral daily  aspirin enteric coated 81 milliGRAM(s) Oral daily  atorvastatin 40 milliGRAM(s) Oral at bedtime  dextrose 40% Gel 15 Gram(s) Oral once  dextrose 5%. 1000 milliLiter(s) (50 mL/Hr) IV Continuous <Continuous>  dextrose 5%. 1000 milliLiter(s) (100 mL/Hr) IV Continuous <Continuous>  dextrose 50% Injectable 25 Gram(s) IV Push once  dextrose 50% Injectable 12.5 Gram(s) IV Push once  dextrose 50% Injectable 25 Gram(s) IV Push once  doxazosin Oral Tab/Cap - Peds 4 milliGRAM(s) Oral daily  finasteride 5 milliGRAM(s) Oral daily  glucagon  Injectable 1 milliGRAM(s) IntraMuscular once  hydrochlorothiazide 12.5 milliGRAM(s) Oral daily  hydrocortisone 1% Cream 1 Application(s) Topical three times a day  insulin lispro (ADMELOG) corrective regimen sliding scale   SubCutaneous three times a day before meals  insulin lispro (ADMELOG) corrective regimen sliding scale   SubCutaneous at bedtime  lisinopril 10 milliGRAM(s) Oral daily  meropenem  IVPB 1000 milliGRAM(s) IV Intermittent every 8 hours  metoprolol succinate  milliGRAM(s) Oral daily    MEDICATIONS  (PRN):  ALBUTerol    90 MICROgram(s) HFA Inhaler 2 Puff(s) Inhalation every 6 hours PRN for shortness of breath and/or wheezing  traMADol 50 milliGRAM(s) Oral every 6 hours PRN for moderate pain      CAPILLARY BLOOD GLUCOSE      POCT Blood Glucose.: 271 mg/dL (29 May 2021 12:02)  POCT Blood Glucose.: 220 mg/dL (29 May 2021 08:16)  POCT Blood Glucose.: 216 mg/dL (28 May 2021 23:43)  POCT Blood Glucose.: 234 mg/dL (28 May 2021 20:45)      PHYSICAL EXAM:    GENERAL: NAD, lying in bed comfortably, alert and oriented  HEAD:  Atraumatic, Normocephalic, NECK: Supple, No JVD  EYES: EOMI, PERRL, conjunctiva and sclera clear, ENT: Moist mucous membranes  CHEST/LUNG: Clear to auscultation bilaterally; No rales, rhonchi, wheezing, or rubs. Unlabored respirations  HEART: Regular rate and rhythm; No murmurs, rubs, or gallops  ABDOMEN: Bowel sounds present; Soft, Nontender, Nondistended. No hepatomegally  EXTREMITIES:  2+ Peripheral Pulses, brisk capillary refill. No clubbing, cyanosis, or edema  NERVOUS SYSTEM:  No focal deficits   MSK: FROM all 4 extremities, full and equal strength, SKIN: No rashes or lesions    LABS: Reviewed with resident as documented above    RADIOLOGY & ADDITIONAL TESTS:    Imaging Personally Reviewed:    Consultant(s) Notes Reviewed:  Infectious Disease    Care Discussed with Consultants/Other Providers:      Patient was seen on rounds, interviewed and examined with Dr. Brown. Medical Record reviewed. History, review of systems, physical findings and lab results as documented confirmed, except as modified by me. Agree with management plan as described except as modified below.    PATRICE GOMEZ is a 80y old  Male with a chief complaint of UTI, +BCx and +UCx (29 May 2021 12:20)    ESBL Klebsiella  + blood and urine cultures  Meropenem  Midline  Eratpenem 1 gm daily.

## 2021-05-30 NOTE — PROGRESS NOTE ADULT - ASSESSMENT
80M w/ CAD, HTN, HLD, T2DM, BPH, kidney stones s/p L ureteral stent and stone tx 5/20 presents to the ED for positive blood and urine cultures on 5/28.  Patient was recently admitted for UTI and was discharged from [facility] on 5/26 with PO Ceftin.  Blood and urine cultures from 5/24 growing Klebsiella Pneumonia ESBL. Patient was contacted and was told to come back to get IV Abx.  Patient had an appointment with his  and he mentions his urinary urgency resolved, but he's still having frequency and has generalized weakness. Patient denies any SOB, palpitations, dysuria, hematuria, pelvic pain, fever, chills, or flank pain. Here UA positive, recent CT remarkable for b/l renal stranding L > R with L renal calculi up to 1.2 cm, 4 mm R renal calculus, + L ureteral stent, was given IV meropenem.     1. sepsis with ESBL KLPN. complicated cystitis. pyelonephritis. renal calculi s/p ureteral stent  - urine cx/blood cx results reviewed from 5/24  - on IV meropenem 1gmq8h #2  - repeat blood cx no growth 5/28   - will require midline and IV invanz 1gm daily for 14 day course until 6/10   - monitor temps  - tolerating abx well so far; no side effects noted  - reason for abx use and side effects reviewed with patient  - urology follow up  - supportive care  - fu cbc    2. other issues - care per medicine

## 2021-05-31 VITALS
TEMPERATURE: 98 F | RESPIRATION RATE: 17 BRPM | HEART RATE: 71 BPM | SYSTOLIC BLOOD PRESSURE: 117 MMHG | OXYGEN SATURATION: 97 % | DIASTOLIC BLOOD PRESSURE: 74 MMHG

## 2021-05-31 PROCEDURE — 99239 HOSP IP/OBS DSCHRG MGMT >30: CPT

## 2021-05-31 RX ORDER — ERTAPENEM SODIUM 1 G/1
1000 INJECTION, POWDER, LYOPHILIZED, FOR SOLUTION INTRAMUSCULAR; INTRAVENOUS EVERY 24 HOURS
Refills: 0 | Status: DISCONTINUED | OUTPATIENT
Start: 2021-05-31 | End: 2021-05-31

## 2021-05-31 RX ORDER — INSULIN GLARGINE 100 [IU]/ML
5 INJECTION, SOLUTION SUBCUTANEOUS ONCE
Refills: 0 | Status: COMPLETED | OUTPATIENT
Start: 2021-05-31 | End: 2021-05-31

## 2021-05-31 RX ORDER — ERTAPENEM SODIUM 1 G/1
1 INJECTION, POWDER, LYOPHILIZED, FOR SOLUTION INTRAMUSCULAR; INTRAVENOUS
Qty: 0 | Refills: 0 | DISCHARGE
Start: 2021-05-31

## 2021-05-31 RX ORDER — CX-024414 0.2 MG/ML
0.5 INJECTION, SUSPENSION INTRAMUSCULAR
Qty: 0 | Refills: 0 | DISCHARGE

## 2021-05-31 RX ORDER — INSULIN GLARGINE 100 [IU]/ML
30 INJECTION, SOLUTION SUBCUTANEOUS AT BEDTIME
Refills: 0 | Status: DISCONTINUED | OUTPATIENT
Start: 2021-05-31 | End: 2021-05-31

## 2021-05-31 RX ADMIN — Medication 81 MILLIGRAM(S): at 10:02

## 2021-05-31 RX ADMIN — Medication 4: at 12:10

## 2021-05-31 RX ADMIN — Medication 200 MILLIGRAM(S): at 10:02

## 2021-05-31 RX ADMIN — LISINOPRIL 10 MILLIGRAM(S): 2.5 TABLET ORAL at 10:02

## 2021-05-31 RX ADMIN — ERTAPENEM SODIUM 120 MILLIGRAM(S): 1 INJECTION, POWDER, LYOPHILIZED, FOR SOLUTION INTRAMUSCULAR; INTRAVENOUS at 14:16

## 2021-05-31 RX ADMIN — INSULIN GLARGINE 5 UNIT(S): 100 INJECTION, SOLUTION SUBCUTANEOUS at 14:53

## 2021-05-31 RX ADMIN — Medication 12.5 MILLIGRAM(S): at 10:02

## 2021-05-31 RX ADMIN — AMLODIPINE BESYLATE 5 MILLIGRAM(S): 2.5 TABLET ORAL at 10:01

## 2021-05-31 RX ADMIN — MEROPENEM 100 MILLIGRAM(S): 1 INJECTION INTRAVENOUS at 06:10

## 2021-05-31 RX ADMIN — Medication 1 APPLICATION(S): at 14:16

## 2021-05-31 RX ADMIN — Medication 1 APPLICATION(S): at 06:15

## 2021-05-31 RX ADMIN — FINASTERIDE 5 MILLIGRAM(S): 5 TABLET, FILM COATED ORAL at 10:02

## 2021-05-31 RX ADMIN — Medication 6: at 08:30

## 2021-05-31 NOTE — DISCHARGE NOTE PROVIDER - HOSPITAL COURSE
80M w/ CAD, HTN, HLD, T2DM, BPH, kidney stones s/p L ureteral stent and stone tx 5/20 presents to the ED for positive blood and urine cultures on 5/28.  Patient was recently admitted for UTI and was discharged from [facility] on 5/26 with PO Ceftin.  Blood and urine cultures from 5/24 growing Klebsiella Pneumonia ESBL. Patient was contacted and was told to come back to get IV Abx.  Patient had an appointment with his  and he mentions his urinary urgency resolved, but he's still having frequency and has generalized weakness. Patient denies any SOB, palpitations, dysuria, hematuria, pelvic pain, fever, chills, or flank pain. Here UA positive, recent CT remarkable for b/l renal stranding L > R with L renal calculi up to 1.2 cm, 4 mm R renal calculus, + L ureteral stent, was given IV meropenem. Follow up blood culture negative. Patient to go home with midline and 14 day course of invanz IV. Patient to follow up with PCP in 1-2 weeks.     Vitals  T(F): 97.5 (05-31-21 @ 07:14), Max: 98.1 (05-30-21 @ 22:25)  HR: 70 (05-31-21 @ 07:14) (70 - 79)  BP: 112/70 (05-31-21 @ 07:14) (112/70 - 120/70)  RR: 17 (05-31-21 @ 07:14) (17 - 18)  SpO2: 94% (05-31-21 @ 07:14) (94% - 97%)    Physical Exam   Constitutional: NAD  HEENT: NC/AT, EOMI, PERRLA, conjunctivae clear; ears and nose atraumatic; pharynx benign  Neck: supple; thyroid not palpable  Back: no tenderness  Respiratory: respiratory effort normal; clear to auscultation  Cardiovascular: S1S2 regular, no murmurs  Abdomen: soft, not tender, not distended, positive BS; liver and spleen WNL  Genitourinary: no suprapubic tenderness  Lymphatic: no LN palpable  Musculoskeletal: no muscle tenderness, no joint swelling or tenderness  Extremities: no pedal edema  Neurological/ Psychiatric: AxOx3, Judgement and insight normal;  moving all extremities  Skin: no rashes; no palpable lesions       80M w/ CAD, HTN, HLD, T2DM, BPH, kidney stones s/p L ureteral stent and stone tx 5/20 presents to the ED for positive blood and urine cultures on 5/28.  Patient was recently admitted for UTI and was discharged from [facility] on 5/26 with PO Ceftin.  Blood and urine cultures from 5/24 growing Klebsiella Pneumonia ESBL. Patient was contacted and was told to come back to get IV Abx.  Patient had an appointment with his  and he mentions his urinary urgency resolved, but he's still having frequency and has generalized weakness. Patient denies any SOB, palpitations, dysuria, hematuria, pelvic pain, fever, chills, or flank pain. Here UA positive, recent CT remarkable for b/l renal stranding L > R with L renal calculi up to 1.2 cm, 4 mm R renal calculus, + L ureteral stent, was given IV meropenem. Follow up blood culture negative. Patient to go home with midline and 14 day course of invanz IV. Patient to follow up with PCP in 1-2 weeks.     Patient was seen on the day of discharge. Patient in no acute distress eager to go home. No overnight events. VSS. It was extensively discussed with patient about the need of long acting insulin for better control of his sugars. Patient refused starting insulin even after explaining the possible risks associated with elevated sugars including DKA and possible death. Patient able to show understanding via teach back method. Patient to continue home hyperglycemic oral medications and highly recommend/ suggest closely following up with PCP within 1-3 days.     Vitals  T(F): 97.5 (05-31-21 @ 07:14), Max: 98.1 (05-30-21 @ 22:25)  HR: 70 (05-31-21 @ 07:14) (70 - 79)  BP: 112/70 (05-31-21 @ 07:14) (112/70 - 120/70)  RR: 17 (05-31-21 @ 07:14) (17 - 18)  SpO2: 94% (05-31-21 @ 07:14) (94% - 97%)    Physical Exam   Constitutional: NAD  HEENT: NC/AT, EOMI, PERRLA, conjunctivae clear; ears and nose atraumatic; pharynx benign  Neck: supple; thyroid not palpable  Back: no tenderness  Respiratory: respiratory effort normal; clear to auscultation  Cardiovascular: S1S2 regular, no murmurs  Abdomen: soft, not tender, not distended, positive BS; liver and spleen WNL  Genitourinary: no suprapubic tenderness  Lymphatic: no LN palpable  Musculoskeletal: no muscle tenderness, no joint swelling or tenderness  Extremities: no pedal edema  Neurological/ Psychiatric: AxOx3, Judgement and insight normal;  moving all extremities  Skin: no rashes; no palpable lesions

## 2021-05-31 NOTE — DISCHARGE NOTE PROVIDER - NSDCMRMEDTOKEN_GEN_ALL_CORE_FT
Albuterol (Eqv-Proventil HFA) 90 mcg/inh inhalation aerosol: 2 puff(s) inhaled every 6 hours, As Needed - for shortness of breath and/or wheezing  amLODIPine 5 mg oral tablet: 1 tab(s) orally once a day (in the evening)  Aspirin Enteric Coated 81 mg oral delayed release tablet: 1 tab(s) orally once a day  atorvastatin 40 mg oral tablet: 1 tab(s) orally once a day  cefuroxime 250 mg oral tablet: 1 tab(s) orally every 12 hours  *****Course Not Completed***  doxazosin 4 mg oral tablet: 1 tab(s) orally once a day  doxycycline hyclate 100 mg oral tablet: 1 tab(s) orally 2 times a day  ******Course Completed***  finasteride 5 mg oral tablet: 1 tab(s) orally once a day  glimepiride 1 mg oral tablet: 1 tab(s) orally once a day  hydroCHLOROthiazide 12.5 mg oral capsule: 1 cap(s) orally once a day  Janumet 50 mg-1000 mg oral tablet: 1 tab(s) orally 2 times a day  metoprolol succinate 200 mg oral tablet, extended release: 1 tab(s) orally once a day (in the evening)  ramipril 10 mg oral capsule: 1 cap(s) orally once a day  traMADol 50 mg oral tablet: 1 tab(s) orally every 6 hours, As Needed - for moderate pain   Albuterol (Eqv-Proventil HFA) 90 mcg/inh inhalation aerosol: 2 puff(s) inhaled every 6 hours, As Needed - for shortness of breath and/or wheezing  amLODIPine 5 mg oral tablet: 1 tab(s) orally once a day (in the evening)  Aspirin Enteric Coated 81 mg oral delayed release tablet: 1 tab(s) orally once a day  atorvastatin 40 mg oral tablet: 1 tab(s) orally once a day  cefuroxime 250 mg oral tablet: 1 tab(s) orally every 12 hours  *****Course Not Completed***  doxazosin 4 mg oral tablet: 1 tab(s) orally once a day  doxycycline hyclate 100 mg oral tablet: 1 tab(s) orally 2 times a day  ******Course Completed***  ertapenem 1 g injection: 1 gram(s) injectable once a day  finasteride 5 mg oral tablet: 1 tab(s) orally once a day  glimepiride 1 mg oral tablet: 1 tab(s) orally once a day  hydroCHLOROthiazide 12.5 mg oral capsule: 1 cap(s) orally once a day  Janumet 50 mg-1000 mg oral tablet: 1 tab(s) orally 2 times a day  metoprolol succinate 200 mg oral tablet, extended release: 1 tab(s) orally once a day (in the evening)  ramipril 10 mg oral capsule: 1 cap(s) orally once a day  traMADol 50 mg oral tablet: 1 tab(s) orally every 6 hours, As Needed - for moderate pain   Albuterol (Eqv-Proventil HFA) 90 mcg/inh inhalation aerosol: 2 puff(s) inhaled every 6 hours, As Needed - for shortness of breath and/or wheezing  amLODIPine 5 mg oral tablet: 1 tab(s) orally once a day (in the evening)  Aspirin Enteric Coated 81 mg oral delayed release tablet: 1 tab(s) orally once a day  atorvastatin 40 mg oral tablet: 1 tab(s) orally once a day  doxazosin 4 mg oral tablet: 1 tab(s) orally once a day  ertapenem 1 g injection: 1 gram(s) injectable once a day  finasteride 5 mg oral tablet: 1 tab(s) orally once a day  glimepiride 1 mg oral tablet: 1 tab(s) orally once a day  hydroCHLOROthiazide 12.5 mg oral capsule: 1 cap(s) orally once a day  Janumet 50 mg-1000 mg oral tablet: 1 tab(s) orally 2 times a day  metoprolol succinate 200 mg oral tablet, extended release: 1 tab(s) orally once a day (in the evening)  ramipril 10 mg oral capsule: 1 cap(s) orally once a day  traMADol 50 mg oral tablet: 1 tab(s) orally every 6 hours, As Needed - for moderate pain

## 2021-05-31 NOTE — CHART NOTE - NSCHARTNOTEFT_GEN_A_CORE
I called pt's PCP, Dr. Dg Collier at [phone] to discuss hospital course, pt's elevated BG and need for close outpt follow up for repeat BG and further management of DM as pt adamant about not going home on Lantus, ISS given uncontrolled BG and especially in the setting of infection.  I reached answering service, spoke with Delmis, who was willing to relay message to  the office in the AM.  - Will also attempt to call office again in the AM

## 2021-05-31 NOTE — PROGRESS NOTE ADULT - ASSESSMENT
80M w/ CAD, HTN, HLD, T2DM, BPH, kidney stones s/p L ureteral stent and stone tx 5/20 presents to the ED for positive blood and urine cultures on 5/28.  Patient was recently admitted for UTI and was discharged from [facility] on 5/26 with PO Ceftin.  Blood and urine cultures from 5/24 growing Klebsiella Pneumonia ESBL. Patient was contacted and was told to come back to get IV Abx.  Patient had an appointment with his  and he mentions his urinary urgency resolved, but he's still having frequency and has generalized weakness. Patient denies any SOB, palpitations, dysuria, hematuria, pelvic pain, fever, chills, or flank pain. Here UA positive, recent CT remarkable for b/l renal stranding L > R with L renal calculi up to 1.2 cm, 4 mm R renal calculus, + L ureteral stent, was given IV meropenem.     1. sepsis with ESBL KLPN. complicated cystitis. pyelonephritis. renal calculi s/p ureteral stent  - urine cx/blood cx results reviewed from 5/24  - s/p IV meropenem 1gmq8h #2 - change to invanz 1gm daily  - repeat blood cx no growth 5/28   - will require midline and IV invanz 1gm daily for 14 day course until 6/10   - monitor temps  - tolerating abx well so far; no side effects noted  - reason for abx use and side effects reviewed with patient  - urology follow up  - supportive care  - fu cbc    2. other issues - care per medicine

## 2021-05-31 NOTE — DISCHARGE NOTE PROVIDER - NSDCCPCAREPLAN_GEN_ALL_CORE_FT
PRINCIPAL DISCHARGE DIAGNOSIS  Diagnosis: Urinary tract infection due to ESBL Klebsiella  Assessment and Plan of Treatment: You came to the hospital due to a positive urine culture with ESBL klebsiella after your previous admision. You were started on the approriate antibiotic that targets the current bacteria. You wll be going home with a midline and recive antibiotic daily with the assistance of nursing staff for 14 days until 6/10.   -It is important that you continue taking your antibiotics as prescribed and follow up with your PCP for further management and treatment.  -Please go to the ED or call 911 if you start experiencing severe abdominal pain, urinary incontinence, blood in your urine, fever, chills, confusion, shortness of breath or chest pain.        SECONDARY DISCHARGE DIAGNOSES  Diagnosis: Type 2 diabetes mellitus  Assessment and Plan of Treatment: Please continue all your medications as prescribed and follow up with your PCP for further management and treatment.       PRINCIPAL DISCHARGE DIAGNOSIS  Diagnosis: Urinary tract infection due to ESBL Klebsiella  Assessment and Plan of Treatment: You came to the hospital due to a positive urine culture with ESBL klebsiella after your previous admision. You were started on the approriate antibiotic that targets the current bacteria. You wll be going home with a midline and recive antibiotic daily with the assistance of nursing staff for 14 days until 6/10.   -It is important that you continue taking your antibiotics as prescribed and follow up with your PCP for further management and treatment.  -Please go to the ED or call 911 if you start experiencing severe abdominal pain, urinary incontinence, blood in your urine, fever, chills, confusion, shortness of breath or chest pain.        SECONDARY DISCHARGE DIAGNOSES  Diagnosis: Type 2 diabetes mellitus  Assessment and Plan of Treatment: During your admission your blood glucose levels were elevated (250-300) while on long acting insuling and corrective sliding scale. Your A1C was 7.9.  Please continue your oral glucose medications. Please follow up with your PCP as soon as possible within 1week  for glucose check and adjustment of treatment.         PRINCIPAL DISCHARGE DIAGNOSIS  Diagnosis: Urinary tract infection due to ESBL Klebsiella  Assessment and Plan of Treatment: You came to the hospital due to a positive urine culture with ESBL klebsiella after your previous admision. You were started on the approriate antibiotic that targets the current bacteria. You wll be going home with a midline and recive antibiotic daily with the assistance of nursing staff for 14 days until 6/10.   -It is important that you continue taking your antibiotics as prescribed and follow up with your PCP for further management and treatment.  -Please go to the ED or call 911 if you start experiencing severe abdominal pain, urinary incontinence, blood in your urine, fever, chills, confusion, shortness of breath or chest pain.        SECONDARY DISCHARGE DIAGNOSES  Diagnosis: Type 2 diabetes mellitus  Assessment and Plan of Treatment: During your admission your blood glucose levels were elevated (250-300) while on long acting insuling and corrective sliding scale. Your A1C was 7.9.  Please continue your oral glucose medications. Please follow up with your PCP as soon as possible within 1-3 days  for glucose check and adjustment of treatment. including possibly starting insulin.

## 2021-05-31 NOTE — DISCHARGE NOTE NURSING/CASE MANAGEMENT/SOCIAL WORK - PATIENT PORTAL LINK FT
You can access the FollowMyHealth Patient Portal offered by [facility] by registering at the following website: [email or web address]. By joining [employer]’s FollowMyHealth portal, you will also be able to view your health information using other applications (apps) compatible with our system.

## 2021-05-31 NOTE — PROGRESS NOTE ADULT - SUBJECTIVE AND OBJECTIVE BOX
HPI: 80M w/ CAD, HTN, HLD, T2DM, BPH, kidney stones w/ recent surgical removal on 5/20 presents to the ED for positive blood and urine cultures.  Patient was recently admitted for UTI and was discharged from [facility] on 5/26 with PO Ceftin.  Blood and urine cultures resolved today and showed to be Klebsiella Pneumonia ESBL.  Patient was contacted and was told to come back to get IV Abx.   Patient had an appointment with his PCP today and he mentions his urinary urgency resolved, but he's still having frequency and has generalized weakness.  Patient denies any SOB, palpitations, dysuria, hematuria, pelvic pain, fever, chills, or flank pain.    SUBJECTIVE: Pt seen and evaluated at bedside. No acute events overnight. Vitals stable. Afebrile. Tolerating abx well.     REVIEW OF SYSTEMS:  CONSTITUTIONAL: No weakness, fevers or chills  EYES/ENT: No visual changes;  No vertigo or throat pain   NECK: No pain or stiffness  RESPIRATORY: No cough, wheezing, hemoptysis; No shortness of breath  CARDIOVASCULAR: No chest pain or palpitations  GASTROINTESTINAL: No abdominal or epigastric pain. No nausea, vomiting, or hematemesis; No diarrhea or constipation. No melena or hematochezia.  GENITOURINARY: No dysuria, +frequency, +hematuria  NEUROLOGICAL: No numbness or weakness  SKIN: No itching, burning, rashes, or lesions   All other review of systems is negative unless indicated above    Vital Signs Last 24 Hrs  T(C): 36.6 (29 May 2021 07:23), Max: 37.1 (28 May 2021 21:04)  T(F): 97.8 (29 May 2021 07:23), Max: 98.8 (28 May 2021 21:04)  HR: 80 (29 May 2021 07:23) (74 - 91)  BP: 139/85 (29 May 2021 07:23) (118/75 - 139/85)  BP(mean): 87 (28 May 2021 21:04) (87 - 89)  RR: 18 (29 May 2021 07:23) (17 - 18)  SpO2: 98% (29 May 2021 07:23) (97% - 99%)    I&O's Summary      CAPILLARY BLOOD GLUCOSE      POCT Blood Glucose.: 271 mg/dL (29 May 2021 12:02)  POCT Blood Glucose.: 220 mg/dL (29 May 2021 08:16)  POCT Blood Glucose.: 216 mg/dL (28 May 2021 23:43)  POCT Blood Glucose.: 234 mg/dL (28 May 2021 20:45)      PHYSICAL EXAM:  Constitutional: NAD, awake and alert, well-developed  HEENT: PERR, EOMI, Normal Hearing, MMM  Neck: Soft and supple, No LAD, No JVD  Respiratory: Breath sounds are clear bilaterally, No wheezing, rales or rhonchi  Cardiovascular: S1 and S2, regular rate and rhythm, no Murmurs, gallops or rubs  Gastrointestinal: Bowel Sounds present, soft, nontender, nondistended, no guarding, no rebound  Extremities: No peripheral edema  Vascular: 2+ peripheral pulses  Neurological: A/O x 3, no focal deficits  Musculoskeletal: 5/5 strength b/l upper and lower extremities  Skin: No rashes    MEDICATIONS:  MEDICATIONS  (STANDING):  amLODIPine   Tablet 5 milliGRAM(s) Oral daily  aspirin enteric coated 81 milliGRAM(s) Oral daily  atorvastatin 40 milliGRAM(s) Oral at bedtime  dextrose 40% Gel 15 Gram(s) Oral once  dextrose 5%. 1000 milliLiter(s) (50 mL/Hr) IV Continuous <Continuous>  dextrose 5%. 1000 milliLiter(s) (100 mL/Hr) IV Continuous <Continuous>  dextrose 50% Injectable 25 Gram(s) IV Push once  dextrose 50% Injectable 12.5 Gram(s) IV Push once  dextrose 50% Injectable 25 Gram(s) IV Push once  doxazosin Oral Tab/Cap - Peds 4 milliGRAM(s) Oral daily  finasteride 5 milliGRAM(s) Oral daily  glucagon  Injectable 1 milliGRAM(s) IntraMuscular once  hydrochlorothiazide 12.5 milliGRAM(s) Oral daily  hydrocortisone 1% Cream 1 Application(s) Topical three times a day  insulin lispro (ADMELOG) corrective regimen sliding scale   SubCutaneous three times a day before meals  insulin lispro (ADMELOG) corrective regimen sliding scale   SubCutaneous at bedtime  lisinopril 10 milliGRAM(s) Oral daily  meropenem  IVPB 1000 milliGRAM(s) IV Intermittent every 8 hours  metoprolol succinate  milliGRAM(s) Oral daily      LABS: All Labs Reviewed:                        11.8   6.40  )-----------( 241      ( 29 May 2021 07:58 )             37.3     05-29    138  |  103  |  23  ----------------------------<  212<H>  4.3   |  29  |  1.17    Ca    9.9      29 May 2021 07:58  Phos  3.0     05-29  Mg     2.0     05-29    TPro  7.4  /  Alb  2.7<L>  /  TBili  0.4  /  DBili  x   /  AST  36  /  ALT  105<H>  /  AlkPhos  85  05-29    PT/INR - ( 28 May 2021 16:34 )   PT: 11.8 sec;   INR: 1.02 ratio         PTT - ( 28 May 2021 16:34 )  PTT:26.1 sec      Blood Culture: 05-24 @ 18:03  Organism Blood Culture PCR  Gram Stain Blood -- Gram Stain   Growth in aerobic bottle: Gram Negative Rods  Specimen Source .Blood None  Culture-Blood --        RADIOLOGY/EKG:  < from: CT Abdomen and Pelvis w/ IV Cont (05.24.21 @ 20:04) >  FINDINGS:  LOWER CHEST: No significant pleural or parenchymal abnormalities.    LIVER: Hepatic steatosis. Less than 5 mm hypodense focus hepatic dome region.  BILE DUCTS: Normal caliber.  GALLBLADDER: Gallstone. No gallbladder wall thickening. No pericholecystic fluid.  SPLEEN: Within normal limits.  PANCREAS: Within normal limits.  ADRENALS: Within normal limits.  KIDNEYS/URETERS: Nonspecific stranding of the bilateral perirenal fat identified with perirenal fluid, left greater than right. There are multiple left renal calculi noted within the mid and lower pole aspects measuring up to 1.2 cm. A 4 mm right renal calculus is noted within the lower pole region. Cystic foci noted within the left kidney measuring up to 2.1 cm. Note is made of an indwelling leftureteral stent. No calculi are noted along the course of the left ureteral stent.    BLADDER: No bladder calculi identified. Not distended, precluding assessment for bladder wall thickening.  REPRODUCTIVE ORGANS: The prostate is enlarged.    BOWEL: Fecal material scattered throughout the colon. No evidence for mechanical bowel obstruction. Colonic diverticulosis. No evidence for colonic wall thickening. No evidence for acute appendicitis.  PERITONEUM: No free intraperitoneal air or fluid.  VESSELS: Calcified left renal artery aneurysms identified.  RETROPERITONEUM/LYMPH NODES: No lymphadenopathy.  ABDOMINAL WALL: Fat containing right inguinal hernia.  BONES: Degenerative changes. Status post lumbar spine fusion surgery L2-L5.    IMPRESSION:  Nonspecific stranding of the bilateral perirenal fat identified with perirenal fluid, left greater than right. There are multiple left renal calculi noted within the mid and lower pole aspects measuring up to 1.2 cm. A 4 mm right renal calculus is noted within the lower pole region.  Note is made of an indwelling left ureteral stent. No calculi are noted along the course of the left ureteral stent.    < end of copied text >    
Date of service: 21 @ 12:58    pt seen and examined  feels better  denies flank pain/abd pain  wants to go home soon    ROS: no fever or chills; denies dizziness, no HA, no SOB or cough, no abdominal pain, no diarrhea or constipation; no legs pain, no rashes    MEDICATIONS  (STANDING):  amLODIPine   Tablet 5 milliGRAM(s) Oral daily  aspirin enteric coated 81 milliGRAM(s) Oral daily  atorvastatin 40 milliGRAM(s) Oral at bedtime  dextrose 40% Gel 15 Gram(s) Oral once  dextrose 5%. 1000 milliLiter(s) (50 mL/Hr) IV Continuous <Continuous>  dextrose 5%. 1000 milliLiter(s) (100 mL/Hr) IV Continuous <Continuous>  dextrose 50% Injectable 25 Gram(s) IV Push once  dextrose 50% Injectable 12.5 Gram(s) IV Push once  dextrose 50% Injectable 25 Gram(s) IV Push once  doxazosin Oral Tab/Cap - Peds 4 milliGRAM(s) Oral daily  finasteride 5 milliGRAM(s) Oral daily  glucagon  Injectable 1 milliGRAM(s) IntraMuscular once  hydrochlorothiazide 12.5 milliGRAM(s) Oral daily  hydrocortisone 1% Cream 1 Application(s) Topical three times a day  insulin glargine Injectable (LANTUS) 25 Unit(s) SubCutaneous at bedtime  insulin lispro (ADMELOG) corrective regimen sliding scale   SubCutaneous three times a day before meals  insulin lispro (ADMELOG) corrective regimen sliding scale   SubCutaneous at bedtime  lisinopril 10 milliGRAM(s) Oral daily  meropenem  IVPB 1000 milliGRAM(s) IV Intermittent every 8 hours  metoprolol succinate  milliGRAM(s) Oral daily    Vital Signs Last 24 Hrs  T(C): 36.4 (31 May 2021 07:14), Max: 36.7 (30 May 2021 22:25)  T(F): 97.5 (31 May 2021 07:14), Max: 98.1 (30 May 2021 22:25)  HR: 70 (31 May 2021 07:14) (70 - 79)  BP: 112/70 (31 May 2021 07:14) (112/70 - 120/70)  BP(mean): --  RR: 17 (31 May 2021 07:14) (17 - 18)  SpO2: 94% (31 May 2021 07:14) (94% - 97%)    PE:  Constitutional: NAD  HEENT: NC/AT, EOMI, PERRLA, conjunctivae clear; ears and nose atraumatic; pharynx benign  Neck: supple; thyroid not palpable  Back: no tenderness  Respiratory: respiratory effort normal; clear to auscultation  Cardiovascular: S1S2 regular, no murmurs  Abdomen: soft, not tender, not distended, positive BS; liver and spleen WNL  Genitourinary: no suprapubic tenderness  Lymphatic: no LN palpable  Musculoskeletal: no muscle tenderness, no joint swelling or tenderness  Extremities: no pedal edema  Neurological/ Psychiatric: AxOx3, Judgement and insight normal;  moving all extremities  Skin: no rashes; no palpable lesions    Labs: all available labs reviewed                       Urinalysis Basic - ( 28 May 2021 15:37 )    Color: Yellow / Appearance: very cloudy / S.010 / pH: x  Gluc: x / Ketone: Trace  / Bili: Negative / Urobili: Negative mg/dL   Blood: x / Protein: 100 mg/dL / Nitrite: Positive   Leuk Esterase: Moderate / RBC: 25-50 /HPF / WBC >50   Sq Epi: x / Non Sq Epi: Occasional / Bacteria: Occasional      Culture - Blood (21 @ 16:34)   Specimen Source: .Blood None   Culture Results:   No growth to date. Culture - Blood (21 @ 16:34)   Specimen Source: .Blood None   Culture Results:   No growth to date.   Culture - Urine (21 @ 18:03)   - Nitrofurantoin: S <=32 Should not be used to treat pyelonephritis   - Piperacillin/Tazobactam: S <=8   - Gentamicin: S <=2   - Imipenem: S <=1   - Amikacin: S <=16   - Levofloxacin: R 4   - Meropenem: S <=1   - Tigecycline: S <=2   - Tobramycin: S <=2   - Trimethoprim/Sulfamethoxazole: R >2/38   - Amoxicillin/Clavulanic Acid: S <=8/4   - Ampicillin: R >16 These ampicillin results predict results for amoxicillin   - Ampicillin/Sulbactam: R >16/8 Enterobacter, Citrobacter, and Serratia may develop resistance during prolonged therapy (3-4 days)   - Aztreonam: R >16   - Cefazolin: R >16 (MIC_CL_COM_[id number]) For uncomplicated UTI with K. pneumoniae, E. coli, or P. mirablis: JARED <=16 is sensitive and JARED >=32 is resistant. This also predicts results for oral agents cefaclor, cefdinir, cefpodoxime, cefprozil, cefuroxime axetil, cephalexin and locarbef for uncomplicated UTI. Note that some isolates may be susceptible to these agents while testing resistant to cefazolin.   - Cefepime: R >16   - Cefoxitin: I 16   - Ceftriaxone: R >32 Enterobacter, Citrobacter, and Serratia may develop resistance during prolonged therapy   - Ciprofloxacin: R 2   - Ertapenem: S <=0.5   Specimen Source: .Urine None   Culture Results:   >100,000 CFU/ml Klebsiella pneumoniae ESBL   Organism Identification: Klebsiella pneumoniae ESBL   Organism: Klebsiella pneumoniae ESBL   Method Type: JARED Culture - Blood (21 @ 18:03)   - Tobramycin: S <=2   - Trimethoprim/Sulfamethoxazole: R >2/38   - Meropenem: S <=1   - Amikacin: S <=16   - Imipenem: S <=1   - Levofloxacin: R 4   - ESBL: Detec   - Klebsiella pneumoniae: Detec   - Piperacillin/Tazobactam: R <=8   Gram Stain:   Growth in aerobic bottle: Gram Negative Rods   - Ampicillin: R >16 These ampicillin results predict results for amoxicillin   - Ampicillin/Sulbactam: R >16/8 Enterobacter, Citrobacter, and Serratia may develop resistance during prolonged therapy (3-4 days)   - Ertapenem: S <=0.5   - Gentamicin: S <=2   - Ceftriaxone: R >32 Enterobacter, Citrobacter, and Serratia may develop resistance during prolonged therapy   - Ciprofloxacin: R 2   - Cefepime: R >16   - Cefoxitin: I 16   - Aztreonam: R >16   - Cefazolin: R >16 Enterobacter, Citrobacter, and Serratia may develop resistance during prolonged therapy (3-4 days)   Specimen Source: .Blood None   Organism: Blood Culture PCR   Organism: Klebsiella pneumoniae ESBL   Culture Results:   Growth in aerobic bottle: Klebsiella pneumoniae ESBL       Radiology: all available radiological tests reviewed  < from: Xray Chest 1 View-PORTABLE IMMEDIATE (21 @ 16:03) >    EXAM:  XR CHEST PORTABLE IMMED 1V                            PROCEDURE DATE:  2021          INTERPRETATION:  AP erect chest on May 28, 2021 at 4:01 PM. Patient has sepsis.    Heart magnified by technique.    The lung fields and pleural surfaces are unremarkable.    Chest is similar to 2016.    IMPRESSION: No acute finding or change.    < end of copied text >  < from: CT Abdomen and Pelvis w/ IV Cont (21 @ 20:04) >    EXAM:  CT ABDOMEN AND PELVIS IC                            PROCEDURE DATE:  2021          INTERPRETATION:  CLINICAL INFORMATION: History of removal of left renal calculi.    COMPARISON: 2018    CONTRAST/COMPLICATIONS:  IV Contrast: Omnipaque 350  90 cc administered   10 cc discarded  Oral Contrast: NONE  Complications: None reported at time of study completion    PROCEDURE:  CT of the Abdomen and Pelvis was performed.  Sagittal and coronal reformats were performed.    FINDINGS:  LOWER CHEST: No significant pleural or parenchymal abnormalities.    LIVER: Hepatic steatosis. Less than 5 mm hypodense focus hepatic dome region.  BILE DUCTS: Normal caliber.  GALLBLADDER: Gallstone. No gallbladder wall thickening. No pericholecystic fluid.  SPLEEN: Within normal limits.  PANCREAS: Within normal limits.  ADRENALS: Within normal limits.  KIDNEYS/URETERS: Nonspecific stranding of the bilateral perirenal fat identified with perirenal fluid, left greater than right. There are multiple left renal calculi noted within the mid and lower pole aspects measuring up to 1.2 cm. A 4 mm right renal calculus is noted within the lower pole region. Cystic foci noted within the left kidney measuring up to 2.1 cm. Note is made of an indwelling leftureteral stent. No calculi are noted along the course of the left ureteral stent.    BLADDER: No bladder calculi identified. Not distended, precluding assessment for bladder wall thickening.  REPRODUCTIVE ORGANS: The prostate is enlarged.    BOWEL: Fecal material scattered throughout the colon. No evidence for mechanical bowel obstruction. Colonic diverticulosis. No evidence for colonic wall thickening. No evidence for acute appendicitis.  PERITONEUM: No free intraperitoneal air or fluid.  VESSELS: Calcified left renal artery aneurysms identified.  RETROPERITONEUM/LYMPH NODES: No lymphadenopathy.  ABDOMINAL WALL: Fat containing right inguinal hernia.  BONES: Degenerative changes. Status post lumbar spine fusion surgery L2-L5.    IMPRESSION:  Nonspecific stranding of the bilateral perirenal fat identified with perirenal fluid, left greater than right. There are multiple left renal calculi noted within the mid and lower pole aspects measuring up to 1.2 cm. A 4 mm right renal calculus is noted within the lower pole region.  Note is made of an indwelling left ureteral stent. No calculi are noted along the course of the left ureteral stent.    Advanced directives addressed: full resuscitation
CHIEF COMPLAINT:  HPI:  80M w/ CAD, HTN, HLD, T2DM, BPH, kidney stones w/ recent surgical removal on 5/20 presents to the ED for positive blood and urine cultures.  Patient was recently admitted for UTI and was discharged from [facility] on 5/26 with PO Ceftin.  Blood and urine cultures resolved today and showed to be Klebsiella Pneumonia ESBL.  Patient was contacted and was told to come back to get IV Abx.   Patient had an appointment with his PCP today and he mentions his urinary urgency resolved, but he's still having frequency and has generalized weakness.  Patient denies any SOB, palpitations, dysuria, hematuria, pelvic pain, fever, chills, or flank pain.      SUBJECTIVE:   5/30/21: No reported overnight acute issues. Pt seen and examined at bedside this AM, denies any fever or chills, reports back pain for which pt is receiving tramadol, has h/o spinal fusion surgery. Pt updated on current management and pending midline placement. On day #2 of IV meropenem. Vital signs are stable.    REVIEW OF SYSTEMS:  CONSTITUTIONAL: No weakness, fevers or chills  EYES/ENT: No visual changes;  No vertigo or throat pain   NECK: No pain or stiffness  RESPIRATORY: No cough, wheezing, hemoptysis; No shortness of breath  CARDIOVASCULAR: No chest pain or palpitations  GASTROINTESTINAL: No abdominal or epigastric pain. No nausea, vomiting, or hematemesis; No diarrhea or constipation. No melena or hematochezia.  GENITOURINARY: No dysuria, frequency or hematuria  NEUROLOGICAL: No numbness or weakness  SKIN: No itching, burning, rashes, or lesions   All other review of systems is negative unless indicated above    Vital Signs Last 24 Hrs  T(C): 36.6 (30 May 2021 16:29), Max: 36.8 (29 May 2021 23:58)  T(F): 97.8 (30 May 2021 16:29), Max: 98.2 (29 May 2021 23:58)  HR: 79 (30 May 2021 16:29) (73 - 79)  BP: 117/72 (30 May 2021 16:29) (112/64 - 117/72)  RR: 18 (30 May 2021 16:29) (18 - 18)  SpO2: 97% (30 May 2021 16:29) (97% - 97%)      CAPILLARY BLOOD GLUCOSE  POCT Blood Glucose.: 182 mg/dL (30 May 2021 16:39)  POCT Blood Glucose.: 319 mg/dL (30 May 2021 12:03)  POCT Blood Glucose.: 263 mg/dL (30 May 2021 07:40)  POCT Blood Glucose.: 242 mg/dL (29 May 2021 21:32)      PHYSICAL EXAM:  Constitutional: NAD, awake and alert  HEENT: EOMI, Normal Hearing, MMM  Neck: Soft and supple, No LAD, No JVD  Respiratory: Breath sounds are clear bilaterally, No wheezing, rales or rhonchi  Cardiovascular: S1 and S2, regular rate and rhythm, no Murmurs, gallops or rubs  Gastrointestinal: Bowel sounds present, soft, nontender, nondistended, no guarding, no rebound  Extremities: No peripheral edema  Vascular: 2+ peripheral pulses  Neurological: A/O x 3, no focal deficits  Musculoskeletal: 5/5 strength b/l upper and lower extremities  Skin: No rashes    MEDICATIONS:  MEDICATIONS  (STANDING):  amLODIPine   Tablet 5 milliGRAM(s) Oral daily  aspirin enteric coated 81 milliGRAM(s) Oral daily  atorvastatin 40 milliGRAM(s) Oral at bedtime  dextrose 40% Gel 15 Gram(s) Oral once  dextrose 5%. 1000 milliLiter(s) (50 mL/Hr) IV Continuous <Continuous>  dextrose 5%. 1000 milliLiter(s) (100 mL/Hr) IV Continuous <Continuous>  dextrose 50% Injectable 25 Gram(s) IV Push once  dextrose 50% Injectable 12.5 Gram(s) IV Push once  dextrose 50% Injectable 25 Gram(s) IV Push once  doxazosin Oral Tab/Cap - Peds 4 milliGRAM(s) Oral daily  finasteride 5 milliGRAM(s) Oral daily  glucagon  Injectable 1 milliGRAM(s) IntraMuscular once  hydrochlorothiazide 12.5 milliGRAM(s) Oral daily  hydrocortisone 1% Cream 1 Application(s) Topical three times a day  insulin glargine Injectable (LANTUS) 25 Unit(s) SubCutaneous at bedtime  insulin lispro (ADMELOG) corrective regimen sliding scale   SubCutaneous three times a day before meals  insulin lispro (ADMELOG) corrective regimen sliding scale   SubCutaneous at bedtime  lisinopril 10 milliGRAM(s) Oral daily  meropenem  IVPB 1000 milliGRAM(s) IV Intermittent every 8 hours  metoprolol succinate  milliGRAM(s) Oral daily      LABS: All Labs Reviewed:                        11.8   6.40  )-----------( 241      ( 29 May 2021 07:58 )             37.3     05-29    138  |  103  |  23  ----------------------------<  212<H>  4.3   |  29  |  1.17    Ca    9.9      29 May 2021 07:58  Phos  3.0     05-29  Mg     2.0     05-29    TPro  7.4  /  Alb  2.7<L>  /  TBili  0.4  /  DBili  x   /  AST  36  /  ALT  105<H>  /  AlkPhos  85  05-29    
  Date of service: 21 @ 10:43    pt seen and examined  feels better  sitting up in chair  afebrile  denies flank pain/abd pain  wants to go home soon    ROS: no fever or chills; denies dizziness, no HA, no SOB or cough, no abdominal pain, no diarrhea or constipation; no legs pain, no rashes    MEDICATIONS  (STANDING):  amLODIPine   Tablet 5 milliGRAM(s) Oral daily  aspirin enteric coated 81 milliGRAM(s) Oral daily  atorvastatin 40 milliGRAM(s) Oral at bedtime  dextrose 40% Gel 15 Gram(s) Oral once  dextrose 5%. 1000 milliLiter(s) (50 mL/Hr) IV Continuous <Continuous>  dextrose 5%. 1000 milliLiter(s) (100 mL/Hr) IV Continuous <Continuous>  dextrose 50% Injectable 25 Gram(s) IV Push once  dextrose 50% Injectable 12.5 Gram(s) IV Push once  dextrose 50% Injectable 25 Gram(s) IV Push once  doxazosin Oral Tab/Cap - Peds 4 milliGRAM(s) Oral daily  finasteride 5 milliGRAM(s) Oral daily  glucagon  Injectable 1 milliGRAM(s) IntraMuscular once  hydrochlorothiazide 12.5 milliGRAM(s) Oral daily  hydrocortisone 1% Cream 1 Application(s) Topical three times a day  insulin glargine Injectable (LANTUS) 20 Unit(s) SubCutaneous at bedtime  insulin lispro (ADMELOG) corrective regimen sliding scale   SubCutaneous three times a day before meals  insulin lispro (ADMELOG) corrective regimen sliding scale   SubCutaneous at bedtime  lisinopril 10 milliGRAM(s) Oral daily  meropenem  IVPB 1000 milliGRAM(s) IV Intermittent every 8 hours  metoprolol succinate  milliGRAM(s) Oral daily    Vital Signs Last 24 Hrs  T(C): 36.4 (30 May 2021 07:52), Max: 36.8 (29 May 2021 23:58)  T(F): 97.5 (30 May 2021 07:52), Max: 98.2 (29 May 2021 23:58)  HR: 73 (30 May 2021 07:52) (73 - 77)  BP: 115/77 (30 May 2021 07:52) (112/64 - 125/73)  BP(mean): --  RR: 18 (30 May 2021 07:52) (18 - 18)  SpO2: 97% (30 May 2021 07:52) (96% - 97%)    PE:  Constitutional: NAD  HEENT: NC/AT, EOMI, PERRLA, conjunctivae clear; ears and nose atraumatic; pharynx benign  Neck: supple; thyroid not palpable  Back: no tenderness  Respiratory: respiratory effort normal; clear to auscultation  Cardiovascular: S1S2 regular, no murmurs  Abdomen: soft, not tender, not distended, positive BS; liver and spleen WNL  Genitourinary: no suprapubic tenderness  Lymphatic: no LN palpable  Musculoskeletal: no muscle tenderness, no joint swelling or tenderness  Extremities: no pedal edema  Neurological/ Psychiatric: AxOx3, Judgement and insight normal;  moving all extremities  Skin: no rashes; no palpable lesions    Labs: all available labs reviewed                        11.8   6.40  )-----------( 241      ( 29 May 2021 07:58 )             37.3         138  |  103  |  23  ----------------------------<  212<H>  4.3   |  29  |  1.17    Ca    9.9      29 May 2021 07:58  Phos  3.0       Mg     2.0         TPro  7.4  /  Alb  2.7<L>  /  TBili  0.4  /  DBili  x   /  AST  36  /  ALT  105<H>  /  AlkPhos  85          Urinalysis Basic - ( 28 May 2021 15:37 )    Color: Yellow / Appearance: very cloudy / S.010 / pH: x  Gluc: x / Ketone: Trace  / Bili: Negative / Urobili: Negative mg/dL   Blood: x / Protein: 100 mg/dL / Nitrite: Positive   Leuk Esterase: Moderate / RBC: 25-50 /HPF / WBC >50   Sq Epi: x / Non Sq Epi: Occasional / Bacteria: Occasional      Culture - Blood (21 @ 16:34)   Specimen Source: .Blood None   Culture Results:   No growth to date. Culture - Blood (21 @ 16:34)   Specimen Source: .Blood None   Culture Results:   No growth to date.   Culture - Urine (21 @ 18:03)   - Nitrofurantoin: S <=32 Should not be used to treat pyelonephritis   - Piperacillin/Tazobactam: S <=8   - Gentamicin: S <=2   - Imipenem: S <=1   - Amikacin: S <=16   - Levofloxacin: R 4   - Meropenem: S <=1   - Tigecycline: S <=2   - Tobramycin: S <=2   - Trimethoprim/Sulfamethoxazole: R >2/38   - Amoxicillin/Clavulanic Acid: S <=8/4   - Ampicillin: R >16 These ampicillin results predict results for amoxicillin   - Ampicillin/Sulbactam: R >16/8 Enterobacter, Citrobacter, and Serratia may develop resistance during prolonged therapy (3-4 days)   - Aztreonam: R >16   - Cefazolin: R >16 (MIC_CL_COM_ENTERIC_CEFAZU) For uncomplicated UTI with K. pneumoniae, E. coli, or P. mirablis: JARED <=16 is sensitive and JARED >=32 is resistant. This also predicts results for oral agents cefaclor, cefdinir, cefpodoxime, cefprozil, cefuroxime axetil, cephalexin and locarbef for uncomplicated UTI. Note that some isolates may be susceptible to these agents while testing resistant to cefazolin.   - Cefepime: R >16   - Cefoxitin: I 16   - Ceftriaxone: R >32 Enterobacter, Citrobacter, and Serratia may develop resistance during prolonged therapy   - Ciprofloxacin: R 2   - Ertapenem: S <=0.5   Specimen Source: .Urine None   Culture Results:   >100,000 CFU/ml Klebsiella pneumoniae ESBL   Organism Identification: Klebsiella pneumoniae ESBL   Organism: Klebsiella pneumoniae ESBL   Method Type: JARED Culture - Blood (21 @ 18:03)   - Tobramycin: S <=2   - Trimethoprim/Sulfamethoxazole: R >2/38   - Meropenem: S <=1   - Amikacin: S <=16   - Imipenem: S <=1   - Levofloxacin: R 4   - ESBL: Detec   - Klebsiella pneumoniae: Detec   - Piperacillin/Tazobactam: R <=8   Gram Stain:   Growth in aerobic bottle: Gram Negative Rods   - Ampicillin: R >16 These ampicillin results predict results for amoxicillin   - Ampicillin/Sulbactam: R >16/8 Enterobacter, Citrobacter, and Serratia may develop resistance during prolonged therapy (3-4 days)   - Ertapenem: S <=0.5   - Gentamicin: S <=2   - Ceftriaxone: R >32 Enterobacter, Citrobacter, and Serratia may develop resistance during prolonged therapy   - Ciprofloxacin: R 2   - Cefepime: R >16   - Cefoxitin: I 16   - Aztreonam: R >16   - Cefazolin: R >16 Enterobacter, Citrobacter, and Serratia may develop resistance during prolonged therapy (3-4 days)   Specimen Source: .Blood None   Organism: Blood Culture PCR   Organism: Klebsiella pneumoniae ESBL   Culture Results:   Growth in aerobic bottle: Klebsiella pneumoniae ESBL       Radiology: all available radiological tests reviewed  < from: Xray Chest 1 View-PORTABLE IMMEDIATE (21 @ 16:03) >    EXAM:  XR CHEST PORTABLE IMMED 1V                            PROCEDURE DATE:  2021          INTERPRETATION:  AP erect chest on May 28, 2021 at 4:01 PM. Patient has sepsis.    Heart magnified by technique.    The lung fields and pleural surfaces are unremarkable.    Chest is similar to 2016.    IMPRESSION: No acute finding or change.    < end of copied text >  < from: CT Abdomen and Pelvis w/ IV Cont (21 @ 20:04) >    EXAM:  CT ABDOMEN AND PELVIS IC                            PROCEDURE DATE:  2021          INTERPRETATION:  CLINICAL INFORMATION: History of removal of left renal calculi.    COMPARISON: 2018    CONTRAST/COMPLICATIONS:  IV Contrast: Omnipaque 350  90 cc administered   10 cc discarded  Oral Contrast: NONE  Complications: None reported at time of study completion    PROCEDURE:  CT of the Abdomen and Pelvis was performed.  Sagittal and coronal reformats were performed.    FINDINGS:  LOWER CHEST: No significant pleural or parenchymal abnormalities.    LIVER: Hepatic steatosis. Less than 5 mm hypodense focus hepatic dome region.  BILE DUCTS: Normal caliber.  GALLBLADDER: Gallstone. No gallbladder wall thickening. No pericholecystic fluid.  SPLEEN: Within normal limits.  PANCREAS: Within normal limits.  ADRENALS: Within normal limits.  KIDNEYS/URETERS: Nonspecific stranding of the bilateral perirenal fat identified with perirenal fluid, left greater than right. There are multiple left renal calculi noted within the mid and lower pole aspects measuring up to 1.2 cm. A 4 mm right renal calculus is noted within the lower pole region. Cystic foci noted within the left kidney measuring up to 2.1 cm. Note is made of an indwelling leftureteral stent. No calculi are noted along the course of the left ureteral stent.    BLADDER: No bladder calculi identified. Not distended, precluding assessment for bladder wall thickening.  REPRODUCTIVE ORGANS: The prostate is enlarged.    BOWEL: Fecal material scattered throughout the colon. No evidence for mechanical bowel obstruction. Colonic diverticulosis. No evidence for colonic wall thickening. No evidence for acute appendicitis.  PERITONEUM: No free intraperitoneal air or fluid.  VESSELS: Calcified left renal artery aneurysms identified.  RETROPERITONEUM/LYMPH NODES: No lymphadenopathy.  ABDOMINAL WALL: Fat containing right inguinal hernia.  BONES: Degenerative changes. Status post lumbar spine fusion surgery L2-L5.    IMPRESSION:  Nonspecific stranding of the bilateral perirenal fat identified with perirenal fluid, left greater than right. There are multiple left renal calculi noted within the mid and lower pole aspects measuring up to 1.2 cm. A 4 mm right renal calculus is noted within the lower pole region.  Note is made of an indwelling left ureteral stent. No calculi are noted along the course of the left ureteral stent.    Advanced directives addressed: full resuscitation

## 2021-05-31 NOTE — CHART NOTE - NSCHARTNOTEFT_GEN_A_CORE
It was extensively discussed with patient about the need of long acting insulin for better control of his sugars and avoidance of possible complications. Patient refused starting insulin even after explaining the possible risks associated with markedly elevated sugars including DKA and possible death. Patient able to show understanding via teach back method and demonstrates understanding. Patient to continue home hyperglycemic oral medications and I highly recommend/ suggested closely following up with PCP within 1-3 days for blood glucose check and treatment adjustment.     d/w dr. Childers

## 2021-05-31 NOTE — DISCHARGE NOTE PROVIDER - CARE PROVIDER_API CALL
Dg Collier)  Family Medicine  [address] [place] [address]  Phone: [phone]  Fax: [phone]  Established Patient  Follow Up Time: 1 week

## 2021-05-31 NOTE — PROGRESS NOTE ADULT - ASSESSMENT
Pt has been seen and examined with FP resident, resident supervised agree with a/p       Patient is a 80y old  Male who presents with a chief complaint of UTI, +BCx and +UCx (31 May 2021 15:31)      HPI:  80M w/ CAD, HTN, HLD, T2DM, BPH, kidney stones w/ recent surgical removal on 5/20 presents to the ED for positive blood and urine cultures.  Patient was recently admitted for UTI and was discharged from [facility] on 5/26 with PO Ceftin.  Blood and urine cultures resolved today and showed to be Klebsiella Pneumonia ESBL.  Patient was contacted and was told to come back to get IV Abx.   Patient had an appointment with his PCP today and he mentions his urinary urgency resolved, but he's still having frequency and has generalized weakness.  Patient denies any SOB, palpitations, dysuria, hematuria, pelvic pain, fever, chills, or flank pain.       (28 May 2021 18:08)      PHYSICAL EXAM:  Vital Signs Last 24 Hrs  T(C): 36.5 (31 May 2021 15:57), Max: 36.7 (30 May 2021 22:25)  T(F): 97.7 (31 May 2021 15:57), Max: 98.1 (30 May 2021 22:25)  HR: 71 (31 May 2021 15:57) (70 - 79)  BP: 117/74 (31 May 2021 15:57) (112/70 - 120/70)  BP(mean): --  RR: 17 (31 May 2021 15:57) (17 - 18)  SpO2: 97% (31 May 2021 15:57) (94% - 97%)  -rs-aeeb, cta  -cvs-s1s2 normal   -p/a-soft,bs+      A/P    #control of blood sugar, diabetic education and discharge plan

## 2021-05-31 NOTE — ADVANCED PRACTICE NURSE CONSULT - ASSESSMENT
Pt confirmed using two identifiers (name and ). Pt is alert and oriented and consented to procedure. Reviewed chart and provider orders. Pt prepped in sterile fashion. Using strict aseptic technique that was maintained throughout procedure, guided by ultrasound, Arrow Endurance extended dwell peripheral midline catheter Lot#[id number] 18ga 8cm, was placed in the left cephalic vein as per MD order. Brisk blood return observed and flushed with 10cc NS. Stat lock and Biopatch applied. Site dressed and Curos cap applied.  Pt tolerated procedure well. Midline care instructions were given to patient. All questions answered. Do Not Use Extremity band applied to left wrist. Report given to primary RN.

## 2021-05-31 NOTE — DISCHARGE NOTE PROVIDER - NSDCCAREPROVSEEN_GEN_ALL_CORE_FT
Freddie, Aminah Disla, Emely Carpenter, Fanta Duckworth, Lloyd Childers, Nando Brown, Дмитрий Palma, Juan Zavala, Cookie Bowles, Maribell Lyle, Trupti Elias

## 2021-06-02 LAB
CULTURE RESULTS: SIGNIFICANT CHANGE UP
CULTURE RESULTS: SIGNIFICANT CHANGE UP
SPECIMEN SOURCE: SIGNIFICANT CHANGE UP
SPECIMEN SOURCE: SIGNIFICANT CHANGE UP

## 2021-06-03 NOTE — CHART NOTE - NSCHARTNOTEFT_GEN_A_CORE
Addendum to this pt during my care     #Sepsis is due to UTI and associated with previous surgery -?ureteral stent or stone removal

## 2021-06-10 DIAGNOSIS — E78.5 HYPERLIPIDEMIA, UNSPECIFIED: ICD-10-CM

## 2021-06-10 DIAGNOSIS — Z16.12 EXTENDED SPECTRUM BETA LACTAMASE (ESBL) RESISTANCE: ICD-10-CM

## 2021-06-10 DIAGNOSIS — N39.0 URINARY TRACT INFECTION, SITE NOT SPECIFIED: ICD-10-CM

## 2021-06-10 DIAGNOSIS — E11.65 TYPE 2 DIABETES MELLITUS WITH HYPERGLYCEMIA: ICD-10-CM

## 2021-06-10 DIAGNOSIS — T81.44XA SEPSIS FOLLOWING A PROCEDURE, INITIAL ENCOUNTER: ICD-10-CM

## 2021-06-10 DIAGNOSIS — D64.9 ANEMIA, UNSPECIFIED: ICD-10-CM

## 2021-06-10 DIAGNOSIS — I35.0 NONRHEUMATIC AORTIC (VALVE) STENOSIS: ICD-10-CM

## 2021-06-10 DIAGNOSIS — Z79.82 LONG TERM (CURRENT) USE OF ASPIRIN: ICD-10-CM

## 2021-06-10 DIAGNOSIS — Z79.84 LONG TERM (CURRENT) USE OF ORAL HYPOGLYCEMIC DRUGS: ICD-10-CM

## 2021-06-10 DIAGNOSIS — N28.1 CYST OF KIDNEY, ACQUIRED: ICD-10-CM

## 2021-06-10 DIAGNOSIS — I25.10 ATHEROSCLEROTIC HEART DISEASE OF NATIVE CORONARY ARTERY WITHOUT ANGINA PECTORIS: ICD-10-CM

## 2021-06-10 DIAGNOSIS — Z66 DO NOT RESUSCITATE: ICD-10-CM
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
## 2021-06-10 DIAGNOSIS — N40.0 BENIGN PROSTATIC HYPERPLASIA WITHOUT LOWER URINARY TRACT SYMPTOMS: ICD-10-CM

## 2021-06-10 DIAGNOSIS — A41.59 OTHER GRAM-NEGATIVE SEPSIS: ICD-10-CM

## 2021-12-02 NOTE — ED PROVIDER NOTE - NEUROLOGICAL, MLM
Alert and oriented, no focal deficits, no motor or sensory deficits. Z Plasty Text: The lesion was extirpated to the level of the fat with a #15 scalpel blade.  Given the location of the defect, shape of the defect and the proximity to free margins a Z-plasty was deemed most appropriate for repair.  Using a sterile surgical marker, the appropriate transposition arms of the Z-plasty were drawn incorporating the defect and placing the expected incisions within the relaxed skin tension lines where possible.    The area thus outlined was incised deep to adipose tissue with a #15 scalpel blade.  The skin margins were undermined to an appropriate distance in all directions utilizing iris scissors.  The opposing transposition arms were then transposed into place in opposite direction and anchored with interrupted buried subcutaneous sutures.

## 2022-05-19 NOTE — ED ADULT NURSE NOTE - FINAL NURSING ELECTRONIC SIGNATURE
V2.0  Discharge Summary    Name:  Simon Martinez /Age/Sex: 1948 (57 y.o. female)   Admit Date: 5/15/2022  Discharge Date: 22    MRN & CSN:  [id number] & [id number] Encounter Date and Time 22 10:19 AM EDT    Attending:  Oskar Keating MD Discharging Provider: Mathew Durant [facility] Course:     Brief HPI: Erica Barrios a 69 y. o.  female obesity, complete heart block status post PPM, colon cancer status post colectomy, COPD, right breast cancer status post right radical mastectomy, anxiety and depression, nocturnal hypoxemia on 2L of NC O2 and hyperlipidemia admitted for right obstructive uropathy and complicated UTI after presenting to ED with abdominal pain, generalized weakness and inability to ambulate for about 5 days following a fall that occurred 1 week prior to admission.     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  Repeat study of CT abdomen/pelvis with and without contrast was completed today  and still showing gas in the uterus suggestive of utero enteric fistula or infection with gas-forming organisms.  -Pelvic ultrasound nonacute  -Remains afebrile without leukocytosis today  - spoke with Dr. Tevin Quinteros of gynecology - feel fistula less likely given lack of symptoms, concern for endometrial malignancy vs. Infection although patient clinically looks much better infection wise, he is planning for [facility]  with biopsy on an outpatient basis and recommended to continue ceftin and add flagyl until her follow-up. Discussed plan with patient and family.      Obstructive uropathy   -CT abdomen/pelvis with 11 mm mid right ureteral nephrolith  -Evaluated by urology and status post right retrograde pyelogram with stent placement  - reeder discontinued and patient urinating without difficulty   -Continue Flomax on discharge, follow-up with urology in 1-2 weeks       Sepsis secondary to Proteus mirabilis UTI   - sepsis resolved   - Cefriaxone transitioned to Cefuroxime for a total of 7 days on discharge     Mechanical fall at home  Physical deconditioning    2/2 generalized weakness   -CT head, CT C/T/L-spine, x-ray of left/right femur, x-ray of left/right knee all nonacute   -PT/OT following. Recommend SNF. Patient discharged to [facility]     Elevated troponin  -Trended down to normal range.  2/2 type II demand ischemia  - denied chest pain   -EKG with atrial sensed ventricular paced rhythm. This patient was seen and examined in conjunction with Dr. Skyler Quezada. He was agreeable with patient's plan at discharge as dictated above. The patient expressed appropriate understanding of, and agreement with the discharge recommendations, medications, and plan.      Consults this admission:  IP CONSULT TO NEPHROLOGY  IP CONSULT TO HOSPITALIST  IP CONSULT TO UROLOGY  IP CONSULT TO OB GYN    Discharge Diagnosis:   Complicated urinary tract infection      Discharge Instruction:   Follow up appointments: PCP, urology, gynecology  Primary care physician: Nisha Willis DO within 2 weeks  Diet: regular diet   Activity: activity as tolerated  Disposition: Discharged to:   []Home, [][facility], [x]SNF, []Acute Rehab, []Hospice   Condition on discharge: Stable  Labs and Tests to be Followed up as an outpatient by PCP or Specialist: CBC, BMP    Discharge Medications:        Medication List      START taking these medications    cefUROXime 250 MG tablet  Commonly known as: CEFTIN  Take 1 tablet by mouth every 12 hours for 10 doses     methocarbamol 500 MG tablet  Commonly known as: ROBAXIN  Take 1 tablet by mouth 3 times daily as needed (muscle spasms, pain)     metroNIDAZOLE 500 MG tablet  Commonly known as: FLAGYL  Take 1 tablet by mouth 3 times daily for 7 days     polyethylene glycol 17 g packet  Commonly known as: GLYCOLAX  Take 17 g by mouth daily  Start taking on: May 20, 2022     tamsulosin 0.4 MG capsule  Commonly known as: FLOMAX  Take 1 capsule by mouth daily for 14 days        CONTINUE taking these medications    albuterol sulfate  (90 Base) MCG/ACT inhaler  Commonly known as: ProAir HFA  INHALE 2 PUFFS BY MOUTH EVERY SIX HOURS AS NEEDED FOR WHEEZING     albuterol-ipratropium  MCG/ACT Aers inhaler  Commonly known as: COMBIVENT RESPIMAT  Inhale 1 puff into the lungs 4 times daily     aspirin 81 MG EC tablet     ezetimibe 10 MG tablet  Commonly known as: ZETIA  TAKE 1 TABLET BY MOUTH EVERY MORNING     fluticasone 110 MCG/ACT inhaler  Commonly known as: Flovent HFA  Inhale 2 puffs into the lungs 2 times daily     gabapentin 100 MG capsule  Commonly known as: NEURONTIN     Iron 325 (65 Fe) MG Tabs  Take 1 tablet by mouth every other day     letrozole 2.5 MG tablet  Commonly known as: FEMARA  Take 1 tablet by mouth nightly     Oscal 500/200 D-3 500-200 MG-UNIT per tablet  Generic drug: calcium-vitamin D     sertraline 100 MG tablet  Commonly known as: ZOLOFT  TAKE 2 TABLETS BY MOUTH EVERY MORNING     simvastatin 20 MG tablet  Commonly known as: ZOCOR  TAKE ONE (1) TABLET BY MOUTH NIGHTLY     TYLENOL 500 MG tablet  Generic drug: acetaminophen     vitamin B-12 1000 MCG tablet  Commonly known as: CYANOCOBALAMIN           Where to Get Your Medications      Information about where to get these medications is not yet available    Ask your nurse or doctor about these medications  cefUROXime 250 MG tablet  methocarbamol 500 MG tablet  metroNIDAZOLE 500 MG tablet  polyethylene glycol 17 g packet  tamsulosin 0.4 MG capsule        Objective Findings at Discharge:   BP (!) 127/59   Pulse 68   Temp 98.2 °F (36.8 °C) (Oral)   Resp 17   Ht 5' 2\" (1.575 m)   Wt 281 lb 8.4 oz (127.7 kg)   LMP 01/15/1999   SpO2 94%   BMI 51.49 kg/m²       Physical Exam:   General: NAD, obese   Eyes: EOMI  ENT: neck supple  Cardiovascular: Regular rate.   Respiratory: Clear to auscultation bilaterally, respirations even and unlabored on RA   Gastrointestinal: Abdomen soft, non tender, bowel sounds active, no rebound or guarding   Genitourinary: no suprapubic tenderness  Musculoskeletal: No edema  Skin: warm, dry  Neuro: Alert. Psych: Mood appropriate. Labs and Imaging   CT ABDOMEN PELVIS WO CONTRAST Additional Contrast? None    Result Date: 5/15/2022  EXAMINATION: CT OF THE CHEST WITHOUT CONTRAST; CT OF THE ABDOMEN AND PELVIS WITHOUT CONTRAST; CT OF THE LUMBAR SPINE WITHOUT CONTRAST; CT OF THE THORACIC SPINE WITHOUT CONTRAST 5/15/2022 5:51 am; 5/15/2022 5:52 am; 5/15/2022 5:53 am TECHNIQUE: CT of the chest was performed without the administration of intravenous contrast. Multiplanar reformatted images are provided for review. Automated exposure control, iterative reconstruction, and/or weight based adjustment of the mA/kV was utilized to reduce the radiation dose to as low as reasonably achievable.; CT of the abdomen and pelvis was performed without the administration of intravenous contrast. Multiplanar reformatted images are provided for review. Automated exposure control, iterative reconstruction, and/or weight based adjustment of the mA/kV was utilized to reduce the radiation dose to as low as reasonably achievable.; CT of the lumbar spine was performed without the administration of intravenous contrast. Multiplanar reformatted images are provided for review. Adjustment of mA and/or kV according to patient size was utilized. Automated exposure control, iterative reconstruction, and/or weight based adjustment of the mA/kV was utilized to reduce the radiation dose to as low as reasonably achievable.; CT of the thoracic spine was performed without the administration of intravenous contrast. Multiplanar reformatted images are provided for review.  Automated exposure control, iterative reconstruction, and/or weight based adjustment of the mA/kV was utilized to reduce the radiation dose to as low as reasonably achievable. COMPARISON: Chest radiograph today, chest CT 06/26/2019, CT abdomen and pelvis 07/28/2018 HISTORY: ORDERING SYSTEM PROVIDED HISTORY: Fall, left chest ecchymosis TECHNOLOGIST PROVIDED HISTORY: Reason for exam:->Fall, left chest ecchymosis Reason for Exam: Fall, left chest ecchymosis; ORDERING SYSTEM PROVIDED HISTORY: Diffuse abdominal pain TECHNOLOGIST PROVIDED HISTORY: Reason for exam:->Diffuse abdominal pain Additional Contrast?->None Reason for Exam: Diffuse abdominal pain; ORDERING SYSTEM PROVIDED HISTORY: fall TECHNOLOGIST PROVIDED HISTORY: Reason for exam:->fall Reason for Exam: fall; ORDERING SYSTEM PROVIDED HISTORY: fall TECHNOLOGIST PROVIDED HISTORY: Reason for exam:->fall Decision Support Exception - unselect if not a suspected or confirmed emergency medical condition->Emergency Medical Condition (MA) Reason for Exam: fall FINDINGS: Chest: Mediastinum: No mediastinal hematoma. No pericardial effusion. Left subclavian dual chamber pacer in place. Lungs/pleura: No pneumothorax or effusion. Mild respiratory motion artifact is noted without evidence for acute airspace disease. The central airway is patent. Soft Tissues/Bones: Motion artifact is noted. Slight offset of the sternal body near the sternomanubrial junction appears due to motion artifact. No displaced fracture identified. Abdomen/Pelvis: Organs: Evaluation of the abdominal and pelvic viscera is limited in the absence of contrast.  No solid organ injury identified. An obstructing 11 mm stone is present in the mid right ureter resulting in mild hydronephrosis. Bilateral renal calculi are also present with a 1.7 cm stone in the left renal pelvis and mild caliectasis noted. GI/Bowel: There is no bowel dilatation or wall thickening identified. Moderate distal colonic stool burden. Status post partial sigmoid resection. Diverticulosis. Pelvis: Gas within the uterine cavity is noted.   Curvilinear soft tissue is noted extending towards the sigmoid colon and colonic surgical clips. Peritoneum/Retroperitoneum: No free air. No free fluid. The aorta is normal in caliber. Bones/Soft Tissues: Pelvic alignment maintained. No fracture identified. No soft tissue hematoma. THORACIC: BONES/ALIGNMENT: Osteopenia. There is normal alignment of the spine. The vertebral body heights are maintained. No osseous destructive lesion is seen. DEGENERATIVE CHANGES: No gross spinal canal stenosis or bony neural foraminal narrowing of the thoracic spine. SOFT TISSUES: No paraspinal hematoma. LUMBAR: BONES/ALIGNMENT: Osteopenia. There is normal alignment of the spine. The vertebral body heights are maintained. No osseous destructive lesion is seen. DEGENERATIVE CHANGES: No gross spinal canal stenosis or bony neural foraminal narrowing of the lumbar spine. SOFT TISSUES: No paraspinal hematoma. 1.  No acute traumatic injury identified in the chest, abdomen or pelvis, within the limits of a noncontrast exam.  Motion artifact does degrade evaluation of the osseous structures in the chest however. 2.  No acute airspace disease identified. 3.  Mildly obstructing 11 mm stone in the mid right ureter. Bilateral nephrolithiasis. 4.  Gas within the uterine cavity is noted, suggestive of underlying fistula. 5.  No acute osseous abnormality identified in the thoracic or lumbar spine. CT HEAD WO CONTRAST    Result Date: 5/15/2022  EXAMINATION: CT OF THE CERVICAL SPINE WITHOUT CONTRAST; CT OF THE HEAD WITHOUT CONTRAST 5/15/2022 5:48 am; 5/15/2022 5:47 am TECHNIQUE: CT of the cervical spine was performed without the administration of intravenous contrast. Multiplanar reformatted images are provided for review.  Automated exposure control, iterative reconstruction, and/or weight based adjustment of the mA/kV was utilized to reduce the radiation dose to as low as reasonably achievable.; CT of the head was performed without the administration of intravenous contrast. Automated exposure control, iterative reconstruction, and/or weight based adjustment of the mA/kV was utilized to reduce the radiation dose to as low as reasonably achievable. COMPARISON: 06/26/2014 HISTORY: ORDERING SYSTEM PROVIDED HISTORY: fall TECHNOLOGIST PROVIDED HISTORY: Reason for exam:->fall Decision Support Exception - unselect if not a suspected or confirmed emergency medical condition->Emergency Medical Condition (MA) Reason for Exam: fall; ORDERING SYSTEM PROVIDED HISTORY: fall TECHNOLOGIST PROVIDED HISTORY: Reason for exam:->fall Has a \"code stroke\" or \"stroke alert\" been called? ->No Decision Support Exception - unselect if not a suspected or confirmed emergency medical condition->Emergency Medical Condition (MA) Reason for Exam: fall FINDINGS: HEAD: BRAIN/VENTRICLES: There is no acute intracranial hemorrhage, mass effect or midline shift. No abnormal extra-axial fluid collection. ORBITS: The visualized portion of the orbits demonstrate no acute abnormality. SINUSES: The visualized paranasal sinuses and mastoid air cells demonstrate no acute abnormality. SOFT TISSUES/SKULL: No acute abnormality of the visualized skull or soft tissues. CERVICAL SPINE: BONES/ALIGNMENT: Motion artifact noted. No convincing evidence for fracture or traumatic malalignment. DEGENERATIVE CHANGES: Multilevel degenerative disc disease and facet arthropathy. SOFT TISSUES: There is no prevertebral soft tissue swelling. 1.  Chronic findings in the brain without acute CT abnormality identified. 2.  Motion degraded exam of the cervical spine. No convincing evidence for fracture or traumatic malalignment.      CT CHEST WO CONTRAST    Result Date: 5/15/2022  EXAMINATION: CT OF THE CHEST WITHOUT CONTRAST; CT OF THE ABDOMEN AND PELVIS WITHOUT CONTRAST; CT OF THE LUMBAR SPINE WITHOUT CONTRAST; CT OF THE THORACIC SPINE WITHOUT CONTRAST 5/15/2022 5:51 am; 5/15/2022 5:52 am; 5/15/2022 5:53 am TECHNIQUE: CT of the chest was performed without the administration of intravenous contrast. Multiplanar reformatted images are provided for review. Automated exposure control, iterative reconstruction, and/or weight based adjustment of the mA/kV was utilized to reduce the radiation dose to as low as reasonably achievable.; CT of the abdomen and pelvis was performed without the administration of intravenous contrast. Multiplanar reformatted images are provided for review. Automated exposure control, iterative reconstruction, and/or weight based adjustment of the mA/kV was utilized to reduce the radiation dose to as low as reasonably achievable.; CT of the lumbar spine was performed without the administration of intravenous contrast. Multiplanar reformatted images are provided for review. Adjustment of mA and/or kV according to patient size was utilized. Automated exposure control, iterative reconstruction, and/or weight based adjustment of the mA/kV was utilized to reduce the radiation dose to as low as reasonably achievable.; CT of the thoracic spine was performed without the administration of intravenous contrast. Multiplanar reformatted images are provided for review. Automated exposure control, iterative reconstruction, and/or weight based adjustment of the mA/kV was utilized to reduce the radiation dose to as low as reasonably achievable.  COMPARISON: Chest radiograph today, chest CT 06/26/2019, CT abdomen and pelvis 07/28/2018 HISTORY: ORDERING SYSTEM PROVIDED HISTORY: Fall, left chest ecchymosis TECHNOLOGIST PROVIDED HISTORY: Reason for exam:->Fall, left chest ecchymosis Reason for Exam: Fall, left chest ecchymosis; ORDERING SYSTEM PROVIDED HISTORY: Diffuse abdominal pain TECHNOLOGIST PROVIDED HISTORY: Reason for exam:->Diffuse abdominal pain Additional Contrast?->None Reason for Exam: Diffuse abdominal pain; ORDERING SYSTEM PROVIDED HISTORY: fall TECHNOLOGIST PROVIDED HISTORY: Reason for exam:->fall Reason for Exam: fall; [address] [facility] PROVIDED HISTORY: fall TECHNOLOGIST PROVIDED HISTORY: Reason for exam:->fall Decision Support Exception - unselect if not a suspected or confirmed emergency medical condition->Emergency Medical Condition (MA) Reason for Exam: fall FINDINGS: Chest: Mediastinum: No mediastinal hematoma. No pericardial effusion. Left subclavian dual chamber pacer in place. Lungs/pleura: No pneumothorax or effusion. Mild respiratory motion artifact is noted without evidence for acute airspace disease. The central airway is patent. Soft Tissues/Bones: Motion artifact is noted. Slight offset of the sternal body near the sternomanubrial junction appears due to motion artifact. No displaced fracture identified. Abdomen/Pelvis: Organs: Evaluation of the abdominal and pelvic viscera is limited in the absence of contrast.  No solid organ injury identified. An obstructing 11 mm stone is present in the mid right ureter resulting in mild hydronephrosis. Bilateral renal calculi are also present with a 1.7 cm stone in the left renal pelvis and mild caliectasis noted. GI/Bowel: There is no bowel dilatation or wall thickening identified. Moderate distal colonic stool burden. Status post partial sigmoid resection. Diverticulosis. Pelvis: Gas within the uterine cavity is noted. Curvilinear soft tissue is noted extending towards the sigmoid colon and colonic surgical clips. Peritoneum/Retroperitoneum: No free air. No free fluid. The aorta is normal in caliber. Bones/Soft Tissues: Pelvic alignment maintained. No fracture identified. No soft tissue hematoma. THORACIC: BONES/ALIGNMENT: Osteopenia. There is normal alignment of the spine. The vertebral body heights are maintained. No osseous destructive lesion is seen. DEGENERATIVE CHANGES: No gross spinal canal stenosis or bony neural foraminal narrowing of the thoracic spine. SOFT TISSUES: No paraspinal hematoma. LUMBAR: BONES/ALIGNMENT: Osteopenia. There is normal alignment of the spine. The vertebral body heights are maintained. No osseous destructive lesion is seen. DEGENERATIVE CHANGES: No gross spinal canal stenosis or bony neural foraminal narrowing of the lumbar spine. SOFT TISSUES: No paraspinal hematoma. 1.  No acute traumatic injury identified in the chest, abdomen or pelvis, within the limits of a noncontrast exam.  Motion artifact does degrade evaluation of the osseous structures in the chest however. 2.  No acute airspace disease identified. 3.  Mildly obstructing 11 mm stone in the mid right ureter. Bilateral nephrolithiasis. 4.  Gas within the uterine cavity is noted, suggestive of underlying fistula. 5.  No acute osseous abnormality identified in the thoracic or lumbar spine. CT CERVICAL SPINE WO CONTRAST    Result Date: 5/15/2022  EXAMINATION: CT OF THE CERVICAL SPINE WITHOUT CONTRAST; CT OF THE HEAD WITHOUT CONTRAST 5/15/2022 5:48 am; 5/15/2022 5:47 am TECHNIQUE: CT of the cervical spine was performed without the administration of intravenous contrast. Multiplanar reformatted images are provided for review. Automated exposure control, iterative reconstruction, and/or weight based adjustment of the mA/kV was utilized to reduce the radiation dose to as low as reasonably achievable.; CT of the head was performed without the administration of intravenous contrast. Automated exposure control, iterative reconstruction, and/or weight based adjustment of the mA/kV was utilized to reduce the radiation dose to as low as reasonably achievable. COMPARISON: 06/26/2014 HISTORY: ORDERING SYSTEM PROVIDED HISTORY: fall TECHNOLOGIST PROVIDED HISTORY: Reason for exam:->fall Decision Support Exception - unselect if not a suspected or confirmed emergency medical condition->Emergency Medical Condition (MA) Reason for Exam: fall; ORDERING SYSTEM PROVIDED HISTORY: fall TECHNOLOGIST PROVIDED HISTORY: Reason for exam:->fall Has a \"code stroke\" or \"stroke alert\" been called? ->No Decision Support Exception - unselect if not a suspected or confirmed emergency medical condition->Emergency Medical Condition (MA) Reason for Exam: fall FINDINGS: HEAD: BRAIN/VENTRICLES: There is no acute intracranial hemorrhage, mass effect or midline shift. No abnormal extra-axial fluid collection. ORBITS: The visualized portion of the orbits demonstrate no acute abnormality. SINUSES: The visualized paranasal sinuses and mastoid air cells demonstrate no acute abnormality. SOFT TISSUES/SKULL: No acute abnormality of the visualized skull or soft tissues. CERVICAL SPINE: BONES/ALIGNMENT: Motion artifact noted. No convincing evidence for fracture or traumatic malalignment. DEGENERATIVE CHANGES: Multilevel degenerative disc disease and facet arthropathy. SOFT TISSUES: There is no prevertebral soft tissue swelling. 1.  Chronic findings in the brain without acute CT abnormality identified. 2.  Motion degraded exam of the cervical spine. No convincing evidence for fracture or traumatic malalignment. US NON OB TRANSVAGINAL    Result Date: 5/18/2022  EXAMINATION: PELVIC ULTRASOUND 5/18/2022 TECHNIQUE: Transabdominal and transvaginal pelvic duplex ultrasound using B-mode/gray scaled imaging, Doppler spectral analysis and color flow Doppler was obtained. COMPARISON: None HISTORY: ORDERING SYSTEM PROVIDED HISTORY: CT scan shows gas in the uterus and possible fistula. Based on ultrasound findings, will then decide on hysteroscopy and D&C versus an HSG type fisulogram TECHNOLOGIST PROVIDED HISTORY: Reason for exam:->CT scan shows gas in the uterus and possible fistula.  Based on ultrasound findings, will then decide on hysteroscopy and D&C versus an HSG type fisulogram Reason for Exam: Abnormal UT on CT scan; ORDERING SYSTEM PROVIDED HISTORY: Abnormal CT TECHNOLOGIST PROVIDED HISTORY: Reason for exam:->Abnormal CT FINDINGS: Measurements: Uterus: 6.6 x 4.2 x 2.1 cm Endometrial stripe: Visually within normal limits Right Ovary:Not seen Left Ovary: Not seen Ultrasound Findings: Uterus: Uterus demonstrates normal myometrial echotexture. No abnormal vascularity seen on Doppler imaging. Endometrial stripe: Endometrial stripe is within normal limits. Right Ovary: Not seen. Left Ovary:  Not seen. Free Fluid: No evidence of free fluid. Unremarkable uterus. Unable to identify ovaries sonographically given bowel gas. US PELVIS LIMITED    Result Date: 5/18/2022  EXAMINATION: PELVIC ULTRASOUND 5/18/2022 TECHNIQUE: Transabdominal and transvaginal pelvic duplex ultrasound using B-mode/gray scaled imaging, Doppler spectral analysis and color flow Doppler was obtained. COMPARISON: None HISTORY: ORDERING SYSTEM PROVIDED HISTORY: CT scan shows gas in the uterus and possible fistula. Based on ultrasound findings, will then decide on hysteroscopy and D&C versus an HSG type fisulogram TECHNOLOGIST PROVIDED HISTORY: Reason for exam:->CT scan shows gas in the uterus and possible fistula. Based on ultrasound findings, will then decide on hysteroscopy and D&C versus an HSG type fisulogram Reason for Exam: Abnormal UT on CT scan; ORDERING SYSTEM PROVIDED HISTORY: Abnormal CT TECHNOLOGIST PROVIDED HISTORY: Reason for exam:->Abnormal CT FINDINGS: Measurements: Uterus: 6.6 x 4.2 x 2.1 cm Endometrial stripe: Visually within normal limits Right Ovary:Not seen Left Ovary: Not seen Ultrasound Findings: Uterus: Uterus demonstrates normal myometrial echotexture. No abnormal vascularity seen on Doppler imaging. Endometrial stripe: Endometrial stripe is within normal limits. Right Ovary: Not seen. Left Ovary:  Not seen. Free Fluid: No evidence of free fluid. Unremarkable uterus. Unable to identify ovaries sonographically given bowel gas.      CT ABDOMEN PELVIS W IV CONTRAST Additional Contrast? Radiologist Recommendation    Result Date: 5/18/2022  EXAMINATION: CT OF THE ABDOMEN AND PELVIS WITH CONTRAST 5/18/2022 9:30 am TECHNIQUE: CT of the abdomen and pelvis was performed with the administration of intravenous contrast. Multiplanar reformatted images are provided for review. Automated exposure control, iterative reconstruction, and/or weight based adjustment of the mA/kV was utilized to reduce the radiation dose to as low as reasonably achievable. COMPARISON: None. HISTORY: ORDERING SYSTEM PROVIDED HISTORY: Diffuse abdominal pain, Noncon CT shows gas within the uterine cavity suggest an underlying fistula. TECHNOLOGIST PROVIDED HISTORY: Reason for exam:->Diffuse abdominal pain, Noncon CT shows gas within the uterine cavity suggest an underlying fistula. Additional Contrast?->Radiologist Recommendation Reason for Exam: Abdominal Pain Relevant Medical/Surgical History: 75 ML ISOVUE 370 FINDINGS: Lower Chest: There are bilateral calcified granulomas. Organs: There is mild steatosis of the liver. The gallbladder appears unremarkable. The pancreas appears normal.  There are calcified granulomas in the spleen. The adrenal glands appear unremarkable. There is a new right ureteral stent in place. Calculi are noted in the the left kidney and left renal pelvis, measuring up to 2.5 cm without hydronephrosis. GI/Bowel: The stomach, and small bowel loops appear unremarkable. There is no bowel distension. There is normal amount of fecal material in the colon. There is some gas noted within the uterus, unchanged from prior examination, suggesting some type of utero enteric fistula or infection with gas forming organisms. . Pelvis: The bladder appears unremarkable. There is fecal material in the the rectum. There is no pelvic adenopathy. Peritoneum/Retroperitoneum: Unremarkable Bones/Soft Tissues: Unremarkable     No significant change from prior examination. Mild steatosis of the liver. Calcified granulomas noted in the the lungs and spleen. New right ureteral stent in place. Calculi noted in the left kidney and left renal pelvis.  Some gas noted in the uterus, similar to prior examination, and suggesting some type of utero enteric fistula or infection with gas forming organisms. Clinical correlation required. FL LESS THAN 1 HOUR    Result Date: 5/15/2022  Radiology exam is complete. No Radiologist dictation. Please follow up with ordering provider. XR CHEST PORTABLE    Result Date: 5/15/2022  EXAMINATION: ONE XRAY VIEW OF THE CHEST 5/15/2022 2:57 am COMPARISON: 11/29/2021 HISTORY: ORDERING SYSTEM PROVIDED HISTORY: fall TECHNOLOGIST PROVIDED HISTORY: Reason for exam:->fall Reason for Exam: fall Additional signs and symptoms: fall FINDINGS: The cardiomediastinal silhouette is unchanged in appearance. Left subclavian dual chamber pacer in place. There is no consolidation, pneumothorax, or evidence of edema. No effusion is appreciated. The osseous structures are unchanged in appearance. Unchanged appearance of the chest without acute airspace disease identified. CT LUMBAR RECONSTRUCTION WO POST PROCESS    Result Date: 5/15/2022  EXAMINATION: CT OF THE CHEST WITHOUT CONTRAST; CT OF THE ABDOMEN AND PELVIS WITHOUT CONTRAST; CT OF THE LUMBAR SPINE WITHOUT CONTRAST; CT OF THE THORACIC SPINE WITHOUT CONTRAST 5/15/2022 5:51 am; 5/15/2022 5:52 am; 5/15/2022 5:53 am TECHNIQUE: CT of the chest was performed without the administration of intravenous contrast. Multiplanar reformatted images are provided for review. Automated exposure control, iterative reconstruction, and/or weight based adjustment of the mA/kV was utilized to reduce the radiation dose to as low as reasonably achievable.; CT of the abdomen and pelvis was performed without the administration of intravenous contrast. Multiplanar reformatted images are provided for review.  Automated exposure control, iterative reconstruction, and/or weight based adjustment of the mA/kV was utilized to reduce the radiation dose to as low as reasonably achievable.; CT of the lumbar spine was performed without the administration of intravenous contrast. Multiplanar reformatted images are provided for review. Adjustment of mA and/or kV according to patient size was utilized. Automated exposure control, iterative reconstruction, and/or weight based adjustment of the mA/kV was utilized to reduce the radiation dose to as low as reasonably achievable.; CT of the thoracic spine was performed without the administration of intravenous contrast. Multiplanar reformatted images are provided for review. Automated exposure control, iterative reconstruction, and/or weight based adjustment of the mA/kV was utilized to reduce the radiation dose to as low as reasonably achievable. COMPARISON: Chest radiograph today, chest CT 06/26/2019, CT abdomen and pelvis 07/28/2018 HISTORY: ORDERING SYSTEM PROVIDED HISTORY: Fall, left chest ecchymosis TECHNOLOGIST PROVIDED HISTORY: Reason for exam:->Fall, left chest ecchymosis Reason for Exam: Fall, left chest ecchymosis; ORDERING SYSTEM PROVIDED HISTORY: Diffuse abdominal pain TECHNOLOGIST PROVIDED HISTORY: Reason for exam:->Diffuse abdominal pain Additional Contrast?->None Reason for Exam: Diffuse abdominal pain; ORDERING SYSTEM PROVIDED HISTORY: fall TECHNOLOGIST PROVIDED HISTORY: Reason for exam:->fall Reason for Exam: fall; ORDERING SYSTEM PROVIDED HISTORY: fall TECHNOLOGIST PROVIDED HISTORY: Reason for exam:->fall Decision Support Exception - unselect if not a suspected or confirmed emergency medical condition->Emergency Medical Condition (MA) Reason for Exam: fall FINDINGS: Chest: Mediastinum: No mediastinal hematoma. No pericardial effusion. Left subclavian dual chamber pacer in place. Lungs/pleura: No pneumothorax or effusion. Mild respiratory motion artifact is noted without evidence for acute airspace disease. The central airway is patent. Soft Tissues/Bones: Motion artifact is noted. Slight offset of the sternal body near the sternomanubrial junction appears due to motion artifact.   No displaced fracture identified. Abdomen/Pelvis: Organs: Evaluation of the abdominal and pelvic viscera is limited in the absence of contrast.  No solid organ injury identified. An obstructing 11 mm stone is present in the mid right ureter resulting in mild hydronephrosis. Bilateral renal calculi are also present with a 1.7 cm stone in the left renal pelvis and mild caliectasis noted. GI/Bowel: There is no bowel dilatation or wall thickening identified. Moderate distal colonic stool burden. Status post partial sigmoid resection. Diverticulosis. Pelvis: Gas within the uterine cavity is noted. Curvilinear soft tissue is noted extending towards the sigmoid colon and colonic surgical clips. Peritoneum/Retroperitoneum: No free air. No free fluid. The aorta is normal in caliber. Bones/Soft Tissues: Pelvic alignment maintained. No fracture identified. No soft tissue hematoma. THORACIC: BONES/ALIGNMENT: Osteopenia. There is normal alignment of the spine. The vertebral body heights are maintained. No osseous destructive lesion is seen. DEGENERATIVE CHANGES: No gross spinal canal stenosis or bony neural foraminal narrowing of the thoracic spine. SOFT TISSUES: No paraspinal hematoma. LUMBAR: BONES/ALIGNMENT: Osteopenia. There is normal alignment of the spine. The vertebral body heights are maintained. No osseous destructive lesion is seen. DEGENERATIVE CHANGES: No gross spinal canal stenosis or bony neural foraminal narrowing of the lumbar spine. SOFT TISSUES: No paraspinal hematoma. 1.  No acute traumatic injury identified in the chest, abdomen or pelvis, within the limits of a noncontrast exam.  Motion artifact does degrade evaluation of the osseous structures in the chest however. 2.  No acute airspace disease identified. 3.  Mildly obstructing 11 mm stone in the mid right ureter. Bilateral nephrolithiasis. 4.  Gas within the uterine cavity is noted, suggestive of underlying fistula.  5.  No acute osseous abnormality identified in the thoracic or lumbar spine. CT THORACIC RECONSTRUCTION WO POST PROCESS    Result Date: 5/15/2022  EXAMINATION: CT OF THE CHEST WITHOUT CONTRAST; CT OF THE ABDOMEN AND PELVIS WITHOUT CONTRAST; CT OF THE LUMBAR SPINE WITHOUT CONTRAST; CT OF THE THORACIC SPINE WITHOUT CONTRAST 5/15/2022 5:51 am; 5/15/2022 5:52 am; 5/15/2022 5:53 am TECHNIQUE: CT of the chest was performed without the administration of intravenous contrast. Multiplanar reformatted images are provided for review. Automated exposure control, iterative reconstruction, and/or weight based adjustment of the mA/kV was utilized to reduce the radiation dose to as low as reasonably achievable.; CT of the abdomen and pelvis was performed without the administration of intravenous contrast. Multiplanar reformatted images are provided for review. Automated exposure control, iterative reconstruction, and/or weight based adjustment of the mA/kV was utilized to reduce the radiation dose to as low as reasonably achievable.; CT of the lumbar spine was performed without the administration of intravenous contrast. Multiplanar reformatted images are provided for review. Adjustment of mA and/or kV according to patient size was utilized. Automated exposure control, iterative reconstruction, and/or weight based adjustment of the mA/kV was utilized to reduce the radiation dose to as low as reasonably achievable.; CT of the thoracic spine was performed without the administration of intravenous contrast. Multiplanar reformatted images are provided for review. Automated exposure control, iterative reconstruction, and/or weight based adjustment of the mA/kV was utilized to reduce the radiation dose to as low as reasonably achievable.  COMPARISON: Chest radiograph today, chest CT 06/26/2019, CT abdomen and pelvis 07/28/2018 HISTORY: ORDERING SYSTEM PROVIDED HISTORY: Fall, left chest ecchymosis TECHNOLOGIST PROVIDED HISTORY: Reason for exam:->Fall, left chest ecchymosis Reason for Exam: Fall, left chest ecchymosis; ORDERING SYSTEM PROVIDED HISTORY: Diffuse abdominal pain TECHNOLOGIST PROVIDED HISTORY: Reason for exam:->Diffuse abdominal pain Additional Contrast?->None Reason for Exam: Diffuse abdominal pain; ORDERING SYSTEM PROVIDED HISTORY: fall TECHNOLOGIST PROVIDED HISTORY: Reason for exam:->fall Reason for Exam: fall; ORDERING SYSTEM PROVIDED HISTORY: fall TECHNOLOGIST PROVIDED HISTORY: Reason for exam:->fall Decision Support Exception - unselect if not a suspected or confirmed emergency medical condition->Emergency Medical Condition (MA) Reason for Exam: fall FINDINGS: Chest: Mediastinum: No mediastinal hematoma. No pericardial effusion. Left subclavian dual chamber pacer in place. Lungs/pleura: No pneumothorax or effusion. Mild respiratory motion artifact is noted without evidence for acute airspace disease. The central airway is patent. Soft Tissues/Bones: Motion artifact is noted. Slight offset of the sternal body near the sternomanubrial junction appears due to motion artifact. No displaced fracture identified. Abdomen/Pelvis: Organs: Evaluation of the abdominal and pelvic viscera is limited in the absence of contrast.  No solid organ injury identified. An obstructing 11 mm stone is present in the mid right ureter resulting in mild hydronephrosis. Bilateral renal calculi are also present with a 1.7 cm stone in the left renal pelvis and mild caliectasis noted. GI/Bowel: There is no bowel dilatation or wall thickening identified. Moderate distal colonic stool burden. Status post partial sigmoid resection. Diverticulosis. Pelvis: Gas within the uterine cavity is noted. Curvilinear soft tissue is noted extending towards the sigmoid colon and colonic surgical clips. Peritoneum/Retroperitoneum: No free air. No free fluid. The aorta is normal in caliber. Bones/Soft Tissues: Pelvic alignment maintained. No fracture identified. No soft tissue hematoma.  THORACIC: BONES/ALIGNMENT: Osteopenia. There is normal alignment of the spine. The vertebral body heights are maintained. No osseous destructive lesion is seen. DEGENERATIVE CHANGES: No gross spinal canal stenosis or bony neural foraminal narrowing of the thoracic spine. SOFT TISSUES: No paraspinal hematoma. LUMBAR: BONES/ALIGNMENT: Osteopenia. There is normal alignment of the spine. The vertebral body heights are maintained. No osseous destructive lesion is seen. DEGENERATIVE CHANGES: No gross spinal canal stenosis or bony neural foraminal narrowing of the lumbar spine. SOFT TISSUES: No paraspinal hematoma. 1.  No acute traumatic injury identified in the chest, abdomen or pelvis, within the limits of a noncontrast exam.  Motion artifact does degrade evaluation of the osseous structures in the chest however. 2.  No acute airspace disease identified. 3.  Mildly obstructing 11 mm stone in the mid right ureter. Bilateral nephrolithiasis. 4.  Gas within the uterine cavity is noted, suggestive of underlying fistula. 5.  No acute osseous abnormality identified in the thoracic or lumbar spine. VL DUP LOWER EXTREMITY VENOUS LEFT    Result Date: 5/16/2022  EXAMINATION: DUPLEX VENOUS ULTRASOUND OF THE LEFT LOWER EXTREMITY 5/16/2022 6:40 pm TECHNIQUE: Duplex ultrasound using B-mode/gray scaled imaging and Doppler spectral analysis and color flow was obtained of the deep venous structures of the left extremity. COMPARISON: None. HISTORY: ORDERING SYSTEM PROVIDED HISTORY: LEFT CALF TENDERNESS TECHNOLOGIST PROVIDED HISTORY: Reason for exam:->LEFT CALF TENDERNESS Reason for Exam: Calf pain FINDINGS: The visualized veins of the left lower extremity are patent and free of echogenic thrombus. The veins demonstrate good compressibility with normal color flow study and spectral analysis. No evidence of DVT in the left lower extremity. CBC: No results for input(s): WBC, HGB, PLT in the last 72 hours.   BMP:  No results for input(s): NA, K, CL, CO2, BUN, CREATININE, GLUCOSE in the last 72 hours. Hepatic: No results for input(s): AST, ALT, ALB, BILITOT, ALKPHOS in the last 72 hours. Lipids:   Lab Results   Component Value Date    CHOL 182 07/29/2021    HDL 50 07/29/2021    TRIG 185 07/29/2021     Hemoglobin A1C:   Lab Results   Component Value Date    LABA1C 5.8 11/10/2021     TSH: No results found for: TSH  Troponin:   Lab Results   Component Value Date    TROPONINT <0.010 05/17/2022    TROPONINT <0.010 05/16/2022    TROPONINT <0.010 05/16/2022     Lactic Acid: No results for input(s): LACTA in the last 72 hours. BNP: No results for input(s): PROBNP in the last 72 hours.   UA:  Lab Results   Component Value Date    NITRU POSITIVE 05/15/2022    COLORU YELLOW 05/15/2022    WBCUA 522 05/15/2022    RBCUA 19 05/15/2022    MUCUS RARE 05/15/2022    TRICHOMONAS NONE SEEN 10/16/2018    YEAST RARE 09/11/2017    BACTERIA NEGATIVE 05/15/2022    CLARITYU CLEAR 05/15/2022    SPECGRAV 1.015 05/15/2022    LEUKOCYTESUR LARGE NUMBER OR AMOUNT OF  05/15/2022    UROBILINOGEN 0.2 05/15/2022    BILIRUBINUR NEGATIVE 05/15/2022    BLOODU LARGE NUMBER OR AMOUNT OF  05/15/2022    KETUA NEGATIVE 05/15/2022    AMORPHOUS OCCASIONAL 04/15/2018     Urine Cultures: No results found for: LABURIN  Blood Cultures: No results found for: BC  No results found for: BLOODCULT2  Organism:   Lab Results   Component Value Date    ORG ECOL 10/16/2018       Time Spent Discharging patient 35 minutes    Electronically signed by Domenico Howell PA-C on 5/19/2022 at 10:19 AM 25-May-2021 00:50

## 2022-09-12 ENCOUNTER — INPATIENT (INPATIENT)
Facility: HOSPITAL | Age: 82
LOS: 0 days | Discharge: HOME | DRG: 308 | End: 2022-09-13
Attending: HOSPITALIST | Admitting: FAMILY MEDICINE
Payer: MEDICARE

## 2022-09-12 VITALS — WEIGHT: 195.11 LBS | HEIGHT: 72 IN

## 2022-09-12 DIAGNOSIS — I48.91 UNSPECIFIED ATRIAL FIBRILLATION: ICD-10-CM

## 2022-09-12 DIAGNOSIS — Z98.49 CATARACT EXTRACTION STATUS, UNSPECIFIED EYE: Chronic | ICD-10-CM

## 2022-09-12 DIAGNOSIS — Z98.89 OTHER SPECIFIED POSTPROCEDURAL STATES: Chronic | ICD-10-CM

## 2022-09-12 DIAGNOSIS — Z98.1 ARTHRODESIS STATUS: Chronic | ICD-10-CM

## 2022-09-12 DIAGNOSIS — Z95.5 PRESENCE OF CORONARY ANGIOPLASTY IMPLANT AND GRAFT: Chronic | ICD-10-CM

## 2022-09-12 LAB
ADD ON TEST-SPECIMEN IN LAB: SIGNIFICANT CHANGE UP
ALBUMIN SERPL ELPH-MCNC: 3.3 G/DL — SIGNIFICANT CHANGE UP (ref 3.3–5)
ALP SERPL-CCNC: 76 U/L — SIGNIFICANT CHANGE UP (ref 40–120)
ALT FLD-CCNC: 23 U/L — SIGNIFICANT CHANGE UP (ref 12–78)
ANION GAP SERPL CALC-SCNC: 3 MMOL/L — LOW (ref 5–17)
APTT BLD: 27 SEC — LOW (ref 27.5–35.5)
AST SERPL-CCNC: 10 U/L — LOW (ref 15–37)
BASOPHILS # BLD AUTO: 0.06 K/UL — SIGNIFICANT CHANGE UP (ref 0–0.2)
BASOPHILS NFR BLD AUTO: 0.7 % — SIGNIFICANT CHANGE UP (ref 0–2)
BILIRUB SERPL-MCNC: 0.4 MG/DL — SIGNIFICANT CHANGE UP (ref 0.2–1.2)
BUN SERPL-MCNC: 25 MG/DL — HIGH (ref 7–23)
CALCIUM SERPL-MCNC: 9.4 MG/DL — SIGNIFICANT CHANGE UP (ref 8.5–10.1)
CHLORIDE SERPL-SCNC: 108 MMOL/L — SIGNIFICANT CHANGE UP (ref 96–108)
CO2 SERPL-SCNC: 28 MMOL/L — SIGNIFICANT CHANGE UP (ref 22–31)
CREAT SERPL-MCNC: 1.27 MG/DL — SIGNIFICANT CHANGE UP (ref 0.5–1.3)
EGFR: 57 ML/MIN/1.73M2 — LOW
EOSINOPHIL # BLD AUTO: 0.05 K/UL — SIGNIFICANT CHANGE UP (ref 0–0.5)
EOSINOPHIL NFR BLD AUTO: 0.5 % — SIGNIFICANT CHANGE UP (ref 0–6)
GLUCOSE SERPL-MCNC: 233 MG/DL — HIGH (ref 70–99)
HCT VFR BLD CALC: 47.6 % — SIGNIFICANT CHANGE UP (ref 39–50)
HGB BLD-MCNC: 15.8 G/DL — SIGNIFICANT CHANGE UP (ref 13–17)
IMM GRANULOCYTES NFR BLD AUTO: 1.1 % — SIGNIFICANT CHANGE UP (ref 0–1.5)
INR BLD: 0.97 RATIO — SIGNIFICANT CHANGE UP (ref 0.88–1.16)
LYMPHOCYTES # BLD AUTO: 2.24 K/UL — SIGNIFICANT CHANGE UP (ref 1–3.3)
LYMPHOCYTES # BLD AUTO: 24.3 % — SIGNIFICANT CHANGE UP (ref 13–44)
MAGNESIUM SERPL-MCNC: 1.9 MG/DL — SIGNIFICANT CHANGE UP (ref 1.6–2.6)
MCHC RBC-ENTMCNC: 30 PG — SIGNIFICANT CHANGE UP (ref 27–34)
MCHC RBC-ENTMCNC: 33.2 GM/DL — SIGNIFICANT CHANGE UP (ref 32–36)
MCV RBC AUTO: 90.5 FL — SIGNIFICANT CHANGE UP (ref 80–100)
MONOCYTES # BLD AUTO: 0.72 K/UL — SIGNIFICANT CHANGE UP (ref 0–0.9)
MONOCYTES NFR BLD AUTO: 7.8 % — SIGNIFICANT CHANGE UP (ref 2–14)
NEUTROPHILS # BLD AUTO: 6.03 K/UL — SIGNIFICANT CHANGE UP (ref 1.8–7.4)
NEUTROPHILS NFR BLD AUTO: 65.6 % — SIGNIFICANT CHANGE UP (ref 43–77)
NT-PROBNP SERPL-SCNC: 500 PG/ML — HIGH (ref 0–450)
PLATELET # BLD AUTO: 186 K/UL — SIGNIFICANT CHANGE UP (ref 150–400)
POTASSIUM SERPL-MCNC: 4.6 MMOL/L — SIGNIFICANT CHANGE UP (ref 3.5–5.3)
POTASSIUM SERPL-SCNC: 4.6 MMOL/L — SIGNIFICANT CHANGE UP (ref 3.5–5.3)
PROT SERPL-MCNC: 7.4 GM/DL — SIGNIFICANT CHANGE UP (ref 6–8.3)
PROTHROM AB SERPL-ACNC: 11.3 SEC — SIGNIFICANT CHANGE UP (ref 10.5–13.4)
RBC # BLD: 5.26 M/UL — SIGNIFICANT CHANGE UP (ref 4.2–5.8)
RBC # FLD: 14.6 % — HIGH (ref 10.3–14.5)
SODIUM SERPL-SCNC: 139 MMOL/L — SIGNIFICANT CHANGE UP (ref 135–145)
TROPONIN I, HIGH SENSITIVITY RESULT: 22.09 NG/L — SIGNIFICANT CHANGE UP
TROPONIN I, HIGH SENSITIVITY RESULT: 54.48 NG/L — SIGNIFICANT CHANGE UP
TSH SERPL-MCNC: 1.69 UU/ML — SIGNIFICANT CHANGE UP (ref 0.34–4.82)
WBC # BLD: 9.2 K/UL — SIGNIFICANT CHANGE UP (ref 3.8–10.5)
WBC # FLD AUTO: 9.2 K/UL — SIGNIFICANT CHANGE UP (ref 3.8–10.5)

## 2022-09-12 PROCEDURE — 97162 PT EVAL MOD COMPLEX 30 MIN: CPT | Mod: GP

## 2022-09-12 PROCEDURE — 71045 X-RAY EXAM CHEST 1 VIEW: CPT | Mod: 26

## 2022-09-12 PROCEDURE — 83735 ASSAY OF MAGNESIUM: CPT

## 2022-09-12 PROCEDURE — 84484 ASSAY OF TROPONIN QUANT: CPT

## 2022-09-12 PROCEDURE — 80053 COMPREHEN METABOLIC PANEL: CPT

## 2022-09-12 PROCEDURE — 84100 ASSAY OF PHOSPHORUS: CPT

## 2022-09-12 PROCEDURE — 82962 GLUCOSE BLOOD TEST: CPT

## 2022-09-12 PROCEDURE — 83036 HEMOGLOBIN GLYCOSYLATED A1C: CPT

## 2022-09-12 PROCEDURE — 85025 COMPLETE CBC W/AUTO DIFF WBC: CPT

## 2022-09-12 PROCEDURE — 93010 ELECTROCARDIOGRAM REPORT: CPT

## 2022-09-12 PROCEDURE — 97116 GAIT TRAINING THERAPY: CPT | Mod: GP

## 2022-09-12 PROCEDURE — 99223 1ST HOSP IP/OBS HIGH 75: CPT

## 2022-09-12 PROCEDURE — 36415 COLL VENOUS BLD VENIPUNCTURE: CPT

## 2022-09-12 PROCEDURE — 93005 ELECTROCARDIOGRAM TRACING: CPT

## 2022-09-12 PROCEDURE — 85610 PROTHROMBIN TIME: CPT

## 2022-09-12 PROCEDURE — 99285 EMERGENCY DEPT VISIT HI MDM: CPT | Mod: FS

## 2022-09-12 RX ORDER — DILTIAZEM HCL 120 MG
60 CAPSULE, EXT RELEASE 24 HR ORAL ONCE
Refills: 0 | Status: COMPLETED | OUTPATIENT
Start: 2022-09-12 | End: 2022-09-12

## 2022-09-12 RX ORDER — ALBUTEROL 90 UG/1
2 AEROSOL, METERED ORAL EVERY 6 HOURS
Refills: 0 | Status: DISCONTINUED | OUTPATIENT
Start: 2022-09-12 | End: 2022-09-13

## 2022-09-12 RX ORDER — DEXTROSE 50 % IN WATER 50 %
25 SYRINGE (ML) INTRAVENOUS ONCE
Refills: 0 | Status: DISCONTINUED | OUTPATIENT
Start: 2022-09-12 | End: 2022-09-13

## 2022-09-12 RX ORDER — METOPROLOL TARTRATE 50 MG
200 TABLET ORAL DAILY
Refills: 0 | Status: DISCONTINUED | OUTPATIENT
Start: 2022-09-12 | End: 2022-09-13

## 2022-09-12 RX ORDER — LISINOPRIL 2.5 MG/1
40 TABLET ORAL DAILY
Refills: 0 | Status: DISCONTINUED | OUTPATIENT
Start: 2022-09-12 | End: 2022-09-13

## 2022-09-12 RX ORDER — DEXTROSE 50 % IN WATER 50 %
12.5 SYRINGE (ML) INTRAVENOUS ONCE
Refills: 0 | Status: DISCONTINUED | OUTPATIENT
Start: 2022-09-12 | End: 2022-09-13

## 2022-09-12 RX ORDER — DOXAZOSIN MESYLATE 4 MG
4 TABLET ORAL DAILY
Refills: 0 | Status: DISCONTINUED | OUTPATIENT
Start: 2022-09-12 | End: 2022-09-13

## 2022-09-12 RX ORDER — DILTIAZEM HCL 120 MG
10 CAPSULE, EXT RELEASE 24 HR ORAL ONCE
Refills: 0 | Status: COMPLETED | OUTPATIENT
Start: 2022-09-12 | End: 2022-09-12

## 2022-09-12 RX ORDER — INSULIN LISPRO 100/ML
VIAL (ML) SUBCUTANEOUS
Refills: 0 | Status: DISCONTINUED | OUTPATIENT
Start: 2022-09-12 | End: 2022-09-13

## 2022-09-12 RX ORDER — DEXTROSE 50 % IN WATER 50 %
15 SYRINGE (ML) INTRAVENOUS ONCE
Refills: 0 | Status: DISCONTINUED | OUTPATIENT
Start: 2022-09-12 | End: 2022-09-13

## 2022-09-12 RX ORDER — AMLODIPINE BESYLATE 2.5 MG/1
1 TABLET ORAL
Qty: 0 | Refills: 0 | DISCHARGE

## 2022-09-12 RX ORDER — ACETAMINOPHEN 500 MG
650 TABLET ORAL EVERY 6 HOURS
Refills: 0 | Status: DISCONTINUED | OUTPATIENT
Start: 2022-09-12 | End: 2022-09-13

## 2022-09-12 RX ORDER — INFLUENZA VIRUS VACCINE 15; 15; 15; 15 UG/.5ML; UG/.5ML; UG/.5ML; UG/.5ML
0.7 SUSPENSION INTRAMUSCULAR ONCE
Refills: 0 | Status: DISCONTINUED | OUTPATIENT
Start: 2022-09-12 | End: 2022-09-13

## 2022-09-12 RX ORDER — TRAMADOL HYDROCHLORIDE 50 MG/1
1 TABLET ORAL
Qty: 0 | Refills: 0 | DISCHARGE

## 2022-09-12 RX ORDER — GLUCAGON INJECTION, SOLUTION 0.5 MG/.1ML
1 INJECTION, SOLUTION SUBCUTANEOUS ONCE
Refills: 0 | Status: DISCONTINUED | OUTPATIENT
Start: 2022-09-12 | End: 2022-09-13

## 2022-09-12 RX ORDER — ONDANSETRON 8 MG/1
4 TABLET, FILM COATED ORAL EVERY 8 HOURS
Refills: 0 | Status: DISCONTINUED | OUTPATIENT
Start: 2022-09-12 | End: 2022-09-13

## 2022-09-12 RX ORDER — ENOXAPARIN SODIUM 100 MG/ML
80 INJECTION SUBCUTANEOUS ONCE
Refills: 0 | Status: COMPLETED | OUTPATIENT
Start: 2022-09-12 | End: 2022-09-12

## 2022-09-12 RX ORDER — FINASTERIDE 5 MG/1
5 TABLET, FILM COATED ORAL DAILY
Refills: 0 | Status: DISCONTINUED | OUTPATIENT
Start: 2022-09-12 | End: 2022-09-13

## 2022-09-12 RX ORDER — ALBUTEROL 90 UG/1
2 AEROSOL, METERED ORAL
Qty: 0 | Refills: 0 | DISCHARGE

## 2022-09-12 RX ORDER — ATORVASTATIN CALCIUM 80 MG/1
40 TABLET, FILM COATED ORAL DAILY
Refills: 0 | Status: DISCONTINUED | OUTPATIENT
Start: 2022-09-12 | End: 2022-09-13

## 2022-09-12 RX ORDER — METOPROLOL TARTRATE 50 MG
5 TABLET ORAL EVERY 6 HOURS
Refills: 0 | Status: DISCONTINUED | OUTPATIENT
Start: 2022-09-12 | End: 2022-09-13

## 2022-09-12 RX ORDER — SODIUM CHLORIDE 9 MG/ML
1000 INJECTION, SOLUTION INTRAVENOUS
Refills: 0 | Status: DISCONTINUED | OUTPATIENT
Start: 2022-09-12 | End: 2022-09-13

## 2022-09-12 RX ORDER — ASPIRIN/CALCIUM CARB/MAGNESIUM 324 MG
81 TABLET ORAL DAILY
Refills: 0 | Status: DISCONTINUED | OUTPATIENT
Start: 2022-09-12 | End: 2022-09-13

## 2022-09-12 RX ORDER — TRAMADOL HYDROCHLORIDE 50 MG/1
50 TABLET ORAL EVERY 6 HOURS
Refills: 0 | Status: DISCONTINUED | OUTPATIENT
Start: 2022-09-12 | End: 2022-09-13

## 2022-09-12 RX ORDER — DOXAZOSIN MESYLATE 4 MG
4 TABLET ORAL DAILY
Refills: 0 | Status: DISCONTINUED | OUTPATIENT
Start: 2022-09-12 | End: 2022-09-12

## 2022-09-12 RX ORDER — LANOLIN ALCOHOL/MO/W.PET/CERES
3 CREAM (GRAM) TOPICAL AT BEDTIME
Refills: 0 | Status: DISCONTINUED | OUTPATIENT
Start: 2022-09-12 | End: 2022-09-13

## 2022-09-12 RX ADMIN — ENOXAPARIN SODIUM 80 MILLIGRAM(S): 100 INJECTION SUBCUTANEOUS at 17:01

## 2022-09-12 RX ADMIN — Medication 60 MILLIGRAM(S): at 15:37

## 2022-09-12 RX ADMIN — Medication 5 MILLIGRAM(S): at 21:53

## 2022-09-12 RX ADMIN — Medication 10 MILLIGRAM(S): at 14:54

## 2022-09-12 RX ADMIN — Medication 10 MILLIGRAM(S): at 17:00

## 2022-09-12 NOTE — ED PROVIDER NOTE - NS ED ATTENDING STATEMENT MOD
This was a shared visit with the VALERI. I reviewed and verified the documentation and independently performed the documented:

## 2022-09-12 NOTE — ED ADULT NURSE NOTE - OBJECTIVE STATEMENT
Pt arrives to ED complaining of chest pain. pt states pain started this morning. Describes as "tightness." Hx CAD,

## 2022-09-12 NOTE — PATIENT PROFILE ADULT - FALL HARM RISK - HARM RISK INTERVENTIONS

## 2022-09-12 NOTE — ED PROVIDER NOTE - PROGRESS NOTE DETAILS
PA: PA: Pt seen and evaluated. Will treat for AFIB, labs, reassess. Likely TBA. ~Sachin Saleem PA-C Spoke with Cardio, wants admission, Lovenox. Spoke with hospitalist dr. Lopez, will admit patient. ~Sachin Saleem PA-C

## 2022-09-12 NOTE — H&P ADULT - HISTORY OF PRESENT ILLNESS
81 year old male patient with a pertinent past medical history noted for CAD, HTN, HLD and DM2 presented to the ED complaining of a sudden onset of shortness of breath. Of note, patient endorses waking up and experiencing dyspnea within 20 minutes. Dyspnea associated with a left sided chest tightness that radiated to the left arm. Patient without recent fever, cough, abdominal pain, dizziness or pre-syncope. He has been compliant with his maintenance medication regimen. Patient also with nausea.        In the ED patient found to be in new onset Afib at a rate of 146. He was given Cardizem 10 mg x 2 and a therapeutic dose of Lovenox.

## 2022-09-12 NOTE — ED PROVIDER NOTE - CLINICAL SUMMARY MEDICAL DECISION MAKING FREE TEXT BOX
PA: Pt with sob and mild cp.  Noted to have afib with rvr.  No infectious symptoms.  NO risk factors for pe, but will check dimer.  Symptoms and HR improved with dilt.  Will require admission for further w/u and management. Pt with sob and mild cp.  Noted to have afib with rvr.  No infectious symptoms.  NO risk factors for pe, but will check dimer.  Symptoms and HR improved with dilt.  Will require admission for further w/u and management.    Spoke with Cardio, wants admission, Lovenox. Spoke with hospitalist dr. Lopez, will admit patient. ~Sachin Saleem PA-C

## 2022-09-12 NOTE — ED PROVIDER NOTE - NOTES
~Sachin Saleem PA-C Dr. Grijalva wants patient admitted, start Lovenox, will cardiovert in am. ~Sachin Saleem PA-C

## 2022-09-12 NOTE — ED PROVIDER NOTE - PHYSICAL EXAMINATION
PA NOTE: GEN: AOX3, NAD. HEENT: Throat clear. Airway is patent. EYES: PERRLA. EOMI. Head: NC/AT. NECK: Supple, No JVD. FROM. C-spine non-tender. CV: +AFib on monitor, . S1S2, Rhythm is irregular. LUNGS: Non-labored breathing, no tachypnea. O2sat 100% RA. CTA b/l. No w/r/r. CHEST: Equal chest expansion and rise. No deformity. ABD: Soft, NT/ND, no rebound, no guarding. No CVAT. EXT: No e/c/c. 2+ distal pulses. SKIN: No rashes. NEURO: No focal deficits. CN II-XII intact. FROM. 5/5 motor and sensory. ~Sachin Saleem PA-C

## 2022-09-12 NOTE — H&P ADULT - ASSESSMENT
81 year old male patient with acute respiratory failure in the setting of new onset Atrial Fibrillation          -Admit to Tele        # New onset Atrial Fibrillation  -[id number] of 5  -pt given therapeutic dose of lovenox in the ED  -get morning echo  -will add IV BB for rate control  -Cardiology to evaluate pt for likely cardioversion in the morning    # Acute Respiratory Failure  -likely secondary to above  -no hypoxia noted    # DM2  -hold oral hypoglycemics  -ISS    # HTN  -on ace-inhibitor    # HLD  -on statin    # CAD  -on asa, statin, BB    # DVT ppx  -given therapeutic dose of lovenox

## 2022-09-12 NOTE — ED PROVIDER NOTE - OBJECTIVE STATEMENT
PA: PA: Patient is an 81 year old male with PMHx of HTN, COPD, DM, HLD, BPH, who presents to ED c/o rapid heartbeats, mild CP and SOB. DENIES cough, fever. ~Sachin Saleem PA-C

## 2022-09-12 NOTE — PROVIDER CONTACT NOTE (OTHER) - ACTION/TREATMENT ORDERED:
MD Machado aware, per MD patients symptoms does not relate to PE but his new Afib and may need cardioversion.

## 2022-09-12 NOTE — H&P ADULT - NSHPPHYSICALEXAM_GEN_ALL_CORE
Vital Signs Last 24 Hrs  T(C): 36.5 (12 Sep 2022 14:20), Max: 36.5 (12 Sep 2022 14:20)  T(F): 97.7 (12 Sep 2022 14:20), Max: 97.7 (12 Sep 2022 14:20)  HR: 102 (12 Sep 2022 15:40) (102 - 134)  BP: 107/83 (12 Sep 2022 15:40) (107/83 - 121/67)  BP(mean): 90 (12 Sep 2022 15:40) (90 - 98)  RR: 17 (12 Sep 2022 15:40) (17 - 17)  SpO2: 98% (12 Sep 2022 15:40) (98% - 99%)    Parameters below as of 12 Sep 2022 15:40  Patient On (Oxygen Delivery Method): room air

## 2022-09-13 VITALS — SYSTOLIC BLOOD PRESSURE: 120 MMHG | DIASTOLIC BLOOD PRESSURE: 65 MMHG | HEART RATE: 79 BPM

## 2022-09-13 LAB
A1C WITH ESTIMATED AVERAGE GLUCOSE RESULT: 7 % — HIGH (ref 4–5.6)
ALBUMIN SERPL ELPH-MCNC: 2.8 G/DL — LOW (ref 3.3–5)
ALP SERPL-CCNC: 68 U/L — SIGNIFICANT CHANGE UP (ref 40–120)
ALT FLD-CCNC: 20 U/L — SIGNIFICANT CHANGE UP (ref 12–78)
ANION GAP SERPL CALC-SCNC: 5 MMOL/L — SIGNIFICANT CHANGE UP (ref 5–17)
AST SERPL-CCNC: 12 U/L — LOW (ref 15–37)
BASOPHILS # BLD AUTO: 0.05 K/UL — SIGNIFICANT CHANGE UP (ref 0–0.2)
BASOPHILS NFR BLD AUTO: 0.6 % — SIGNIFICANT CHANGE UP (ref 0–2)
BILIRUB SERPL-MCNC: 0.4 MG/DL — SIGNIFICANT CHANGE UP (ref 0.2–1.2)
BUN SERPL-MCNC: 26 MG/DL — HIGH (ref 7–23)
CALCIUM SERPL-MCNC: 8.9 MG/DL — SIGNIFICANT CHANGE UP (ref 8.5–10.1)
CHLORIDE SERPL-SCNC: 111 MMOL/L — HIGH (ref 96–108)
CO2 SERPL-SCNC: 23 MMOL/L — SIGNIFICANT CHANGE UP (ref 22–31)
CREAT SERPL-MCNC: 1.11 MG/DL — SIGNIFICANT CHANGE UP (ref 0.5–1.3)
EGFR: 67 ML/MIN/1.73M2 — SIGNIFICANT CHANGE UP
EOSINOPHIL # BLD AUTO: 0.05 K/UL — SIGNIFICANT CHANGE UP (ref 0–0.5)
EOSINOPHIL NFR BLD AUTO: 0.6 % — SIGNIFICANT CHANGE UP (ref 0–6)
ESTIMATED AVERAGE GLUCOSE: 154 MG/DL — HIGH (ref 68–114)
GLUCOSE SERPL-MCNC: 194 MG/DL — HIGH (ref 70–99)
HCT VFR BLD CALC: 43.5 % — SIGNIFICANT CHANGE UP (ref 39–50)
HGB BLD-MCNC: 14.5 G/DL — SIGNIFICANT CHANGE UP (ref 13–17)
IMM GRANULOCYTES NFR BLD AUTO: 0.8 % — SIGNIFICANT CHANGE UP (ref 0–1.5)
INR BLD: 1.05 RATIO — SIGNIFICANT CHANGE UP (ref 0.88–1.16)
LYMPHOCYTES # BLD AUTO: 2.11 K/UL — SIGNIFICANT CHANGE UP (ref 1–3.3)
LYMPHOCYTES # BLD AUTO: 25.6 % — SIGNIFICANT CHANGE UP (ref 13–44)
MAGNESIUM SERPL-MCNC: 1.9 MG/DL — SIGNIFICANT CHANGE UP (ref 1.6–2.6)
MCHC RBC-ENTMCNC: 29.8 PG — SIGNIFICANT CHANGE UP (ref 27–34)
MCHC RBC-ENTMCNC: 33.3 GM/DL — SIGNIFICANT CHANGE UP (ref 32–36)
MCV RBC AUTO: 89.3 FL — SIGNIFICANT CHANGE UP (ref 80–100)
MONOCYTES # BLD AUTO: 0.73 K/UL — SIGNIFICANT CHANGE UP (ref 0–0.9)
MONOCYTES NFR BLD AUTO: 8.9 % — SIGNIFICANT CHANGE UP (ref 2–14)
NEUTROPHILS # BLD AUTO: 5.23 K/UL — SIGNIFICANT CHANGE UP (ref 1.8–7.4)
NEUTROPHILS NFR BLD AUTO: 63.5 % — SIGNIFICANT CHANGE UP (ref 43–77)
PHOSPHATE SERPL-MCNC: 2.8 MG/DL — SIGNIFICANT CHANGE UP (ref 2.5–4.5)
PLATELET # BLD AUTO: 144 K/UL — LOW (ref 150–400)
POTASSIUM SERPL-MCNC: 4.4 MMOL/L — SIGNIFICANT CHANGE UP (ref 3.5–5.3)
POTASSIUM SERPL-SCNC: 4.4 MMOL/L — SIGNIFICANT CHANGE UP (ref 3.5–5.3)
PROT SERPL-MCNC: 6.3 GM/DL — SIGNIFICANT CHANGE UP (ref 6–8.3)
PROTHROM AB SERPL-ACNC: 12.2 SEC — SIGNIFICANT CHANGE UP (ref 10.5–13.4)
RBC # BLD: 4.87 M/UL — SIGNIFICANT CHANGE UP (ref 4.2–5.8)
RBC # FLD: 14.4 % — SIGNIFICANT CHANGE UP (ref 10.3–14.5)
SODIUM SERPL-SCNC: 139 MMOL/L — SIGNIFICANT CHANGE UP (ref 135–145)
TROPONIN I, HIGH SENSITIVITY RESULT: 49.77 NG/L — SIGNIFICANT CHANGE UP
WBC # BLD: 8.24 K/UL — SIGNIFICANT CHANGE UP (ref 3.8–10.5)
WBC # FLD AUTO: 8.24 K/UL — SIGNIFICANT CHANGE UP (ref 3.8–10.5)

## 2022-09-13 PROCEDURE — 99239 HOSP IP/OBS DSCHRG MGMT >30: CPT

## 2022-09-13 PROCEDURE — 93010 ELECTROCARDIOGRAM REPORT: CPT

## 2022-09-13 RX ORDER — APIXABAN 2.5 MG/1
1 TABLET, FILM COATED ORAL
Qty: 60 | Refills: 0
Start: 2022-09-13 | End: 2022-10-12

## 2022-09-13 RX ORDER — AMIODARONE HYDROCHLORIDE 400 MG/1
200 TABLET ORAL DAILY
Refills: 0 | Status: DISCONTINUED | OUTPATIENT
Start: 2022-09-13 | End: 2022-09-13

## 2022-09-13 RX ORDER — APIXABAN 2.5 MG/1
5 TABLET, FILM COATED ORAL
Refills: 0 | Status: DISCONTINUED | OUTPATIENT
Start: 2022-09-13 | End: 2022-09-13

## 2022-09-13 RX ORDER — APIXABAN 2.5 MG/1
1 TABLET, FILM COATED ORAL
Qty: 60 | Refills: 0 | DISCHARGE
Start: 2022-09-13 | End: 2022-10-12

## 2022-09-13 RX ORDER — AMIODARONE HYDROCHLORIDE 400 MG/1
1 TABLET ORAL
Qty: 30 | Refills: 0
Start: 2022-09-13 | End: 2022-10-12

## 2022-09-13 RX ADMIN — APIXABAN 5 MILLIGRAM(S): 2.5 TABLET, FILM COATED ORAL at 11:22

## 2022-09-13 RX ADMIN — AMIODARONE HYDROCHLORIDE 200 MILLIGRAM(S): 400 TABLET ORAL at 11:22

## 2022-09-13 RX ADMIN — Medication 200 MILLIGRAM(S): at 11:24

## 2022-09-13 RX ADMIN — LISINOPRIL 40 MILLIGRAM(S): 2.5 TABLET ORAL at 12:33

## 2022-09-13 RX ADMIN — FINASTERIDE 5 MILLIGRAM(S): 5 TABLET, FILM COATED ORAL at 11:24

## 2022-09-13 RX ADMIN — Medication 81 MILLIGRAM(S): at 11:22

## 2022-09-13 RX ADMIN — Medication 3: at 11:53

## 2022-09-13 RX ADMIN — Medication 4 MILLIGRAM(S): at 12:33

## 2022-09-13 NOTE — CONSULT NOTE ADULT - SUBJECTIVE AND OBJECTIVE BOX
Patient is a 81y old  Male who presents with a chief complaint of New Onset Atrial Fibrillation  Acute Respiratory failure (12 Sep 2022 16:51)    ________________________________  ELYSSA GOMEZ is a 81y year old Male with a past medical history of coronary disease status post coronary angiogram in 2016 at which time he underwent PCI to OM 2, type 2 diabetes, hypertension, hyperlipidemia, history of COVID-19, who was admitted for shortness of breath.  On arrival he was noted to be in A. fib with RVR which was a new diagnosis with a rate of 146 bpm.  The patient spontaneous converted back to sinus rhythm overnight.  He got 1 dose of Lovenox for anticoagulation.  Currently he is asymptomatic.  He has some chest tightness which resolved after he converted back to sinus rhythm.  Cardiac enzymes negative.    PREVIOUS CARDIAC WORKUP:    Echocardiogram 6/28/2022  --There is mild left atrial dilatation (LA volume index 35 ml/m²).  --There is moderate left ventricular hypertrophy.  --LV global wall motion is normal.  --LV ejection fraction (55 %) is normal.  --Left ventricular diastolic dysfunction with elevated left atrial pressure.  --The global LV longitudinal strain using the speckle tracking technology is -15.0 %.  --The aortic root is normal in size (3.00 cm). Ascending aorta is normal in size; its diameter is 3.40 cm (1.6 cm/m²).  --There is mild aortic valve thickening. The aortic valve is mildly calcified. There is mild   aortic stenosis. The aortic valve area, by the continuity equation, is 1.63 cm².  --There is mitral annular calcification. There is mild mitral regurgitation.  --There is mild tricuspid regurgitation.  --There is trace pulmonic regurgitation.  --The right atrial pressure is 6 - 10 mm Hg. There is mild pulmonary hypertension. RVSP 43 mmHg.  --There is no pericardial effusion.    Stress Test 8/23/2022  Rest myocardial perfusion gated SPECT imaging was performed, showing a left ventricular ejection fraction of 58 %,  Post stress resting myocardial perfusion gated SPECT imaging was performed, showing a left ventricular ejection fraction of 60 %,  There are no fixed or reversible myocardial perfusion defects seen on stress or rest imaging. No ischemia or infarction seen.  Rest gated analysis showed normal left ventricular function with normal left ventricle size  Post stress analysis showed normal left ventricular function with normal left ventricle size  Gated wall motion analysis shows normal wall motion.  No ECG evidence of ischemia after administration of IV Regadenoson.  SPECT results are normal.    ________________________________  Review of systems: A 10 point review of system has been performed, and is negative except for what has been mentioned in the above history of present illness.     PAST MEDICAL & SURGICAL HISTORY:  Type 2 diabetes mellitus      Hypertension      Hyperlipidemia      Renal calculus      Renal cyst      CAD (coronary artery disease)      Aortic stenosis, mild  2008      BPH (benign prostatic hypertrophy)      Spinal stenosis of lumbar region  s/p ZHOU x 2      Stented coronary artery  2/28/08 OM2 x 1; 3/13/2008 x 3 stents      S/P T&amp;A (status post tonsillectomy and adenoidectomy)      H/O lithotripsy  2013      S/P cataract surgery  bilaterally      S/P lumbar spinal fusion        FAMILY HISTORY:  FH: bladder cancer (Father)      SOCIAL HISTORY: The patient denies any tobacco abuse, alcohol abuse or illicit drug use.    ALLERGIES:  No Known Allergies    Home Medications:  Aspirin Enteric Coated 81 mg oral delayed release tablet: 1 tab(s) orally once a day (12 Sep 2022 18:03)  atorvastatin 40 mg oral tablet: 1 tab(s) orally once a day (12 Sep 2022 18:03)  doxazosin 4 mg oral tablet: 1 tab(s) orally once a day (12 Sep 2022 18:03)  finasteride 5 mg oral tablet: 1 tab(s) orally once a day (12 Sep 2022 18:03)  glimepiride 1 mg oral tablet: 1 tab(s) orally once a day (12 Sep 2022 18:03)  hydroCHLOROthiazide 12.5 mg oral tablet: 1 tab(s) orally every other day (12 Sep 2022 18:03)  Janumet 50 mg-1000 mg oral tablet: 1 tab(s) orally 2 times a day (12 Sep 2022 18:03)  metoprolol succinate 200 mg oral tablet, extended release: 1 tab(s) orally once a day (in the evening) (12 Sep 2022 18:03)  Multiple Vitamins oral tablet: 1 tab(s) orally once a day (12 Sep 2022 18:03)  ramipril 10 mg oral capsule: 1 cap(s) orally once a day (12 Sep 2022 18:03)    MEDICATIONS  (STANDING):  aMIOdarone    Tablet 200 milliGRAM(s) Oral daily  apixaban 5 milliGRAM(s) Oral two times a day  aspirin enteric coated 81 milliGRAM(s) Oral daily  atorvastatin Oral Tab/Cap - Peds 40 milliGRAM(s) Oral daily  dextrose 5%. 1000 milliLiter(s) (50 mL/Hr) IV Continuous <Continuous>  dextrose 5%. 1000 milliLiter(s) (100 mL/Hr) IV Continuous <Continuous>  dextrose 50% Injectable 25 Gram(s) IV Push once  dextrose 50% Injectable 12.5 Gram(s) IV Push once  dextrose 50% Injectable 25 Gram(s) IV Push once  doxazosin 4 milliGRAM(s) Oral daily  finasteride 5 milliGRAM(s) Oral daily  glucagon  Injectable 1 milliGRAM(s) IntraMuscular once  influenza  Vaccine (HIGH DOSE) 0.7 milliLiter(s) IntraMuscular once  insulin lispro (ADMELOG) corrective regimen sliding scale   SubCutaneous three times a day before meals  lisinopril 40 milliGRAM(s) Oral daily  metoprolol succinate  milliGRAM(s) Oral daily    MEDICATIONS  (PRN):  acetaminophen     Tablet .. 650 milliGRAM(s) Oral every 6 hours PRN Temp greater or equal to 38C (100.4F), Mild Pain (1 - 3)  ALBUTerol    90 MICROgram(s) HFA Inhaler 2 Puff(s) Inhalation every 6 hours PRN Shortness of Breath and/or Wheezing  aluminum hydroxide/magnesium hydroxide/simethicone Suspension 30 milliLiter(s) Oral every 4 hours PRN Dyspepsia  dextrose Oral Gel 15 Gram(s) Oral once PRN Blood Glucose LESS THAN 70 milliGRAM(s)/deciliter  melatonin 3 milliGRAM(s) Oral at bedtime PRN Insomnia  metoprolol tartrate Injectable 5 milliGRAM(s) IV Push every 6 hours PRN sustained heart rate above 130  ondansetron Injectable 4 milliGRAM(s) IV Push every 8 hours PRN Nausea and/or Vomiting  traMADol 50 milliGRAM(s) Oral every 6 hours PRN for moderate pain    Vital Signs Last 24 Hrs  T(C): 36.5 (13 Sep 2022 08:39), Max: 36.9 (12 Sep 2022 18:15)  T(F): 97.7 (13 Sep 2022 08:39), Max: 98.5 (12 Sep 2022 18:15)  HR: 64 (13 Sep 2022 08:39) (64 - 134)  BP: 113/61 (13 Sep 2022 08:39) (105/74 - 136/89)  BP(mean): 90 (12 Sep 2022 15:40) (90 - 98)  RR: 18 (13 Sep 2022 08:39) (17 - 18)  SpO2: 98% (13 Sep 2022 08:39) (97% - 99%)    Parameters below as of 13 Sep 2022 08:39  Patient On (Oxygen Delivery Method): room air    I&O's Summary  ________________________________  GENERAL APPEARANCE:  No acute distress  HEAD: normocephalic, atraumatic  NECK: supple, no jugular venous distention, no carotid bruit  HEART: Regular rate and rhythm, S1, S2 normal, 1/6 murmur  CHEST:  No anterior chest wall tenderness  LUNGS:  Clear to auscultation, without any wheezing, rhonchi or rales  ABDOMEN: soft, nontender, nondistended, with positive bowel sounds appreciated  EXTREMITIES: no edema.   NEURO: Alert and oriented x3  PSYC:  Normal affect  SKIN:  Dry  ________________________________   TELEMETRY: Sinus rhythm, with A. fib overnight    ECG: Atrial fibrillation with RVR at a rate of 146 bpm, nonspecific changes    LABS:                        14.5   8.24  )-----------( 144      ( 13 Sep 2022 07:02 )             43.5             09-13    139  |  111<H>  |  26<H>  ----------------------------<  194<H>  4.4   |  23  |  1.11    Ca    8.9      13 Sep 2022 07:02  Phos  2.8     09-13  Mg     1.9     09-13    TPro  6.3  /  Alb  2.8<L>  /  TBili  0.4  /  DBili  x   /  AST  12<L>  /  ALT  20  /  AlkPhos  68  09-13      LIVER FUNCTIONS - ( 13 Sep 2022 07:02 )  Alb: 2.8 g/dL / Pro: 6.3 gm/dL / ALK PHOS: 68 U/L / ALT: 20 U/L / AST: 12 U/L / GGT: x         PT/INR - ( 13 Sep 2022 07:02 )   PT: 12.2 sec;   INR: 1.05 ratio         PTT - ( 12 Sep 2022 14:44 )  PTT:27.0 sec    Pro BNP  500 09-12 @ 14:44  D Dimer  914 09-12 @ 14:44    PT/INR - ( 13 Sep 2022 07:02 )   PT: 12.2 sec;   INR: 1.05 ratio         PTT - ( 12 Sep 2022 14:44 )  PTT:27.0 sec     ________________________________    RADIOLOGY & ADDITIONAL STUDIES:   ________________________________    ASSESSMENT:  New onset paroxysmal atrial fibrillation with RVR  Coronary artery disease status post PCI  Type 2 diabetes  History of renal artery aneurysm  History of COVID-19      PLAN:  In summary, this is a 81y Male with a past medical history of paroxysmal atrial fibrillation, which is a new diagnosis, CAD, hypertension, type 2 diabetes and hyperlipidemia.  The patient converted back to sinus rhythm.  Start Eliquis.  Start amiodarone to maintain sinus rhythm.  No need for repeat echo.  Prior echo from our office as above.  Out pt FU in our office later this wk for EKG.      ____________________________________________  (Dragon Dictation software used). Thank you for allowing me to participate in the care of your patient. Please contact me should any questions arise.    JUAN Murray DO, [facility]  Office: [phone]

## 2022-09-13 NOTE — DISCHARGE NOTE PROVIDER - NSDCMRMEDTOKEN_GEN_ALL_CORE_FT
amiodarone 200 mg oral tablet: 1 tab(s) orally once a day  apixaban 5 mg oral tablet: 1 tab(s) orally 2 times a day  Aspirin Enteric Coated 81 mg oral delayed release tablet: 1 tab(s) orally once a day  atorvastatin 40 mg oral tablet: 1 tab(s) orally once a day  doxazosin 4 mg oral tablet: 1 tab(s) orally once a day  finasteride 5 mg oral tablet: 1 tab(s) orally once a day  glimepiride 1 mg oral tablet: 1 tab(s) orally once a day  hydroCHLOROthiazide 12.5 mg oral tablet: 1 tab(s) orally every other day  Janumet 50 mg-1000 mg oral tablet: 1 tab(s) orally 2 times a day  metoprolol succinate 200 mg oral tablet, extended release: 1 tab(s) orally once a day (in the evening)  Multiple Vitamins oral tablet: 1 tab(s) orally once a day  ramipril 10 mg oral capsule: 1 cap(s) orally once a day

## 2022-09-13 NOTE — DISCHARGE NOTE PROVIDER - HOSPITAL COURSE
CC: Afib  HPI and Hospital course: 81 year old male patient with a pertinent past medical history noted for CAD, HTN, HLD and DM2 presented to the ED complaining of a sudden onset of shortness of breath. Of note, patient endorses waking up and experiencing dyspnea within 20 minutes. Dyspnea associated with a left sided chest tightness that radiated to the left arm. Patient without recent fever, cough, abdominal pain, dizziness or pre-syncope. He has been compliant with his maintenance medication regimen. Patient also with nausea.    Found to have new onset afib w RVR, spontaneously converted to NSR at 330am. Started on amiodarone to maintain NSR, started on eliquis for CVA ppx. For f/u with Dr. Murray later this week.    VITALS:  T(F): 97.7 (09-13-22 @ 08:39), Max: 98.5 (09-12-22 @ 18:15)  HR: 79 (09-13-22 @ 11:51) (64 - 134)  BP: 120/65 (09-13-22 @ 11:51) (105/74 - 136/89)  RR: 18 (09-13-22 @ 08:39) (17 - 18)  SpO2: 98% (09-13-22 @ 08:39) (97% - 99%)    PHYSICAL EXAM:  General: NAD, lying in bed  HEENT:  pupils equal and reactive, EOMI, no oropharyngeal lesions, erythema, exudates, oral thrush  NECK:   supple, no carotid bruits, no palpable lymph nodes, no thyromegaly  CV:  +S1, +S2, regular, no murmurs or rubs  RESP:   lungs clear to auscultation bilaterally, no wheezing, rales, rhonchi, good air entry bilaterally  BREAST:  not examined  GI:  abdomen soft, non-tender, non-distended, normal BS, no bruits, no abdominal masses, no palpable masses  RECTAL:  not examined  :  not examined  MSK:   normal muscle tone, no atrophy, no rigidity, no contractions  EXT:  no clubbing, no cyanosis, no edema, no calf pain, swelling or erythema  VASCULAR:  pulses equal and symmetric in the upper and lower extremities  NEURO:  AAOX3, no focal neurological deficits, follows all commands, able to move extremities spontaneously  SKIN:  no ulcers, lesions or rashes    #new onset Afib  -trop neg  -  -TSH wnl  -had recent echo as outpt  -continue BB, add amio to maintain NSR  -Eliquis for CVA ppx  -close f/u with Dr. Murray later this week    #SOB- in setting of above, resolved    #HTN, DM  -statin, home meds for BP    #CAD  -ASA, statin, BB    I have spent 45 min on day of d/c coordinating care

## 2022-09-13 NOTE — PHARMACOTHERAPY INTERVENTION NOTE - COMMENTS
As per [facility] pharmacy co-pay for one month supply of Eliquis $141.04
Medication history complete, reviewed medications with patient and confirmed with doctor first med hx.

## 2022-09-13 NOTE — PHYSICAL THERAPY INITIAL EVALUATION ADULT - GENERAL OBSERVATIONS, REHAB EVAL
Pt was found standing in the BR on tele, holding onto SAC, Pt is pleasant and willing to participate in PT

## 2022-09-13 NOTE — DISCHARGE NOTE NURSING/CASE MANAGEMENT/SOCIAL WORK - PATIENT PORTAL LINK FT
You can access the FollowMyHealth Patient Portal offered by [facility] by registering at the following website: [email or web address]. By joining [facility]’s FollowMyHealth portal, you will also be able to view your health information using other applications (apps) compatible with our system.

## 2022-09-13 NOTE — PHYSICAL THERAPY INITIAL EVALUATION ADULT - PERTINENT HX OF CURRENT PROBLEM, REHAB EVAL
Pt was admitted for shortness of breath. On arrival he was noted to be in A. fib with RVR which was a new diagnosis with a rate of 146 bpm.  The patient spontaneous converted back to sinus rhythm overnight. He has some chest tightness which resolved after he converted back to sinus rhythm.

## 2022-09-13 NOTE — PHYSICAL THERAPY INITIAL EVALUATION ADULT - WEIGHT-BEARING RESTRICTIONS: GAIT, REHAB EVAL
"Chief Complaint   Patient presents with     Physical       Initial /60 (BP Location: Right arm, Patient Position: Chair, Cuff Size: Adult Regular)  Pulse 68  Temp 98.6  F (37  C) (Oral)  Ht 5' 6\" (1.676 m)  Wt 150 lb 3.2 oz (68.1 kg)  LMP 04/25/2017 (Approximate)  SpO2 98%  Breastfeeding? No  BMI 24.24 kg/m2 Estimated body mass index is 24.24 kg/(m^2) as calculated from the following:    Height as of this encounter: 5' 6\" (1.676 m).    Weight as of this encounter: 150 lb 3.2 oz (68.1 kg).  Medication Reconciliation: complete   NASRA Escobedo      "
full weight-bearing

## 2022-09-13 NOTE — DISCHARGE NOTE PROVIDER - NSDCCPCAREPLAN_GEN_ALL_CORE_FT
PRINCIPAL DISCHARGE DIAGNOSIS  Diagnosis: Atrial fibrillation  Assessment and Plan of Treatment: Take your medications as previously prescribed with the addition of amiodarone and Eliquis. Follow up with Dr. Murray in the office.

## 2022-09-13 NOTE — DISCHARGE NOTE NURSING/CASE MANAGEMENT/SOCIAL WORK - NSDCPEFALRISK_GEN_ALL_CORE
For information on Fall & Injury Prevention, visit: [email or web address] OR  [email or web address] OR  https://www.cdc.gov/steadi/patient.html

## 2022-09-13 NOTE — DISCHARGE NOTE PROVIDER - CARE PROVIDER_API CALL
Jack Murray (DO; TERESA)  Cardiology  [address] [place] [address]  Phone: [phone]  Fax: [phone]  Follow Up Time: 1-3 days

## 2022-09-19 DIAGNOSIS — J44.9 CHRONIC OBSTRUCTIVE PULMONARY DISEASE, UNSPECIFIED: ICD-10-CM

## 2022-09-19 DIAGNOSIS — I25.10 ATHEROSCLEROTIC HEART DISEASE OF NATIVE CORONARY ARTERY WITHOUT ANGINA PECTORIS: ICD-10-CM

## 2022-09-19 DIAGNOSIS — J96.00 ACUTE RESPIRATORY FAILURE, UNSPECIFIED WHETHER WITH HYPOXIA OR HYPERCAPNIA: ICD-10-CM

## 2022-09-19 DIAGNOSIS — N28.1 CYST OF KIDNEY, ACQUIRED: ICD-10-CM

## 2022-09-19 DIAGNOSIS — E11.9 TYPE 2 DIABETES MELLITUS WITHOUT COMPLICATIONS: ICD-10-CM

## 2022-09-19 DIAGNOSIS — I48.0 PAROXYSMAL ATRIAL FIBRILLATION: ICD-10-CM

## 2022-09-19 DIAGNOSIS — I10 ESSENTIAL (PRIMARY) HYPERTENSION: ICD-10-CM

## 2022-09-19 DIAGNOSIS — Z98.1 ARTHRODESIS STATUS: ICD-10-CM

## 2022-09-19 DIAGNOSIS — I08.1 RHEUMATIC DISORDERS OF BOTH MITRAL AND TRICUSPID VALVES: ICD-10-CM

## 2022-09-19 DIAGNOSIS — N40.0 BENIGN PROSTATIC HYPERPLASIA WITHOUT LOWER URINARY TRACT SYMPTOMS: ICD-10-CM

## 2022-09-19 DIAGNOSIS — E78.5 HYPERLIPIDEMIA, UNSPECIFIED: ICD-10-CM

## 2022-09-19 DIAGNOSIS — I37.1 NONRHEUMATIC PULMONARY VALVE INSUFFICIENCY: ICD-10-CM

## 2022-09-19 DIAGNOSIS — Z87.442 PERSONAL HISTORY OF URINARY CALCULI: ICD-10-CM

## 2022-09-19 DIAGNOSIS — I35.0 NONRHEUMATIC AORTIC (VALVE) STENOSIS: ICD-10-CM

## 2022-09-19 DIAGNOSIS — Z79.84 LONG TERM (CURRENT) USE OF ORAL HYPOGLYCEMIC DRUGS: ICD-10-CM

## 2022-09-19 DIAGNOSIS — Z79.01 LONG TERM (CURRENT) USE OF ANTICOAGULANTS: ICD-10-CM

## 2022-09-19 DIAGNOSIS — Z95.5 PRESENCE OF CORONARY ANGIOPLASTY IMPLANT AND GRAFT: ICD-10-CM

## 2022-09-19 DIAGNOSIS — Z79.82 LONG TERM (CURRENT) USE OF ASPIRIN: ICD-10-CM

## 2023-01-13 NOTE — ED STATDOCS - CHPI ED LOCATION
[de-identified] : Is a 64-year-old female who is here today for second opinion regarding her left painful arthritic knee.  She says that her left knee pain is 24 hours a day now.  She says it is hard to walk up and down stairs.  She can only walk small distances.  She says that she has had pain for over a year now and the pain level is an 8 out of 10.  Her symptoms include pain with activity, pain walking, pain at rest, swelling, loss of motion, limping, decreased walk distance, weakness, and giving way.\par \par The location of her pain is on the medial side of her left knee towards the front as well as in the back.  She also has difficulty with her left knee because she has had several right hip surgeries that make it difficult to walk.\par \par She describes the character of the pain is an aching pain at times stabbing and other times it is getting worse.\par \par Painful activities include walking walking after sitting, turning and twisting, squatting, rising from a seat, getting in and out of chairs, going up and down stairs, and getting in and out of cars.\par \par Her nonsurgical treatments have included rest, ice, heat, acupuncture, cane, NSAIDs, physical therapy, home exercise program, knee brace, cortisone injections, hyaluronic acid injections, and stem cell injections.\par \par She has also had a left knee arthroscopy and medial meniscectomy in the past.\par \par Of note she has had 2 operations on her right hip for a labral tear.  She has also had an abductor muscle repair with a mesh by Dr. Mccartney for which she still has significant pain in her hip and weakness she says. abdomen

## 2023-01-13 NOTE — ED PROVIDER NOTE - NORMAL, MLM
----- Message from Halina Montgomery sent at 1/13/2023  1:33 PM CST -----  Pt needs a new handi capped license and needs injection in her knee       Confirmed patient's contact info below:  Contact Name: Bhavna Hancock  Phone Number: [phone]      
Spoke with pt.  Scheduled pt for appointment with Dr Iglesias.  Pt will  handicapped parking form at that time    
mariah all pertinent systems normal

## 2023-03-20 NOTE — ED ADULT NURSE NOTE - BREATH SOUNDS, MLM
"[facility] - Med Surg  Adult Nutrition  Progress Note    SUMMARY       Recommendations    Recommendation/Intervention:   1. Recommend pt diet be modified to Diabetic, Cardiac (IDDSI Level 7) diet when medically appropritate   2. Recommend Suplena BID to assist with nutritional gaps   3. Recommend Abdulkadir BID for wound healing   4. Recommend daily weights    Goals:   1. Pt will consume >60% EEN by RD follow up   2. Pt will consume Abdulkadir BID prior to RD follow up  Nutrition Goal Status: new  Communication of RD Recs: other (comment)    Assessment and Plan    Nutrition Problem  Limited food acceptance    Related to (etiology):   Self limitation of foods d/t preference    Signs and Symptoms (as evidenced by):   Decreased oral intake: 0% PO intake of meals d/t refusal x 5 meals    Interventions(treatment strategy):  1. Recommend pt diet be modified to Diabetic, Cardiac (IDDSI Level 7) diet when medically appropritate   2. Recommend Suplena BID to assist with nutritional gaps   3. Recommend Abdlukadir BID for wound healing   4. Recommend daily weights  5. Collaboration of care with medical providers     Nutrition Diagnosis Status:   New      Malnutrition Assessment                                       Reason for Assessment    Reason For Assessment: RD follow-up  Diagnosis: other (see comments) (Acute renal failure)  Relevant Medical History: DMT2, HTN, Morbid obesity w/ BMI over 70, Diarrhea, Hypernatremia, Hypokalemia, Anemia, Hyperuricemia, Acute cystitis  General Information Comments:   3/20: 60 y.o. Female admitted for Acute renal failure. Visited pt at bedside, pt working with SLP. Spoke to RN, stated pt refusing meals d/t preference. Spoke to RN tech, confirmed pt has refused last 5 meals, reported that speech stated plans to recommend advance to Regular diet. Per Hospital Medicine Physician note 3/20 "The diarrhea has improved; Diet progressed". Nephrology Physician note 3/20 "feels fine  - UTI; MIGUEL due to ATN -no " "indications for dialysis; Metabolic Acidosis  - improved, off all bicarb; Hypernatremia  - mostly resolved; - mag replaced yesterday and today; UTI  - on ABX". Reviewed note:% PO intake of meals: 25%;  LBM 3/19; Skin: Per wound note 3/16 "Right plantar heel DTPI healing well with minimal maroon discoloration and one area now open to full thickness at the center; Right lateral 5th toe DTPI...intact; Left plantar and posterior heel DTPI...maroon discoloration to edges, open to full thickness at the center; Intertrigo noted to lower abdominal/pannus fold, bilateral groin folds, and IAD to bilateral medial thighs and perineum all healing well since last admission; DTPIs noted to bilateral lower/lateral buttocks (R>L).   DTPI noted to right posterior thigh.   Stage 3 pressure injury noted to Left posterior thigh with moist red and yellow subcutaneous tissue to wound bed, irregular edges. Unstageable pressure injuries noted to Bilateral Ischium now stage 3 pressure injuries with moist red wound bed and healing well"; Hayden score; 9 (very high risk); Edema: 2+ mild bilateral foot/leg/arm, general. Labs, meds, weight reviewed. Labs 3/19: Calcium (L), Mg (L), BUN (H), Cr (H), GFR 7. Note atorvastatin 10 mg x daily. Weight charted 2/20 520 lbs, 3/18 494 lbs,-26 lb wt loss (5% wt change) ~1 month. RD will continue to monitor.  Nutrition Discharge Planning: Diabetic, Cardiac diet        Nutrition Risk Screen    Nutrition Risk Screen: large or nonhealing wound, burn or pressure injury, unintentional loss of 10 lbs or more in the past 2 months    Nutrition/Diet History    Spiritual, Cultural Beliefs, Synagogue Practices, Values that Affect Care: no  Food Allergies: NKFA  Factors Affecting Nutritional Intake: impaired cognitive status/motor control, decreased appetite    Anthropometrics    Temp: 98.6 °F (37 °C)  Height: 5' 4" (162.6 cm)  Height (inches): 64 in  Weight Method: Bed Scale  Weight: (!) 224.1 kg (494 lb 0.8 " oz)  Weight (lb): (!) 494.06 lb  Ideal Body Weight (IBW), Female: 120 lb  % Ideal Body Weight, Female (lb): 411.72 %  BMI (Calculated): 84.8  BMI Grade: greater than 40 - morbid obesity     Wt Readings from Last 15 Encounters:   03/20/23 (!) 224.1 kg (494 lb 0.8 oz)   02/20/23 (!) 235.9 kg (520 lb)   01/31/23 (!) 235.9 kg (520 lb 1 oz)   05/10/22 (!) 223.9 kg (493 lb 9.8 oz)   03/07/22 (!) 218.8 kg (482 lb 4.1 oz)   10/13/20 (!) 232.2 kg (511 lb 14.5 oz)   05/21/19 (!) 232.9 kg (513 lb 7.2 oz)   10/04/17 (!) 224.1 kg (494 lb 0.8 oz)   08/02/17 (!) 224.1 kg (494 lb 0.8 oz)   06/21/17 (!) 231.3 kg (510 lb)   06/09/17 (!) 231.6 kg (510 lb 9.4 oz)   05/19/17 (!) 225.5 kg (497 lb 2.2 oz)   05/24/16 (!) 214.4 kg (472 lb 10.7 oz)   01/11/16 (!) 195.8 kg (431 lb 10.6 oz)   01/05/16 (!) 198.4 kg (437 lb 6.3 oz)     Lab/Procedures/Meds    Pertinent Labs Reviewed: reviewed  Pertinent Labs Comments: Calcium (L), Mg (L), BUN (H), Cr (H), GFR 7  Pertinent Medications Reviewed: reviewed  BMP  Lab Results   Component Value Date     03/20/2023    K 3.7 03/20/2023     03/20/2023    CO2 22 (L) 03/20/2023    BUN 72 (H) 03/20/2023    CREATININE 6.2 (H) 03/20/2023    CALCIUM 7.8 (L) 03/20/2023    ANIONGAP 14 03/20/2023    EGFRNORACEVR 7 (A) 03/20/2023      Recent Labs   Lab 03/20/23  1201   POCTGLUCOSE 83    Scheduled Meds:   [START ON 3/21/2023] atorvastatin  10 mg Oral Daily    cefTRIAXone (ROCEPHIN) IVPB  2 g Intravenous Q24H    cholestyramine  1 packet Oral BID    febuxostat  40 mg Oral Daily    folic acid  1 mg Oral Daily    miconazole NITRATE 2 %   Topical (Top) BID    mupirocin   Nasal BID    pantoprazole  40 mg Oral BID    potassium bicarbonate  20 mEq Oral BID    [START ON 3/21/2023] sertraline  100 mg Oral Daily    vancomycin  125 mg Oral Q6H     Continuous Infusions:  PRN Meds:.sodium chloride, sodium chloride 0.9%, acetaminophen, [COMPLETED] calcium gluconate IVPB **AND** calcium gluconate IVPB, dextrose 10%,  dextrose 10%, glucagon (human recombinant), hydrALAZINE, insulin aspart U-100, ondansetron    Physical Findings/Assessment         Estimated/Assessed Needs    Weight Used For Calorie Calculations: 97 kg (213 lb 13.5 oz) (Adj BW)  Energy Calorie Requirements (kcal): 9839-1625 (20-30 kcal/kg Adj BW (MIGUEL, Morbid obese, 411% IBW)  Energy Need Method: Kcal/kg  Protein Requirements: 77-97 (0.8-1.0 g/kg Adj BW (MIGUEL, DM w/ wounds, Morbid obese BMI 84.80)  Weight Used For Protein Calculations: 97 kg (213 lb 13.5 oz) (Adj BW)  Fluid Requirements (mL): 500 mL + total output or Per MD/NP (MIGUEL)  Estimated Fluid Requirement Method: other (see comments)  RDA Method (mL): 1940  CHO Requirement: 242-364 (2613-1626 kcal/8)      Nutrition Prescription Ordered    Current Diet Order: Regular (IDDSI Level 7) diet    Evaluation of Received Nutrient/Fluid Intake  I/O: (Net since admit)  3/20: +5236.3 mL    Energy Calories Required: not meeting needs  Protein Required: not meeting needs  Fluid Required: exceeds needs  Tolerance: tolerating  % Intake of Estimated Energy Needs: 25%  % Meal Intake: 25%    Nutrition Risk    Level of Risk/Frequency of Follow-up: high (F/u x 2 weekly)     Monitor and Evaluation    Food and Nutrient Intake: energy intake, food and beverage intake  Food and Nutrient Adminstration: diet order  Knowledge/Beliefs/Attitudes: food and nutrition knowledge/skill, beliefs and attitudes  Anthropometric Measurements: weight, weight change, body mass index  Biochemical Data, Medical Tests and Procedures: electrolyte and renal panel, gastrointestinal profile, glucose/endocrine profile, inflammatory profile, lipid profile     Nutrition Follow-Up    RD Follow-up?: Yes  Annie Aldrich, Registration Eligible, Provisional LDN     Clear

## 2023-08-30 NOTE — ED STATDOCS - CPE ED RESP NORM
PCP: Sae Starkey MD    REFERRING PHYSICIAN: No ref. provider found    CHIEF COMPLAINT: Pain in the neck that radiates to the left arm    Original HISTORY OF PRESENT ILLNESS: Abel Morris presents to the clinic for the evaluation of the above pain. The pain started 2 months ago.     Original Pain Description:  The pain is located in the left neck and radiates to the left 4th and 5th fingers. The pain is described as stabbing, tingling. Exacerbating factors: twisting, looking down. Mitigating factors sleeping. Symptoms interfere with daily activity. The patient feels like symptoms have been unchanged. Patient denies night fever/night sweats, urinary incontinence, bowel incontinence, and significant weight loss. He does note some weakness in the left arm. Patient reports that his glucose only increased 15-20 points after the TITI.     Original PAIN SCORES:  Best: Pain is 2  Worst: Pain is 8  Current: Pain is 4    INTERVAL HISTORY 7/05/23:   Patient presents for follow up. Patient reports that he followed up with Neurology, but that the Neurologist did not suspect he had ALS, but is not convinced it is completely due to his DM. He is s/p C7/T1 TITI on 05/16/23. Patient reports he had 90% pain relief, but it only lasted 2 weeks. He states that his pain level is back to 50% of previous. The pain is located in the left neck and radiates to the left 4th and 5th fingers. He reports that both of his hands remain weak as well as his legs.     INTERVAL HISTORY 8/30/2023:  Mr. Morris came to me as part of the [facility] program with cervical radiculopathy. We performed a JUAN PABLO and started him on Lyrica. Patient reports good improvement with current regimen. Patient is out of Lyrica 50mg BID. He has been adherent physical therapy and report good improvement with his current regimen. Pain today is 1/10.     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    History of colonic polyps 8/17/2018    Hyperlipidemia 11/28/2012    Poor compliance 1/8/2014    Primary hypertension 10/9/2021     Past Surgical History:   Procedure Laterality Date    COLONOSCOPY N/A 8/2/2016    Procedure: COLONOSCOPY;  Surgeon: Miguel Persaud MD;  Location: [facility] ENDO;  Service: Endoscopy;  Laterality: N/A;    COLONOSCOPY N/A 9/3/2019    Procedure: COLONOSCOPY;  Surgeon: Álvaro Carmichael MD;  Location: [facility] ENDO;  Service: Endoscopy;  Laterality: N/A;  confirmed appt- sp    EPIDURAL STEROID INJECTION N/A 5/16/2023    Procedure: INJECTION, STEROID, EPIDURAL, C7-T1 (LEFT SIDE) ECEN;  Surgeon: Allie Torres MD;  Location: [facility] PAIN MGT;  Service: Pain Management;  Laterality: N/A;    LUMBAR PUNCTURE N/A 09/19/2019     Social History     Socioeconomic History    Marital status:    Tobacco Use    Smoking status: Former     Types: Cigars    Smokeless tobacco: Never   Substance and Sexual Activity    Alcohol use: Yes     Alcohol/week: 6.0 standard drinks of alcohol     Types: 6 Cans of beer per week     Comment: per week    Drug use: No    Sexual activity: Not Currently     Social Determinants of Health     Financial Resource Strain: Low Risk  (5/29/2023)    Overall Financial Resource Strain (CARDIA)     Difficulty of Paying Living Expenses: Not hard at all   Recent Concern: Financial Resource Strain - Medium Risk (4/11/2023)    Overall Financial Resource Strain (CARDIA)     Difficulty of Paying  Living Expenses: Somewhat hard   Food Insecurity: No Food Insecurity (5/29/2023)    Hunger Vital Sign     Worried About Running Out of Food in the Last Year: Never true     Ran Out of Food in the Last Year: Never true   Transportation Needs: No Transportation Needs (5/29/2023)    PRAPARE - Transportation     Lack of Transportation (Medical): No     Lack of Transportation (Non-Medical): No   Physical Activity: Inactive (5/29/2023)    Exercise Vital Sign     Days of Exercise per Week: 0 days     Minutes of Exercise per Session: 0 min   Stress: No Stress Concern Present (5/29/2023)    [employer] - Occupational Stress Questionnaire     Feeling of Stress : Not at all   Recent Concern: Stress - Stress Concern Present (3/7/2023)    [employer] - Occupational Stress Questionnaire     Feeling of Stress : To some extent   Social Connections: Unknown (5/29/2023)    Social Connection and Isolation Panel [NHANES]     Frequency of Communication with Friends and Family: More than three times a week     Frequency of Social Gatherings with Friends and Family: More than three times a week     Active Member of Clubs or Organizations: Yes     Attends Club or Organization Meetings: More than 4 times per year     Marital Status:    Housing Stability: High Risk (5/29/2023)    Housing Stability Vital Sign     Unable to Pay for Housing in the Last Year: Yes     Number of Places Lived in the Last Year: 1     Unstable Housing in the Last Year: No     Family History   Problem Relation Age of Onset    No Known Problems Mother     Colon polyps Father 61    No Known Problems Sister     No Known Problems Brother     No Known Problems Maternal Aunt     No Known Problems Maternal Uncle     No Known Problems Paternal Aunt     No Known Problems Paternal Uncle     Diabetes Maternal Grandmother     No Known Problems Maternal Grandfather     No Known Problems Paternal Grandmother     No  "Known Problems Paternal Grandfather     Amblyopia Neg Hx     Blindness Neg Hx     Cancer Neg Hx     Cataracts Neg Hx     Glaucoma Neg Hx     Hypertension Neg Hx     Macular degeneration Neg Hx     Retinal detachment Neg Hx     Strabismus Neg Hx     Stroke Neg Hx     Thyroid disease Neg Hx        Review of patient's allergies indicates:  No Known Allergies    Current Outpatient Medications   Medication Sig    aspirin (ECOTRIN) 81 MG EC tablet Take 1 tablet (81 mg total) by mouth once daily.    blood sugar diagnostic Strp To check BG 2 times daily, to use with insurance preferred meter    blood-glucose meter kit To check BG 3 times daily, to use with insurance preferred meter    celecoxib (CELEBREX) 200 MG capsule Take 1 capsule (200 mg total) by mouth 2 (two) times daily.    dulaglutide (TRULICITY) 1.5 mg/0.5 mL pen injector Inject 1.5 mg into the skin every 7 days.    glimepiride (AMARYL) 2 MG tablet Take 1 tablet (2 mg total) by mouth before breakfast.    lancets Misc To check BG 2 times daily, to use with insurance preferred meter    metFORMIN (GLUCOPHAGE) 1000 MG tablet Take 1 tablet by mouth twice daily    multivitamin (THERAGRAN) per tablet Take 1 tablet by mouth once daily.    pen needle, diabetic (BD ULTRA-FINE SHORT PEN NEEDLE) 31 gauge x 5/16" Ndle USE  ONCE DAILY    pravastatin (PRAVACHOL) 40 MG tablet Take 1 tablet (40 mg total) by mouth once daily.    pregabalin (LYRICA) 50 MG capsule Take 1 capsule (50 mg total) by mouth 2 (two) times daily.    sildenafil (REVATIO) 20 mg Tab 1-5 pills at a time per day prn    TRUEPLUS LANCETS 33 gauge Misc USE 1 TO CHECK GLUCOSE TWICE DAILY    insulin glargine, TOUJEO, (TOUJEO SOLOSTAR U-300 INSULIN) 300 unit/mL (1.5 mL) InPn pen Inject 24 units once daily (Patient not taking: Reported on 8/30/2023)     No current facility-administered medications for this visit.     Facility-Administered Medications Ordered in Other Visits   Medication    0.9%  NaCl infusion " "      ROS:  GENERAL: No fever. No chills. No fatigue. Denies weight loss. Denies weight gain.  HEENT: Denies headaches. Denies vision change. Denies eye pain. Denies double vision. Denies ear pain.   CV: Denies chest pain.   PULM: Denies of shortness of breath.  GI: Denies constipation. No diarrhea. No abdominal pain. Denies nausea. Denies vomiting. No blood in stool.  HEME: Denies bleeding problems.  : Denies urgency. No painful urination. No blood in urine.  MS: Denies joint stiffness. Denies joint swelling.  + Neck pain.  SKIN: Denies rash.   NEURO: Denies seizures. No weakness.  PSYCH:  Denies difficulty sleeping. No anxiety. Denies depression. No suicidal thoughts.       VITALS:   Vitals:    23 0857   BP: 104/68   Pulse: 96   Resp: 18   Temp: 97.6 °F (36.4 °C)   SpO2: 100%   Weight: 108 kg (238 lb 1.6 oz)   Height: 6' 4" (1.93 m)   PainSc:   1   PainLoc: Neck           PHYSICAL EXAM:   GENERAL: Well appearing, in no acute distress, alert and oriented x3.  PSYCH:  Mood and affect appropriate.  SKIN: Skin color, texture, turgor normal, no rashes or lesions.  HEENT:  Normocephalic, atraumatic. Cranial nerves grossly intact.  NECK: No pain with extension, flexion, or rotation. Spurling's negative.  Facet loading negative.   PULM: No evidence of respiratory difficulty, symmetric chest rise.  GI:  Non-distended  BACK: Normal range of motion.   EXTREMITIES: No deformities, edema, or skin discoloration.   MUSCULOSKELETAL: Shoulder, hip, and knee provocative maneuvers are negative.  NEURO: Sensation is equal and symmetric to light touch in distal BUE and BLE. Strength in 5/5 in BUE and BLE. No pain with resisted movements.  Negative Hanson's / Babinski / clonus bilaterally.     GAIT: normal.      LABS:      [id number] MRI Brain and C-Spine  FINDINGS:  MRI brain: Several punctate foci of T2 FLAIR signal hyperintensity supratentorial white matter predominant clustered in the frontal lobes which are " nonspecific and sequela migraine headaches to be included in differential.  There is more confluent focus of T2 FLAIR signal hyperintensity in the right frontal periventricular white matter and extending to the margin of the anterior body corpus callosum which may be sequela of remote vascular event or prior demyelination..  There is no restricted diffusion to suggest acute or recent infarction.  Major intracranial T2 flow voids are present.  There is no abnormal parenchymal enhancement.     MRI cervical spine: Cervical sagittal alignment is similar to prior.  Cervical vertebral body heights contour and marrow signal is relatively stable without evidence for acute fracture or subluxation.  Continued scattered degenerative disc disease with mild disc desiccation height loss.  Craniocervical junction within normal limits.  Cerebellar tonsils appropriately located     Cervical spinal cord stable in signal and contour no cord signal abnormality to suggest edema.  No abnormal intrathecal enhancement.     C2/C3: Posterior disc osteophyte with uncovertebral joint hypertrophy and facet joint arthropathy without significant central canal stenosis with mild left greater right neural foraminal stenosis.     C3/C4: Posterior disc osteophyte with uncovertebral joint hypertrophy and facet arthropathy without significant central canal stenosis and mild right greater than left neural foraminal stenosis.     C4/C5: Posterior disc osteophyte with uncovertebral joint hypertrophy and facet arthropathy mild central canal stenosis without significant neural foraminal stenosis.     C5/C6: Posterior disc osteophyte with uncovertebral joint hypertrophy and facet arthropathy mild central canal stenosis without significant neural foraminal stenosis.     C6/C7: Posterior disc osteophyte with uncovertebral joint hypertrophy and facet arthropathy previous left foraminal protrusion less apparent.  There is continued mild central canal stenosis  with mild moderate left greater than right neural foraminal stenosis.     C7/T1: No significant disc bulge central canal or neural foraminal stenosis.     Impression:     MRI brain: T2 flair signal hyperintensity focus right frontal periventricular white matter extending to the margin of the anterior body/genu corpus callosum which is nonspecific and may be sequela of prior demyelination or prior vascular event.  Composed several scattered punctate foci of T2 FLAIR signal hyperintensity elsewhere supratentorial white matter which are also nonspecific sequela migraine headaches to be included in differential.  No evidence for acute infarction or enhancing lesion.     Clinical correlation and follow-up advised     MRI cervical spine: Multilevel degenerative change cervical spine as detailed above previous left C6/C7 foraminal protrusion less apparent.  There is continued posterior disc osteophyte with uncovertebral joint hypertrophy and facet joint arthropathy at this level with mild central canal and mild moderate left greater than right neural foraminal stenosis.     No cord signal abnormality to suggest edema.  No abnormal intrathecal enhancement.     Please see above for additional details.    EMG 5/29/23  Impression   This is an abnormal study. There is electrophysiologic evidence of:   1. 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   2. At least moderate severity right carpal tunnel syndrome (median neuropathy at the wrist).   3. Widespread active denervation (fibrillation potentials) in multiple muscles in the distal right leg, which can be accounted for by this patient's known history of severe diabetes. Alternatively, the active muscle denervation could be part of a more  systemic pathology such as motor neuron disease. Surprisingly, there were no other muscles evaluated that revealed active denervation, including the muscles of the right upper extremity where there is significant muscle atrophy in the forearm and hand (split hand phenomenon is present). Even though there remains a suspicion for motor neuron disease, electrophysiologic criteria are not met at this time. Similarly, the neurological exam is abnormal but not definitively convincing for motor neuron disease. The physical changes that are diabetes-related confound the exam. Close clinical surveillance and evaluation for mimicking diseases are recommended.     ASSESSMENT: 57 y.o. year old male with pain, consistent with:    Encounter Diagnoses   Name Primary?    Cervical spondylosis     DDD (degenerative disc disease), cervical     Cervical radiculopathy Yes       DISCUSSION: Mr. Morris comes [facility] me as [facility] of [facility]. He was previously worked up by Dr. Mary for cervical radiculopathy. MRI shows a C6/7 foraminal protrusion with possible impingement on the left C7 nerve root. He reports not being able to carry heavy things with that arm, but there is no obvious unilateral weakness on exam. He does have interosseous weakness bilaterally. Exam does indicate pain with ROM of the neck and reproduction of symptoms when he leans his head left but does not explain his interossei weakness. EMG was not diagnostic for motor neuron disease. He is 50% improved after JUAN PABLO.     PLAN:  Continue physical therapy with emphasis on establishing home exercise program.   Discontinue Lyrica since patient has not been using it. Can resume Lyrica 50mg QHS in the future if patient has return of radicular symptoms.   Discontinue Celecoxib because patient has discontinued use.    RTC as needed.    Willie Cortez  08/30/2023     normal...

## 2023-10-30 NOTE — PATIENT PROFILE ADULT - TRANSPORTATION
78 y.o presenting for headache congestion and cough x 1 day. denies n, v, abd pain. endorses fever noted today. denies neck stiffness, dysuria, diarrhea.
no

## 2024-05-09 ENCOUNTER — OUTPATIENT (OUTPATIENT)
Dept: OUTPATIENT SERVICES | Facility: HOSPITAL | Age: 84
LOS: 1 days | End: 2024-05-09
Payer: MEDICARE

## 2024-05-09 VITALS
DIASTOLIC BLOOD PRESSURE: 81 MMHG | HEART RATE: 64 BPM | WEIGHT: 205.03 LBS | OXYGEN SATURATION: 100 % | SYSTOLIC BLOOD PRESSURE: 152 MMHG | TEMPERATURE: 97 F | HEIGHT: 71 IN | RESPIRATION RATE: 16 BRPM

## 2024-05-09 DIAGNOSIS — Z29.9 ENCOUNTER FOR PROPHYLACTIC MEASURES, UNSPECIFIED: ICD-10-CM

## 2024-05-09 DIAGNOSIS — Z01.818 ENCOUNTER FOR OTHER PREPROCEDURAL EXAMINATION: ICD-10-CM

## 2024-05-09 DIAGNOSIS — Z98.41 CATARACT EXTRACTION STATUS, RIGHT EYE: Chronic | ICD-10-CM

## 2024-05-09 DIAGNOSIS — Z98.1 ARTHRODESIS STATUS: Chronic | ICD-10-CM

## 2024-05-09 DIAGNOSIS — Z98.89 OTHER SPECIFIED POSTPROCEDURAL STATES: Chronic | ICD-10-CM

## 2024-05-09 DIAGNOSIS — Z95.5 PRESENCE OF CORONARY ANGIOPLASTY IMPLANT AND GRAFT: Chronic | ICD-10-CM

## 2024-05-09 DIAGNOSIS — Z98.49 CATARACT EXTRACTION STATUS, UNSPECIFIED EYE: Chronic | ICD-10-CM

## 2024-05-09 LAB
ALBUMIN SERPL ELPH-MCNC: 3.4 G/DL — SIGNIFICANT CHANGE UP (ref 3.3–5)
ANION GAP SERPL CALC-SCNC: 4 MMOL/L — LOW (ref 5–17)
APPEARANCE UR: CLEAR — SIGNIFICANT CHANGE UP
APTT BLD: 30.8 SEC — SIGNIFICANT CHANGE UP (ref 24.5–35.6)
BACTERIA # UR AUTO: NEGATIVE /HPF — SIGNIFICANT CHANGE UP
BASOPHILS # BLD AUTO: 0.06 K/UL — SIGNIFICANT CHANGE UP (ref 0–0.2)
BASOPHILS NFR BLD AUTO: 1.1 % — SIGNIFICANT CHANGE UP (ref 0–2)
BILIRUB UR-MCNC: NEGATIVE — SIGNIFICANT CHANGE UP
BLD GP AB SCN SERPL QL: SIGNIFICANT CHANGE UP
BUN SERPL-MCNC: 21 MG/DL — SIGNIFICANT CHANGE UP (ref 7–23)
CALCIUM SERPL-MCNC: 9.1 MG/DL — SIGNIFICANT CHANGE UP (ref 8.5–10.1)
CAST: 2 /LPF — SIGNIFICANT CHANGE UP (ref 0–4)
CHLORIDE SERPL-SCNC: 107 MMOL/L — SIGNIFICANT CHANGE UP (ref 96–108)
CO2 SERPL-SCNC: 27 MMOL/L — SIGNIFICANT CHANGE UP (ref 22–31)
COLOR SPEC: YELLOW — SIGNIFICANT CHANGE UP
CREAT SERPL-MCNC: 1.28 MG/DL — SIGNIFICANT CHANGE UP (ref 0.5–1.3)
DIFF PNL FLD: ABNORMAL
EGFR: 56 ML/MIN/1.73M2 — LOW
EOSINOPHIL # BLD AUTO: 0.13 K/UL — SIGNIFICANT CHANGE UP (ref 0–0.5)
EOSINOPHIL NFR BLD AUTO: 2.5 % — SIGNIFICANT CHANGE UP (ref 0–6)
GLUCOSE SERPL-MCNC: 121 MG/DL — HIGH (ref 70–99)
GLUCOSE UR QL: 250 MG/DL
HCT VFR BLD CALC: 41.7 % — SIGNIFICANT CHANGE UP (ref 39–50)
HGB BLD-MCNC: 13 G/DL — SIGNIFICANT CHANGE UP (ref 13–17)
IMM GRANULOCYTES NFR BLD AUTO: 0.9 % — SIGNIFICANT CHANGE UP (ref 0–0.9)
INR BLD: 1.46 RATIO — HIGH (ref 0.85–1.18)
KETONES UR-MCNC: NEGATIVE MG/DL — SIGNIFICANT CHANGE UP
LEUKOCYTE ESTERASE UR-ACNC: NEGATIVE — SIGNIFICANT CHANGE UP
LYMPHOCYTES # BLD AUTO: 1.38 K/UL — SIGNIFICANT CHANGE UP (ref 1–3.3)
LYMPHOCYTES # BLD AUTO: 26.2 % — SIGNIFICANT CHANGE UP (ref 13–44)
MCHC RBC-ENTMCNC: 26.9 PG — LOW (ref 27–34)
MCHC RBC-ENTMCNC: 31.2 GM/DL — LOW (ref 32–36)
MCV RBC AUTO: 86.3 FL — SIGNIFICANT CHANGE UP (ref 80–100)
MONOCYTES # BLD AUTO: 0.44 K/UL — SIGNIFICANT CHANGE UP (ref 0–0.9)
MONOCYTES NFR BLD AUTO: 8.3 % — SIGNIFICANT CHANGE UP (ref 2–14)
NEUTROPHILS # BLD AUTO: 3.21 K/UL — SIGNIFICANT CHANGE UP (ref 1.8–7.4)
NEUTROPHILS NFR BLD AUTO: 61 % — SIGNIFICANT CHANGE UP (ref 43–77)
NITRITE UR-MCNC: NEGATIVE — SIGNIFICANT CHANGE UP
PH UR: 5 — SIGNIFICANT CHANGE UP (ref 5–8)
PLATELET # BLD AUTO: 205 K/UL — SIGNIFICANT CHANGE UP (ref 150–400)
POTASSIUM SERPL-MCNC: 4.2 MMOL/L — SIGNIFICANT CHANGE UP (ref 3.5–5.3)
POTASSIUM SERPL-SCNC: 4.2 MMOL/L — SIGNIFICANT CHANGE UP (ref 3.5–5.3)
PROT UR-MCNC: 30 MG/DL
PROTHROM AB SERPL-ACNC: 16.3 SEC — HIGH (ref 9.5–13)
RBC # BLD: 4.83 M/UL — SIGNIFICANT CHANGE UP (ref 4.2–5.8)
RBC # FLD: 17.2 % — HIGH (ref 10.3–14.5)
RBC CASTS # UR COMP ASSIST: 6 /HPF — HIGH (ref 0–4)
SODIUM SERPL-SCNC: 138 MMOL/L — SIGNIFICANT CHANGE UP (ref 135–145)
SP GR SPEC: 1.02 — SIGNIFICANT CHANGE UP (ref 1–1.03)
SQUAMOUS # UR AUTO: 1 /HPF — SIGNIFICANT CHANGE UP (ref 0–5)
UROBILINOGEN FLD QL: 1 MG/DL — SIGNIFICANT CHANGE UP (ref 0.2–1)
WBC # BLD: 5.27 K/UL — SIGNIFICANT CHANGE UP (ref 3.8–10.5)
WBC # FLD AUTO: 5.27 K/UL — SIGNIFICANT CHANGE UP (ref 3.8–10.5)
WBC UR QL: 1 /HPF — SIGNIFICANT CHANGE UP (ref 0–5)

## 2024-05-09 PROCEDURE — 85610 PROTHROMBIN TIME: CPT

## 2024-05-09 PROCEDURE — 83036 HEMOGLOBIN GLYCOSYLATED A1C: CPT

## 2024-05-09 PROCEDURE — 86850 RBC ANTIBODY SCREEN: CPT

## 2024-05-09 PROCEDURE — 86901 BLOOD TYPING SEROLOGIC RH(D): CPT

## 2024-05-09 PROCEDURE — 81001 URINALYSIS AUTO W/SCOPE: CPT

## 2024-05-09 PROCEDURE — 87641 MR-STAPH DNA AMP PROBE: CPT

## 2024-05-09 PROCEDURE — 87640 STAPH A DNA AMP PROBE: CPT

## 2024-05-09 PROCEDURE — 85730 THROMBOPLASTIN TIME PARTIAL: CPT

## 2024-05-09 PROCEDURE — 82040 ASSAY OF SERUM ALBUMIN: CPT

## 2024-05-09 PROCEDURE — 36415 COLL VENOUS BLD VENIPUNCTURE: CPT

## 2024-05-09 PROCEDURE — 93005 ELECTROCARDIOGRAM TRACING: CPT

## 2024-05-09 PROCEDURE — 71046 X-RAY EXAM CHEST 2 VIEWS: CPT | Mod: 26

## 2024-05-09 PROCEDURE — 80048 BASIC METABOLIC PNL TOTAL CA: CPT

## 2024-05-09 PROCEDURE — 93010 ELECTROCARDIOGRAM REPORT: CPT

## 2024-05-09 PROCEDURE — 99214 OFFICE O/P EST MOD 30 MIN: CPT | Mod: 25

## 2024-05-09 PROCEDURE — 71046 X-RAY EXAM CHEST 2 VIEWS: CPT

## 2024-05-09 PROCEDURE — 85025 COMPLETE CBC W/AUTO DIFF WBC: CPT

## 2024-05-09 PROCEDURE — 86900 BLOOD TYPING SEROLOGIC ABO: CPT

## 2024-05-09 RX ORDER — ASPIRIN/CALCIUM CARB/MAGNESIUM 324 MG
1 TABLET ORAL
Qty: 0 | Refills: 0 | DISCHARGE

## 2024-05-09 RX ORDER — ATORVASTATIN CALCIUM 80 MG/1
1 TABLET, FILM COATED ORAL
Qty: 0 | Refills: 0 | DISCHARGE

## 2024-05-09 NOTE — H&P PST ADULT - MUSCULOSKELETAL COMMENTS
Lumbar site well healed scar, no paraspinal tenderness See HPI ; right shoulder pain --treating conservatively

## 2024-05-09 NOTE — H&P PST ADULT - NSICDXPASTSURGICALHX_GEN_ALL_CORE_FT
PAST SURGICAL HISTORY:  H/O bilateral cataract extraction     H/O lithotripsy 2013    S/P lumbar spinal fusion     S/P T&A (status post tonsillectomy and adenoidectomy)     Stented coronary artery 2/28/08 OM2 x 1; 3/13/2008 x 3 stents

## 2024-05-09 NOTE — H&P PST ADULT - HISTORY OF PRESENT ILLNESS
83 y.o WD, WN male ambulating with a cane presents to PST  83 y.o WD, WN male ambulating with a cane presents to PST with hx of back pain, spinal stenosis. He had a spinal fusion in 2015. Patient states he had some improvement, treating residual pain conservatively with Advil. He reports his pain worsening, radiating down both legs, with left ankle to foot consistent pain. He has had steroid injections ~ every 6 months with no significant relief. Hx significant for CAD- stents x5, AS, HTN, Hyperlipidemia, and DM II. Patient has followed with surgeon and scheduled for Exploration of Fusion, Revision of Hardware L2-5, Laminectomy L1/2, L5/S1

## 2024-05-09 NOTE — H&P PST ADULT - ASSESSMENT
83 y.o male scheduled for   Plan  1. Stop all NSAIDS, herbal supplements and vitamins for 7 days.  2. NPO at midnight.  3. Take the following medications ( ) with small sips of water on the morning of your procedure/surgery.  4. Use EZ sponges as directed  5. Use mupirocin as directed  6. Labs, EKG, CXR a sper surgeon  7. PMD/cardiologist visit for optimization prior to surgery as per surgeon.    CAPRINI SCORE    AGE RELATED RISK FACTORS                                                       MOBILITY RELATED FACTORS  [ ] Age 41-60 years                                            (1 Point)                  [ ] Bed rest                                                        (1 Point)  [ ] Age: 61-74 years                                           (2 Points)                [ ] Plaster cast                                                   (2 Points)  [ ] Age= 75 years                                              (3 Points)                 [ ] Bed bound for more than 72 hours                   (2 Points)    DISEASE RELATED RISK FACTORS                                               GENDER SPECIFIC FACTORS  [ ] Edema in the lower extremities                       (1 Point)                  [ ] Pregnancy                                                     (1 Point)  [ ] Varicose veins                                               (1 Point)                  [ ] Post-partum < 6 weeks                                   (1 Point)             [ ] BMI > 25 Kg/m2                                            (1 Point)                  [ ] Hormonal therapy  or oral contraception            (1 Point)                 [ ] Sepsis (in the previous month)                        (1 Point)                  [ ] History of pregnancy complications  [ ] Pneumonia or serious lung disease                                               [ ] Unexplained or recurrent                       (1 Point)           (in the previous month)                               (1 Point)  [ ] Abnormal pulmonary function test                     (1 Point)                 SURGERY RELATED RISK FACTORS  [ ] Acute myocardial infarction                              (1 Point)                 [ ]  Section                                            (1 Point)  [ ] Congestive heart failure (in the previous month)  (1 Point)                 [ ] Minor surgery                                                 (1 Point)   [ ] Inflammatory bowel disease                             (1 Point)                 [ ] Arthroscopic surgery                                        (2 Points)  [ ] Central venous access                                    (2 Points)                [ ] General surgery lasting more than 45 minutes   (2 Points)       [ ] Stroke (in the previous month)                          (5 Points)               [ ] Elective arthroplasty                                        (5 Points)            [  ] Malignancy present or previous                   (2 Points)                                                                                                                              HEMATOLOGY RELATED FACTORS                                                 TRAUMA RELATED RISK FACTORS  [ ] Prior episodes of VTE                                     (3 Points)                 [ ] Fracture of the hip, pelvis, or leg                       (5 Points)  [ ] Positive family history for VTE                         (3 Points)                 [ ] Acute spinal cord injury (in the previous month)  (5 Points)  [ ] Prothrombin 80478 A                                      (3 Points)                 [ ] Paralysis  (less than 1 month)                          (5 Points)  [ ] Factor V Leiden                                             (3 Points)                 [ ] Multiple Trauma within 1 month                         (5 Points)  [ ] Lupus anticoagulants                                     (3 Points)                                                           [ ] Anticardiolipin antibodies                                (3 Points)                                                       [ ] High homocysteine in the blood                      (3 Points)                                             [ ] Other congenital or acquired thrombophilia       (3 Points)                                                [ ] Heparin induced thrombocytopenia                  (3 Points)                                          Total Score [          ] 83 y.o male scheduled for  Exploration of Fusion, Revision of Hardware L2-5, Laminectomy L1/2, L5/S1   Plan  1. Stop all NSAIDS, herbal supplements and vitamins for 7 days. Eliquis, Plavix per cardiology directions   2. NPO at midnight.  3. Take the following medications Ramipril  with small sips of water on the morning of your procedure/surgery.  4. Use EZ sponges as directed  5. Use mupirocin as directed  6. Labs, EKG, CXR, MRSA swab as per surgeon  7. PMD TRISTA Collier and Dr Yates cardiologist visit for optimization prior to surgery as per surgeon.  8. Preprocedure education provided including "preparing for spine surgery" booklet     CAPRINI SCORE    AGE RELATED RISK FACTORS                                                       MOBILITY RELATED FACTORS  [ ] Age 41-60 years                                            (1 Point)                  [ ] Bed rest                                                        (1 Point)  [ ] Age: 61-74 years                                           (2 Points)                [ ] Plaster cast                                                   (2 Points)  [x ] Age= 75 years                                              (3 Points)                 [ ] Bed bound for more than 72 hours                   (2 Points)    DISEASE RELATED RISK FACTORS                                               GENDER SPECIFIC FACTORS  [x ] Edema in the lower extremities                       (1 Point)                  [ ] Pregnancy                                                     (1 Point)  [ ] Varicose veins                                               (1 Point)                  [ ] Post-partum < 6 weeks                                   (1 Point)             [x ] BMI > 25 Kg/m2                                            (1 Point)                  [ ] Hormonal therapy  or oral contraception            (1 Point)                 [ ] Sepsis (in the previous month)                        (1 Point)                  [ ] History of pregnancy complications  [ ] Pneumonia or serious lung disease                                               [ ] Unexplained or recurrent                       (1 Point)           (in the previous month)                               (1 Point)  [ ] Abnormal pulmonary function test                     (1 Point)                 SURGERY RELATED RISK FACTORS  [ ] Acute myocardial infarction                              (1 Point)                 [ ]  Section                                            (1 Point)  [ ] Congestive heart failure (in the previous month)  (1 Point)                 [ ] Minor surgery                                                 (1 Point)   [ ] Inflammatory bowel disease                             (1 Point)                 [ ] Arthroscopic surgery                                        (2 Points)  [ ] Central venous access                                    (2 Points)                [ x] General surgery lasting more than 45 minutes   (2 Points)       [ ] Stroke (in the previous month)                          (5 Points)               [ ] Elective arthroplasty                                        (5 Points)            [  ] Malignancy present or previous                   (2 Points)                                                                                                                              HEMATOLOGY RELATED FACTORS                                                 TRAUMA RELATED RISK FACTORS  [ ] Prior episodes of VTE                                     (3 Points)                 [ ] Fracture of the hip, pelvis, or leg                       (5 Points)  [ ] Positive family history for VTE                         (3 Points)                 [ ] Acute spinal cord injury (in the previous month)  (5 Points)  [ ] Prothrombin 37335 A                                      (3 Points)                 [ ] Paralysis  (less than 1 month)                          (5 Points)  [ ] Factor V Leiden                                             (3 Points)                 [ ] Multiple Trauma within 1 month                         (5 Points)  [ ] Lupus anticoagulants                                     (3 Points)                                                           [ ] Anticardiolipin antibodies                                (3 Points)                                                       [ ] High homocysteine in the blood                      (3 Points)                                             [ ] Other congenital or acquired thrombophilia       (3 Points)                                                [ ] Heparin induced thrombocytopenia                  (3 Points)                                          Total Score [      7    ]    The Caprini score indicates that this patient is at high risk for a VTE event (score > = 6).    Ssurgical patients in this group will benefit from both pharmacologic prophylaxis and intermittent compression devices.    The surgical team will determine the balance between VTE risk and bleeding risk, and other clinical considerations.

## 2024-05-09 NOTE — H&P PST ADULT - NSICDXPASTMEDICALHX_GEN_ALL_CORE_FT
PAST MEDICAL HISTORY:  Aortic stenosis, mild 2008    BPH (benign prostatic hypertrophy)     CAD (coronary artery disease)     Hyperlipidemia     Hypertension     PAF (paroxysmal atrial fibrillation)     Renal calculus     Renal cyst     Sciatica     Spinal stenosis of lumbar region s/p ZHOU x 2    Type 2 diabetes mellitus

## 2024-05-09 NOTE — H&P PST ADULT - CARDIOVASCULAR COMMENTS
preop cardiac evaluation done with Dr Yates --hx CAD ~ 4-5 stents last placed 2023, HTN, Hyperlipidemia, AS, PAF mild bilateral ankle edema

## 2024-05-09 NOTE — H&P PST ADULT - CARDIOVASCULAR
regular rate and rhythm/no gallops/no rub/murmur details… regular rate and rhythm/no gallops/no rub/murmur/vascular

## 2024-05-10 DIAGNOSIS — Z01.818 ENCOUNTER FOR OTHER PREPROCEDURAL EXAMINATION: ICD-10-CM

## 2024-05-10 LAB
A1C WITH ESTIMATED AVERAGE GLUCOSE RESULT: 7.4 % — HIGH (ref 4–5.6)
ESTIMATED AVERAGE GLUCOSE: 166 MG/DL — HIGH (ref 68–114)
MRSA PCR RESULT.: SIGNIFICANT CHANGE UP
S AUREUS DNA NOSE QL NAA+PROBE: DETECTED

## 2024-05-10 NOTE — CHART NOTE - NSCHARTNOTEFT_GEN_A_CORE
Patient contact via phone and notified of MRSA/MSSA nasal swab results performed on 05/09/2024 at [facility].  Patient is MSSA+.  Results explained and all questions answered.  Mupirocin instructions reviewed with patient and patient will start use on 05/16/24 for 5 consecutive days prior to surgery on 05/21/2024.

## 2024-05-21 ENCOUNTER — INPATIENT (INPATIENT)
Facility: HOSPITAL | Age: 84
LOS: 3 days | Discharge: NURSING, REHAB OR LONG-TERM CARE | DRG: 460 | End: 2024-05-25
Attending: ORTHOPAEDIC SURGERY | Admitting: ORTHOPAEDIC SURGERY
Payer: MEDICARE

## 2024-05-21 VITALS
HEIGHT: 71 IN | HEART RATE: 70 BPM | DIASTOLIC BLOOD PRESSURE: 88 MMHG | RESPIRATION RATE: 16 BRPM | SYSTOLIC BLOOD PRESSURE: 138 MMHG | WEIGHT: 205.03 LBS | TEMPERATURE: 98 F | OXYGEN SATURATION: 97 %

## 2024-05-21 DIAGNOSIS — Z98.89 OTHER SPECIFIED POSTPROCEDURAL STATES: Chronic | ICD-10-CM

## 2024-05-21 DIAGNOSIS — Z98.1 ARTHRODESIS STATUS: Chronic | ICD-10-CM

## 2024-05-21 DIAGNOSIS — M96.1 POSTLAMINECTOMY SYNDROME, NOT ELSEWHERE CLASSIFIED: ICD-10-CM

## 2024-05-21 DIAGNOSIS — Z95.5 PRESENCE OF CORONARY ANGIOPLASTY IMPLANT AND GRAFT: Chronic | ICD-10-CM

## 2024-05-21 DIAGNOSIS — Z98.41 CATARACT EXTRACTION STATUS, RIGHT EYE: Chronic | ICD-10-CM

## 2024-05-21 LAB
ANION GAP SERPL CALC-SCNC: 5 MMOL/L — SIGNIFICANT CHANGE UP (ref 5–17)
BASOPHILS # BLD AUTO: 0.03 K/UL — SIGNIFICANT CHANGE UP (ref 0–0.2)
BASOPHILS NFR BLD AUTO: 0.3 % — SIGNIFICANT CHANGE UP (ref 0–2)
BUN SERPL-MCNC: 23 MG/DL — SIGNIFICANT CHANGE UP (ref 7–23)
CALCIUM SERPL-MCNC: 8.7 MG/DL — SIGNIFICANT CHANGE UP (ref 8.5–10.1)
CHLORIDE SERPL-SCNC: 111 MMOL/L — HIGH (ref 96–108)
CO2 SERPL-SCNC: 23 MMOL/L — SIGNIFICANT CHANGE UP (ref 22–31)
CREAT SERPL-MCNC: 1.13 MG/DL — SIGNIFICANT CHANGE UP (ref 0.5–1.3)
EGFR: 65 ML/MIN/1.73M2 — SIGNIFICANT CHANGE UP
EOSINOPHIL # BLD AUTO: 0 K/UL — SIGNIFICANT CHANGE UP (ref 0–0.5)
EOSINOPHIL NFR BLD AUTO: 0 % — SIGNIFICANT CHANGE UP (ref 0–6)
GLUCOSE BLDC GLUCOMTR-MCNC: 137 MG/DL — HIGH (ref 70–99)
GLUCOSE BLDC GLUCOMTR-MCNC: 146 MG/DL — HIGH (ref 70–99)
GLUCOSE BLDC GLUCOMTR-MCNC: 147 MG/DL — HIGH (ref 70–99)
GLUCOSE BLDC GLUCOMTR-MCNC: 153 MG/DL — HIGH (ref 70–99)
GLUCOSE BLDC GLUCOMTR-MCNC: 168 MG/DL — HIGH (ref 70–99)
GLUCOSE BLDC GLUCOMTR-MCNC: 217 MG/DL — HIGH (ref 70–99)
GLUCOSE SERPL-MCNC: 206 MG/DL — HIGH (ref 70–99)
HCT VFR BLD CALC: 37.6 % — LOW (ref 39–50)
HGB BLD-MCNC: 11.9 G/DL — LOW (ref 13–17)
IMM GRANULOCYTES NFR BLD AUTO: 0.7 % — SIGNIFICANT CHANGE UP (ref 0–0.9)
LYMPHOCYTES # BLD AUTO: 0.97 K/UL — LOW (ref 1–3.3)
LYMPHOCYTES # BLD AUTO: 10 % — LOW (ref 13–44)
MCHC RBC-ENTMCNC: 27 PG — SIGNIFICANT CHANGE UP (ref 27–34)
MCHC RBC-ENTMCNC: 31.6 GM/DL — LOW (ref 32–36)
MCV RBC AUTO: 85.5 FL — SIGNIFICANT CHANGE UP (ref 80–100)
MONOCYTES # BLD AUTO: 0.29 K/UL — SIGNIFICANT CHANGE UP (ref 0–0.9)
MONOCYTES NFR BLD AUTO: 3 % — SIGNIFICANT CHANGE UP (ref 2–14)
NEUTROPHILS # BLD AUTO: 8.35 K/UL — HIGH (ref 1.8–7.4)
NEUTROPHILS NFR BLD AUTO: 86 % — HIGH (ref 43–77)
PLATELET # BLD AUTO: 225 K/UL — SIGNIFICANT CHANGE UP (ref 150–400)
POTASSIUM SERPL-MCNC: 4.4 MMOL/L — SIGNIFICANT CHANGE UP (ref 3.5–5.3)
POTASSIUM SERPL-SCNC: 4.4 MMOL/L — SIGNIFICANT CHANGE UP (ref 3.5–5.3)
RBC # BLD: 4.4 M/UL — SIGNIFICANT CHANGE UP (ref 4.2–5.8)
RBC # FLD: 16.4 % — HIGH (ref 10.3–14.5)
SODIUM SERPL-SCNC: 139 MMOL/L — SIGNIFICANT CHANGE UP (ref 135–145)
WBC # BLD: 9.71 K/UL — SIGNIFICANT CHANGE UP (ref 3.8–10.5)
WBC # FLD AUTO: 9.71 K/UL — SIGNIFICANT CHANGE UP (ref 3.8–10.5)

## 2024-05-21 PROCEDURE — 93005 ELECTROCARDIOGRAM TRACING: CPT

## 2024-05-21 PROCEDURE — 97530 THERAPEUTIC ACTIVITIES: CPT | Mod: GP

## 2024-05-21 PROCEDURE — 76000 FLUOROSCOPY <1 HR PHYS/QHP: CPT

## 2024-05-21 PROCEDURE — 86891 AUTOLOGOUS BLOOD OP SALVAGE: CPT

## 2024-05-21 PROCEDURE — 36415 COLL VENOUS BLD VENIPUNCTURE: CPT

## 2024-05-21 PROCEDURE — 97116 GAIT TRAINING THERAPY: CPT | Mod: GP

## 2024-05-21 PROCEDURE — 85025 COMPLETE CBC W/AUTO DIFF WBC: CPT

## 2024-05-21 PROCEDURE — 97161 PT EVAL LOW COMPLEX 20 MIN: CPT | Mod: GP

## 2024-05-21 PROCEDURE — 93010 ELECTROCARDIOGRAM REPORT: CPT

## 2024-05-21 PROCEDURE — 82962 GLUCOSE BLOOD TEST: CPT

## 2024-05-21 PROCEDURE — 99233 SBSQ HOSP IP/OBS HIGH 50: CPT

## 2024-05-21 PROCEDURE — 83735 ASSAY OF MAGNESIUM: CPT

## 2024-05-21 PROCEDURE — 84100 ASSAY OF PHOSPHORUS: CPT

## 2024-05-21 PROCEDURE — 80048 BASIC METABOLIC PNL TOTAL CA: CPT

## 2024-05-21 PROCEDURE — 85027 COMPLETE CBC AUTOMATED: CPT

## 2024-05-21 PROCEDURE — C1889: CPT

## 2024-05-21 RX ORDER — SODIUM CHLORIDE 9 MG/ML
1000 INJECTION, SOLUTION INTRAVENOUS
Refills: 0 | Status: DISCONTINUED | OUTPATIENT
Start: 2024-05-21 | End: 2024-05-25

## 2024-05-21 RX ORDER — OXYCODONE HYDROCHLORIDE 5 MG/1
5 TABLET ORAL
Refills: 0 | Status: DISCONTINUED | OUTPATIENT
Start: 2024-05-21 | End: 2024-05-22

## 2024-05-21 RX ORDER — ONDANSETRON 8 MG/1
4 TABLET, FILM COATED ORAL EVERY 6 HOURS
Refills: 0 | Status: DISCONTINUED | OUTPATIENT
Start: 2024-05-21 | End: 2024-05-21

## 2024-05-21 RX ORDER — METOPROLOL TARTRATE 50 MG
1 TABLET ORAL
Qty: 0 | Refills: 0 | DISCHARGE

## 2024-05-21 RX ORDER — SENNA PLUS 8.6 MG/1
2 TABLET ORAL AT BEDTIME
Refills: 0 | Status: DISCONTINUED | OUTPATIENT
Start: 2024-05-21 | End: 2024-05-25

## 2024-05-21 RX ORDER — LISINOPRIL 2.5 MG/1
40 TABLET ORAL DAILY
Refills: 0 | Status: DISCONTINUED | OUTPATIENT
Start: 2024-05-21 | End: 2024-05-22

## 2024-05-21 RX ORDER — HYDROCHLOROTHIAZIDE 25 MG
1 TABLET ORAL
Qty: 0 | Refills: 0 | DISCHARGE

## 2024-05-21 RX ORDER — INSULIN LISPRO 100/ML
5 VIAL (ML) SUBCUTANEOUS ONCE
Refills: 0 | Status: COMPLETED | OUTPATIENT
Start: 2024-05-21 | End: 2024-05-21

## 2024-05-21 RX ORDER — DEXTROSE 10 % IN WATER 10 %
125 INTRAVENOUS SOLUTION INTRAVENOUS ONCE
Refills: 0 | Status: DISCONTINUED | OUTPATIENT
Start: 2024-05-21 | End: 2024-05-25

## 2024-05-21 RX ORDER — FENTANYL CITRATE 50 UG/ML
50 INJECTION INTRAVENOUS
Refills: 0 | Status: DISCONTINUED | OUTPATIENT
Start: 2024-05-21 | End: 2024-05-21

## 2024-05-21 RX ORDER — SODIUM CHLORIDE 9 MG/ML
1000 INJECTION, SOLUTION INTRAVENOUS
Refills: 0 | Status: DISCONTINUED | OUTPATIENT
Start: 2024-05-21 | End: 2024-05-22

## 2024-05-21 RX ORDER — TRAMADOL HYDROCHLORIDE 50 MG/1
1 TABLET ORAL
Refills: 0 | DISCHARGE

## 2024-05-21 RX ORDER — CEFAZOLIN SODIUM 1 G
2000 VIAL (EA) INJECTION EVERY 8 HOURS
Refills: 0 | Status: COMPLETED | OUTPATIENT
Start: 2024-05-21 | End: 2024-05-22

## 2024-05-21 RX ORDER — ACETAMINOPHEN 500 MG
1000 TABLET ORAL ONCE
Refills: 0 | Status: DISCONTINUED | OUTPATIENT
Start: 2024-05-21 | End: 2024-05-21

## 2024-05-21 RX ORDER — HYDROMORPHONE HYDROCHLORIDE 2 MG/ML
0.5 INJECTION INTRAMUSCULAR; INTRAVENOUS; SUBCUTANEOUS
Refills: 0 | Status: DISCONTINUED | OUTPATIENT
Start: 2024-05-21 | End: 2024-05-21

## 2024-05-21 RX ORDER — GLUCAGON INJECTION, SOLUTION 0.5 MG/.1ML
1 INJECTION, SOLUTION SUBCUTANEOUS ONCE
Refills: 0 | Status: DISCONTINUED | OUTPATIENT
Start: 2024-05-21 | End: 2024-05-25

## 2024-05-21 RX ORDER — DEXTROSE 50 % IN WATER 50 %
25 SYRINGE (ML) INTRAVENOUS ONCE
Refills: 0 | Status: DISCONTINUED | OUTPATIENT
Start: 2024-05-21 | End: 2024-05-25

## 2024-05-21 RX ORDER — ONDANSETRON 8 MG/1
4 TABLET, FILM COATED ORAL EVERY 6 HOURS
Refills: 0 | Status: DISCONTINUED | OUTPATIENT
Start: 2024-05-21 | End: 2024-05-25

## 2024-05-21 RX ORDER — ALIROCUMAB 75 MG/ML
75 INJECTION, SOLUTION SUBCUTANEOUS
Refills: 0 | DISCHARGE

## 2024-05-21 RX ORDER — HYDROMORPHONE HYDROCHLORIDE 2 MG/ML
0.5 INJECTION INTRAMUSCULAR; INTRAVENOUS; SUBCUTANEOUS
Refills: 0 | Status: DISCONTINUED | OUTPATIENT
Start: 2024-05-21 | End: 2024-05-22

## 2024-05-21 RX ORDER — OXYCODONE HYDROCHLORIDE 5 MG/1
10 TABLET ORAL
Refills: 0 | Status: DISCONTINUED | OUTPATIENT
Start: 2024-05-21 | End: 2024-05-22

## 2024-05-21 RX ORDER — CEFAZOLIN SODIUM 1 G
2000 VIAL (EA) INJECTION EVERY 8 HOURS
Refills: 0 | Status: DISCONTINUED | OUTPATIENT
Start: 2024-05-21 | End: 2024-05-21

## 2024-05-21 RX ORDER — METOPROLOL TARTRATE 50 MG
200 TABLET ORAL DAILY
Refills: 0 | Status: DISCONTINUED | OUTPATIENT
Start: 2024-05-21 | End: 2024-05-22

## 2024-05-21 RX ORDER — CLOPIDOGREL BISULFATE 75 MG/1
1 TABLET, FILM COATED ORAL
Refills: 0 | DISCHARGE

## 2024-05-21 RX ORDER — INSULIN LISPRO 100/ML
VIAL (ML) SUBCUTANEOUS AT BEDTIME
Refills: 0 | Status: DISCONTINUED | OUTPATIENT
Start: 2024-05-21 | End: 2024-05-25

## 2024-05-21 RX ORDER — INSULIN LISPRO 100/ML
VIAL (ML) SUBCUTANEOUS
Refills: 0 | Status: DISCONTINUED | OUTPATIENT
Start: 2024-05-21 | End: 2024-05-25

## 2024-05-21 RX ORDER — OXYCODONE HYDROCHLORIDE 5 MG/1
5 TABLET ORAL ONCE
Refills: 0 | Status: DISCONTINUED | OUTPATIENT
Start: 2024-05-21 | End: 2024-05-21

## 2024-05-21 RX ORDER — ACETAMINOPHEN 500 MG
650 TABLET ORAL EVERY 6 HOURS
Refills: 0 | Status: DISCONTINUED | OUTPATIENT
Start: 2024-05-21 | End: 2024-05-25

## 2024-05-21 RX ORDER — HYDROMORPHONE HYDROCHLORIDE 2 MG/ML
30 INJECTION INTRAMUSCULAR; INTRAVENOUS; SUBCUTANEOUS
Refills: 0 | Status: DISCONTINUED | OUTPATIENT
Start: 2024-05-21 | End: 2024-05-22

## 2024-05-21 RX ORDER — DEXTROSE 50 % IN WATER 50 %
12.5 SYRINGE (ML) INTRAVENOUS ONCE
Refills: 0 | Status: DISCONTINUED | OUTPATIENT
Start: 2024-05-21 | End: 2024-05-25

## 2024-05-21 RX ORDER — DEXTROSE 50 % IN WATER 50 %
15 SYRINGE (ML) INTRAVENOUS ONCE
Refills: 0 | Status: DISCONTINUED | OUTPATIENT
Start: 2024-05-21 | End: 2024-05-25

## 2024-05-21 RX ORDER — CYCLOBENZAPRINE HYDROCHLORIDE 10 MG/1
5 TABLET, FILM COATED ORAL EVERY 8 HOURS
Refills: 0 | Status: DISCONTINUED | OUTPATIENT
Start: 2024-05-21 | End: 2024-05-22

## 2024-05-21 RX ORDER — NALOXONE HYDROCHLORIDE 4 MG/.1ML
0.1 SPRAY NASAL
Refills: 0 | Status: DISCONTINUED | OUTPATIENT
Start: 2024-05-21 | End: 2024-05-25

## 2024-05-21 RX ORDER — HYDROMORPHONE HYDROCHLORIDE 2 MG/ML
1 INJECTION INTRAMUSCULAR; INTRAVENOUS; SUBCUTANEOUS
Refills: 0 | Status: DISCONTINUED | OUTPATIENT
Start: 2024-05-21 | End: 2024-05-22

## 2024-05-21 RX ADMIN — HYDROMORPHONE HYDROCHLORIDE 30 MILLILITER(S): 2 INJECTION INTRAMUSCULAR; INTRAVENOUS; SUBCUTANEOUS at 13:12

## 2024-05-21 RX ADMIN — Medication 1: at 16:42

## 2024-05-21 RX ADMIN — SENNA PLUS 2 TABLET(S): 8.6 TABLET ORAL at 22:12

## 2024-05-21 RX ADMIN — Medication 650 MILLIGRAM(S): at 22:48

## 2024-05-21 RX ADMIN — Medication 2000 MILLIGRAM(S): at 16:57

## 2024-05-21 RX ADMIN — CYCLOBENZAPRINE HYDROCHLORIDE 5 MILLIGRAM(S): 10 TABLET, FILM COATED ORAL at 22:12

## 2024-05-21 RX ADMIN — Medication 5 UNIT(S): at 13:16

## 2024-05-21 RX ADMIN — Medication 650 MILLIGRAM(S): at 22:12

## 2024-05-21 RX ADMIN — Medication 2000 MILLIGRAM(S): at 23:02

## 2024-05-21 NOTE — CONSULT NOTE ADULT - SUBJECTIVE AND OBJECTIVE BOX
Patient is a 83y old  Male who presents with a chief complaint of     BRIEF HOSPITAL COURSE: 82 yo male with PMHx  CAD- stents x5, AS, HTN, Hyperlipidemia, and DM II, chronic back pain, spinal stenosis s/p spinal fusion 2015.  Patient scheduled for Exploration of Fusion, Revision of Hardware L2-5, Laminectomy L1/2, L5/S1    s/p laminectomy with osteotomy, fusion of lumbar spine with instrumentation L1/L2, L5/S1, removal of hardware 5/21  EBL 500cc, received 250 cell saver    5/21     PAST MEDICAL & SURGICAL HISTORY:  Type 2 diabetes mellitus  Hypertension  Hyperlipidemia  Renal calculus  Renal cyst  CAD (coronary artery disease)  Aortic stenosis, mild  2008  BPH (benign prostatic hypertrophy)  Spinal stenosis of lumbar region  s/p ZHOU x 2  Sciatica  PAF (paroxysmal atrial fibrillation)  Stented coronary artery  2/28/08 OM2 x 1; 3/13/2008 x 3 stents  S/P T&A (status post tonsillectomy and adenoidectomy)  H/O lithotripsy  2013  S/P lumbar spinal fusion  H/O bilateral cataract extraction      Medications:  acetaminophen   IVPB .. 1000 milliGRAM(s) IV Intermittent once  fentaNYL    Injectable 50 MICROGram(s) IV Push every 5 minutes PRN  HYDROmorphone  Injectable 0.5 milliGRAM(s) IV Push every 10 minutes PRN  HYDROmorphone PCA (1 mG/mL) 30 milliLiter(s) PCA Continuous PCA Continuous  HYDROmorphone PCA (1 mG/mL) Rescue Clinician Bolus 0.5 milliGRAM(s) IV Push every 15 minutes PRN  ondansetron Injectable 4 milliGRAM(s) IV Push every 6 hours PRN  ondansetron Injectable 4 milliGRAM(s) IV Push every 6 hours PRN  oxyCODONE    IR 5 milliGRAM(s) Oral once PRN  naloxone Injectable 0.1 milliGRAM(s) IV Push every 3 minutes PRN      ICU Vital Signs Last 24 Hrs  T(C): 36.4 (21 May 2024 13:05), Max: 36.4 (21 May 2024 06:48)  T(F): 97.6 (21 May 2024 13:05), Max: 97.6 (21 May 2024 06:48)  HR: 66 (21 May 2024 14:30) (62 - 70)  BP: 138/64 (21 May 2024 14:30) (138/64 - 156/86)  BP(mean): --  ABP: --  ABP(mean): --  RR: 17 (21 May 2024 14:30) (12 - 22)  SpO2: 99% (21 May 2024 14:30) (97% - 99%)    O2 Parameters below as of 21 May 2024 14:30  Patient On (Oxygen Delivery Method): nasal cannula  O2 Flow (L/min): 2      I&O's Detail    21 May 2024 07:01  -  21 May 2024 15:19  --------------------------------------------------------  IN:    Other (mL): 1700 mL  Total IN: 1700 mL    OUT:    Drain (mL): 150 mL    Indwelling Catheter - Urethral (mL): 200 mL    Other (mL): 250 mL  Total OUT: 600 mL    Total NET: 1100 mL      LABS:        CAPILLARY BLOOD GLUCOSE  POCT Blood Glucose.: 217 mg/dL (21 May 2024 13:06)    CULTURES:    Physical Examination:    General: No acute distress.    HEENT: Pupils equal, reactive to light.  Symmetric.  PULM: Clear to auscultation bilaterally,   CVS: Regular rate and rhythm, no murmurs  ABD: Soft, nondistended, nontender, normoactive bowel sounds,  EXT: No edema, nontender  SKIN: Warm and well perfused, no rashes noted.  NEURO: Alert, oriented, interactive    DEVICES:     RADIOLOGY:   Patient is a 83y old  Male who presents with a chief complaint of     BRIEF HOSPITAL COURSE: 82 yo male with PMHx  CAD- stents x5, AS, HTN, Hyperlipidemia, and DM II, chronic back pain, spinal stenosis s/p spinal fusion 2015.  Patient scheduled for Exploration of Fusion, Revision of Hardware L2-5, Laminectomy L1/2, L5/S1    s/p laminectomy with osteotomy, fusion of lumbar spine with instrumentation L1/L2, L5/S1, removal of hardware 5/21  EBL 500cc, received 250 cell saver    5/21 pt seen in sicu, lethargic d/t pain meds but easily arousable. states the lower back pain 7/10, denies numbness tingling wiggling toes. sensation to light touch intact.     PAST MEDICAL & SURGICAL HISTORY:  Type 2 diabetes mellitus  Hypertension  Hyperlipidemia  Renal calculus  Renal cyst  CAD (coronary artery disease)  Aortic stenosis, mild  2008  BPH (benign prostatic hypertrophy)  Spinal stenosis of lumbar region  s/p ZHOU x 2  Sciatica  PAF (paroxysmal atrial fibrillation)  Stented coronary artery  2/28/08 OM2 x 1; 3/13/2008 x 3 stents  S/P T&A (status post tonsillectomy and adenoidectomy)  H/O lithotripsy  2013  S/P lumbar spinal fusion  H/O bilateral cataract extraction      Medications:  acetaminophen   IVPB .. 1000 milliGRAM(s) IV Intermittent once  fentaNYL    Injectable 50 MICROGram(s) IV Push every 5 minutes PRN  HYDROmorphone  Injectable 0.5 milliGRAM(s) IV Push every 10 minutes PRN  HYDROmorphone PCA (1 mG/mL) 30 milliLiter(s) PCA Continuous PCA Continuous  HYDROmorphone PCA (1 mG/mL) Rescue Clinician Bolus 0.5 milliGRAM(s) IV Push every 15 minutes PRN  ondansetron Injectable 4 milliGRAM(s) IV Push every 6 hours PRN  ondansetron Injectable 4 milliGRAM(s) IV Push every 6 hours PRN  oxyCODONE    IR 5 milliGRAM(s) Oral once PRN  naloxone Injectable 0.1 milliGRAM(s) IV Push every 3 minutes PRN      ICU Vital Signs Last 24 Hrs  T(C): 36.4 (21 May 2024 13:05), Max: 36.4 (21 May 2024 06:48)  T(F): 97.6 (21 May 2024 13:05), Max: 97.6 (21 May 2024 06:48)  HR: 66 (21 May 2024 14:30) (62 - 70)  BP: 138/64 (21 May 2024 14:30) (138/64 - 156/86)  BP(mean): --  ABP: --  ABP(mean): --  RR: 17 (21 May 2024 14:30) (12 - 22)  SpO2: 99% (21 May 2024 14:30) (97% - 99%)    O2 Parameters below as of 21 May 2024 14:30  Patient On (Oxygen Delivery Method): nasal cannula  O2 Flow (L/min): 2      I&O's Detail    21 May 2024 07:01  -  21 May 2024 15:19  --------------------------------------------------------  IN:    Other (mL): 1700 mL  Total IN: 1700 mL    OUT:    Drain (mL): 150 mL    Indwelling Catheter - Urethral (mL): 200 mL    Other (mL): 250 mL  Total OUT: 600 mL    Total NET: 1100 mL      LABS:      CAPILLARY BLOOD GLUCOSE  POCT Blood Glucose.: 217 mg/dL (21 May 2024 13:06)    CULTURES:    Physical Examination:    General: No acute distress.    PULM: Clear to auscultation bilaterally,   CVS: Regular rate and rhythm, + loud systolic murmur in RUSB  ABD: Soft, nondistended, nontender  EXT: No LE edema b/l  SKIN: Warm and well perfused  NEURO: lethagic from narcotics    DEVICES:   shahida CUADRA     RADIOLOGY:

## 2024-05-21 NOTE — PATIENT PROFILE ADULT - FUNCTIONAL ASSESSMENT - DAILY ACTIVITY 4.
[Healthy eating counseling provided] : healthy eating [Activity counseling provided] : activity [Good understanding] : Patient has a good understanding of lifestyle changes and the steps needed to achieve self management goals [ - Annual Depression Screening] : Annual Depression Screening [de-identified] : active lifestyle\par diet - will work on reducing carbs 4 = No assist / stand by assistance

## 2024-05-21 NOTE — CONSULT NOTE ADULT - ASSESSMENT
ASSESSMENT  84 yo male with PMHx  CAD- stents x5, AS, HTN, Hyperlipidemia, and DM II, chronic back pain, spinal stenosis s/p spinal fusion 2015.  Patient scheduled for Exploration of Fusion, Revision of Hardware L2-5, Laminectomy L1/2, L5/S1    s/p laminectomy with osteotomy, fusion of lumbar spine with instrumentation L1/L2, L5/S1, removal of hardware 5/21  EBL 500cc, received 250 cell saver    PLAN  - pain control prn dilaudid PCA, IV tylenol, flexeril, gabapentin  - monitor BP. cont acei and toprol. hold eliquis and plavix.   - encourage incentive spirometer  - adv diet as per surgery team. bowel regimen prn  - cont (IV fluids)  monitor I & Os   - drain mgmt as per sx. trend output  - post op abx IV ancef. monitor WBC and temps  - no chemical DVT ppx d/t bleeding risk , recent surgery. cont SCDs  PT eval  ASSESSMENT  84 yo male with PMHx  CAD- stents x5, AS, HTN, Hyperlipidemia, and DM II, chronic back pain, spinal stenosis s/p spinal fusion 2015.  Patient scheduled for Exploration of Fusion, Revision of Hardware L2-5, Laminectomy L1/2, L5/S1    s/p laminectomy with osteotomy, fusion of lumbar spine with instrumentation L1/L2, L5/S1, removal of hardware 5/21  EBL 500cc, received 250 cell saver    PLAN  - pain control prn dilaudid PCA, IV tylenol, flexeril, gabapentin  - Normotensive. cont acei and toprol. hold eliquis and plavix.   - on 2L NC sating 100%, trial on room air. encourage incentive spirometer  - adv diet as per surgery team. bowel regimen prn  - cont  monitor I & Os   - VENECIA drain mgmt as per sx. monitor output  - BGMs ac hs. ISS. hold glymepiride, janumet.  - post op abx IV ancef. monitor WBC and temps  - check post op labs. no chemical DVT ppx d/t bleeding risk , recent surgery. cont SCDs  PT eval     Dispo: SICU. pain control prn. monitor VENECIA output. PT    Will discuss with Dr. Guerrero, d/w Dr. Putnam ASSESSMENT  82 yo male with PMHx  CAD- stents x5, AS, HTN, Hyperlipidemia, and DM II, chronic back pain, spinal stenosis s/p spinal fusion 2015.  Patient scheduled for Exploration of Fusion, Revision of Hardware L2-5, Laminectomy L1/2, L5/S1    s/p laminectomy with osteotomy, fusion of lumbar spine with instrumentation L1/L2, L5/S1, removal of hardware 5/21  EBL 500cc, received 250 cell saver    PLAN  - pain control prn dilaudid PCA, IV tylenol, flexeril, gabapentin  - Normotensive. cont acei and toprol. hold eliquis and plavix. TTE 2023 EF 60%, moderate AS.   - on 2L NC sating 100%, trial on room air. encourage incentive spirometer  - adv diet as per surgery team. bowel regimen prn  - pinedo in place. be cautious with IV fluids as pt with mod AS.  monitor I & Os   - VENECIA drain mgmt as per sx. monitor output  - BGMs ac hs. ISS. hold glymepiride, janumet.  - post op abx IV ancef. monitor WBC and temps  - check post op labs. no chemical DVT ppx d/t bleeding risk , recent surgery. cont SCDs  PT eval     Dispo: SICU. pain control prn. monitor VENECIA output. PT    Will discuss with Dr. Guerrero, d/w Dr. Putnam

## 2024-05-21 NOTE — PATIENT PROFILE ADULT - PATIENT REPRESENTATIVE: ( YOU CAN CHOOSE ANY PERSON THAT CAN ASSIST YOU WITH YOUR HEALTH CARE PREFERENCES, DOES NOT HAVE TO BE A SPOUSE, IMMEDIATE FAMILY OR SIGNIFICANT OTHER/PARTNER)
Hpi Title: Evaluation of Skin Lesions
How Severe Are Your Spot(S)?: mild
Have Your Spot(S) Been Treated In The Past?: has not been treated
Family Member: Grandfather
Additional History: Last seen 04/24/18 by KELLIE
yes

## 2024-05-21 NOTE — BRIEF OPERATIVE NOTE - NSICDXBRIEFPREOP_GEN_ALL_CORE_FT
PRE-OP DIAGNOSIS:  Spinal stenosis of lumbar region with neurogenic claudication 21-May-2024 12:55:34  Harish Putnam  Lumbar postlaminectomy syndrome 21-May-2024 12:55:42  Harish Putnam

## 2024-05-21 NOTE — BRIEF OPERATIVE NOTE - NSICDXBRIEFPOSTOP_GEN_ALL_CORE_FT
POST-OP DIAGNOSIS:  Lumbar postlaminectomy syndrome 21-May-2024 12:55:55  Harish Putnam  Spinal stenosis of lumbar region with neurogenic claudication 21-May-2024 12:56:07  Harish Putnam

## 2024-05-21 NOTE — PATIENT PROFILE ADULT - FALL HARM RISK - HARM RISK INTERVENTIONS

## 2024-05-21 NOTE — BRIEF OPERATIVE NOTE - NSICDXBRIEFPROCEDURE_GEN_ALL_CORE_FT
PROCEDURES:  Laminectomy with osteotomy and fusion of lumbar spine with instrumentation 21-May-2024 12:55:20  Harish Putnam

## 2024-05-21 NOTE — PATIENT PROFILE ADULT - HOW PATIENT ADDRESSED, PROFILE
[facility] attempted to reach pt no answer. I have been unsuccessful reaching pt since May 2020 After reviewing pt calls I notice pt call July for clarification of medication and RN was not able to get a hold of pt.  I mailed a no response letter last month and h
Matthias

## 2024-05-21 NOTE — ASU PREOP CHECKLIST - ADVANCE DIRECTIVE ADDRESSED/READDRESSED
Problem: Skin Integrity  Goal: Risk for impaired skin integrity will decrease    Intervention: Assess risk factors for impaired skin integrity and/or pressure ulcers  Encouraged and advises Pt to turn side to sides.         done

## 2024-05-22 DIAGNOSIS — M96.1 POSTLAMINECTOMY SYNDROME, NOT ELSEWHERE CLASSIFIED: ICD-10-CM

## 2024-05-22 LAB
ANION GAP SERPL CALC-SCNC: 5 MMOL/L — SIGNIFICANT CHANGE UP (ref 5–17)
BASOPHILS # BLD AUTO: 0.05 K/UL — SIGNIFICANT CHANGE UP (ref 0–0.2)
BASOPHILS NFR BLD AUTO: 0.6 % — SIGNIFICANT CHANGE UP (ref 0–2)
BUN SERPL-MCNC: 17 MG/DL — SIGNIFICANT CHANGE UP (ref 7–23)
CALCIUM SERPL-MCNC: 8.4 MG/DL — LOW (ref 8.5–10.1)
CHLORIDE SERPL-SCNC: 106 MMOL/L — SIGNIFICANT CHANGE UP (ref 96–108)
CO2 SERPL-SCNC: 28 MMOL/L — SIGNIFICANT CHANGE UP (ref 22–31)
CREAT SERPL-MCNC: 1.22 MG/DL — SIGNIFICANT CHANGE UP (ref 0.5–1.3)
EGFR: 59 ML/MIN/1.73M2 — LOW
EOSINOPHIL # BLD AUTO: 0.02 K/UL — SIGNIFICANT CHANGE UP (ref 0–0.5)
EOSINOPHIL NFR BLD AUTO: 0.2 % — SIGNIFICANT CHANGE UP (ref 0–6)
GLUCOSE BLDC GLUCOMTR-MCNC: 157 MG/DL — HIGH (ref 70–99)
GLUCOSE BLDC GLUCOMTR-MCNC: 162 MG/DL — HIGH (ref 70–99)
GLUCOSE BLDC GLUCOMTR-MCNC: 236 MG/DL — HIGH (ref 70–99)
GLUCOSE BLDC GLUCOMTR-MCNC: 251 MG/DL — HIGH (ref 70–99)
GLUCOSE SERPL-MCNC: 171 MG/DL — HIGH (ref 70–99)
HCT VFR BLD CALC: 38.3 % — LOW (ref 39–50)
HGB BLD-MCNC: 11.9 G/DL — LOW (ref 13–17)
IMM GRANULOCYTES NFR BLD AUTO: 0.7 % — SIGNIFICANT CHANGE UP (ref 0–0.9)
LYMPHOCYTES # BLD AUTO: 1.41 K/UL — SIGNIFICANT CHANGE UP (ref 1–3.3)
LYMPHOCYTES # BLD AUTO: 17.5 % — SIGNIFICANT CHANGE UP (ref 13–44)
MAGNESIUM SERPL-MCNC: 1.7 MG/DL — SIGNIFICANT CHANGE UP (ref 1.6–2.6)
MCHC RBC-ENTMCNC: 26.7 PG — LOW (ref 27–34)
MCHC RBC-ENTMCNC: 31.1 GM/DL — LOW (ref 32–36)
MCV RBC AUTO: 85.9 FL — SIGNIFICANT CHANGE UP (ref 80–100)
MONOCYTES # BLD AUTO: 0.75 K/UL — SIGNIFICANT CHANGE UP (ref 0–0.9)
MONOCYTES NFR BLD AUTO: 9.3 % — SIGNIFICANT CHANGE UP (ref 2–14)
NEUTROPHILS # BLD AUTO: 5.75 K/UL — SIGNIFICANT CHANGE UP (ref 1.8–7.4)
NEUTROPHILS NFR BLD AUTO: 71.7 % — SIGNIFICANT CHANGE UP (ref 43–77)
PHOSPHATE SERPL-MCNC: 2.7 MG/DL — SIGNIFICANT CHANGE UP (ref 2.5–4.5)
PLATELET # BLD AUTO: 223 K/UL — SIGNIFICANT CHANGE UP (ref 150–400)
POTASSIUM SERPL-MCNC: 4 MMOL/L — SIGNIFICANT CHANGE UP (ref 3.5–5.3)
POTASSIUM SERPL-SCNC: 4 MMOL/L — SIGNIFICANT CHANGE UP (ref 3.5–5.3)
RBC # BLD: 4.46 M/UL — SIGNIFICANT CHANGE UP (ref 4.2–5.8)
RBC # FLD: 16.4 % — HIGH (ref 10.3–14.5)
SODIUM SERPL-SCNC: 139 MMOL/L — SIGNIFICANT CHANGE UP (ref 135–145)
WBC # BLD: 8.04 K/UL — SIGNIFICANT CHANGE UP (ref 3.8–10.5)
WBC # FLD AUTO: 8.04 K/UL — SIGNIFICANT CHANGE UP (ref 3.8–10.5)

## 2024-05-22 RX ORDER — HYDROMORPHONE HYDROCHLORIDE 2 MG/ML
0.5 INJECTION INTRAMUSCULAR; INTRAVENOUS; SUBCUTANEOUS EVERY 6 HOURS
Refills: 0 | Status: DISCONTINUED | OUTPATIENT
Start: 2024-05-22 | End: 2024-05-25

## 2024-05-22 RX ORDER — CYCLOBENZAPRINE HYDROCHLORIDE 10 MG/1
5 TABLET, FILM COATED ORAL THREE TIMES A DAY
Refills: 0 | Status: DISCONTINUED | OUTPATIENT
Start: 2024-05-22 | End: 2024-05-25

## 2024-05-22 RX ORDER — ACETAMINOPHEN 500 MG
1000 TABLET ORAL ONCE
Refills: 0 | Status: DISCONTINUED | OUTPATIENT
Start: 2024-05-22 | End: 2024-05-25

## 2024-05-22 RX ORDER — LISINOPRIL 2.5 MG/1
40 TABLET ORAL DAILY
Refills: 0 | Status: DISCONTINUED | OUTPATIENT
Start: 2024-05-22 | End: 2024-05-25

## 2024-05-22 RX ORDER — METOPROLOL TARTRATE 50 MG
100 TABLET ORAL DAILY
Refills: 0 | Status: DISCONTINUED | OUTPATIENT
Start: 2024-05-22 | End: 2024-05-24

## 2024-05-22 RX ORDER — TRAMADOL HYDROCHLORIDE 50 MG/1
25 TABLET ORAL EVERY 6 HOURS
Refills: 0 | Status: DISCONTINUED | OUTPATIENT
Start: 2024-05-22 | End: 2024-05-25

## 2024-05-22 RX ORDER — OXYCODONE HYDROCHLORIDE 5 MG/1
5 TABLET ORAL EVERY 4 HOURS
Refills: 0 | Status: DISCONTINUED | OUTPATIENT
Start: 2024-05-22 | End: 2024-05-25

## 2024-05-22 RX ADMIN — Medication 1: at 22:12

## 2024-05-22 RX ADMIN — Medication 650 MILLIGRAM(S): at 08:42

## 2024-05-22 RX ADMIN — Medication 650 MILLIGRAM(S): at 16:07

## 2024-05-22 RX ADMIN — OXYCODONE HYDROCHLORIDE 5 MILLIGRAM(S): 5 TABLET ORAL at 15:35

## 2024-05-22 RX ADMIN — Medication 1: at 08:43

## 2024-05-22 RX ADMIN — Medication 100 MILLIGRAM(S): at 00:43

## 2024-05-22 RX ADMIN — OXYCODONE HYDROCHLORIDE 5 MILLIGRAM(S): 5 TABLET ORAL at 20:37

## 2024-05-22 RX ADMIN — OXYCODONE HYDROCHLORIDE 10 MILLIGRAM(S): 5 TABLET ORAL at 13:44

## 2024-05-22 RX ADMIN — Medication 650 MILLIGRAM(S): at 16:37

## 2024-05-22 RX ADMIN — Medication 2: at 17:32

## 2024-05-22 RX ADMIN — OXYCODONE HYDROCHLORIDE 5 MILLIGRAM(S): 5 TABLET ORAL at 21:37

## 2024-05-22 RX ADMIN — Medication 1: at 13:04

## 2024-05-22 RX ADMIN — LISINOPRIL 40 MILLIGRAM(S): 2.5 TABLET ORAL at 08:43

## 2024-05-22 RX ADMIN — CYCLOBENZAPRINE HYDROCHLORIDE 5 MILLIGRAM(S): 10 TABLET, FILM COATED ORAL at 08:42

## 2024-05-22 RX ADMIN — Medication 100 MILLIGRAM(S): at 10:25

## 2024-05-22 RX ADMIN — SENNA PLUS 2 TABLET(S): 8.6 TABLET ORAL at 22:12

## 2024-05-22 RX ADMIN — Medication 650 MILLIGRAM(S): at 09:12

## 2024-05-22 RX ADMIN — Medication 1 TABLET(S): at 10:25

## 2024-05-22 RX ADMIN — Medication 650 MILLIGRAM(S): at 22:12

## 2024-05-22 RX ADMIN — CYCLOBENZAPRINE HYDROCHLORIDE 5 MILLIGRAM(S): 10 TABLET, FILM COATED ORAL at 15:35

## 2024-05-22 RX ADMIN — OXYCODONE HYDROCHLORIDE 5 MILLIGRAM(S): 5 TABLET ORAL at 14:10

## 2024-05-22 RX ADMIN — OXYCODONE HYDROCHLORIDE 10 MILLIGRAM(S): 5 TABLET ORAL at 11:28

## 2024-05-22 NOTE — PROVIDER CONTACT NOTE (OTHER) - SITUATION
Pt with increased confusion and hallucination, states hearing a "typewriter". On PCA dilaudid. Pt requesting PCA button to be taken away and be placed on the floor.  Hypertension 183/105.

## 2024-05-22 NOTE — PHYSICAL THERAPY INITIAL EVALUATION ADULT - PRECAUTIONS/LIMITATIONS, REHAB EVAL
PT order indicates brace prn, Dr. Putnam note indicates "oob w brace"- will enforce LSO when oob/fall precautions/spinal precautions/surgical precautions

## 2024-05-22 NOTE — PHYSICAL THERAPY INITIAL EVALUATION ADULT - DID THE PATIENT HAVE SURGERY?
s/p laminectomy with osteotomy, fusion of lumbar spine with instrumentation L1/L2, L5/S1, removal of hardware 5/21

## 2024-05-22 NOTE — PHYSICAL THERAPY INITIAL EVALUATION ADULT - DIAGNOSIS, PT EVAL
Lumbar postlaminectomy syndrome, s/p laminectomy with osteotomy, fusion of lumbar spine with instrumentation L1/L2, L5/S1, removal of hardware

## 2024-05-22 NOTE — PROVIDER CONTACT NOTE (CHANGE IN STATUS NOTIFICATION) - SITUATION
Pt with increased confusion/agitation and paranoid. Refusing to take blood pressure medication from staff. Demanding to speak to doctor.

## 2024-05-22 NOTE — PROGRESS NOTE ADULT - ASSESSMENT
ASSESSMENT  84 yo male with PMHx  CAD- stents x5, AS, HTN, Hyperlipidemia, and DM II, chronic back pain, spinal stenosis s/p spinal fusion 2015.  Patient scheduled for Exploration of Fusion, Revision of Hardware L2-5, Laminectomy L1/2, L5/S1    s/p laminectomy with osteotomy, fusion of lumbar spine with instrumentation L1/L2, L5/S1, removal of hardware 5/21  EBL 500cc, received 250 cell saver    PLAN  - confused and agitated overnight, pain control prn IV tylenol, transition off dilaudid  pca, dc flexeril  - HYpertensive overnight, suspect combination of agitation, pain and missed anti htn meds.  cont acei and toprol. hold eliquis and plavix until ok with ortho spine.  TTE 2023 EF 60%, moderate AS.   - on room air. encourage incentive spirometer  - PO diet.. bowel regimen prn  - pinedo in place. dc IV fuids.  monitor I & Os   - VENECIA drain mgmt as per sx. monitor output  - BGMs ac hs. ISS. hold glymepiride, janumet.  - post op abx IV ancef. monitor WBC and temps  - Hgbstable.  no chemical DVT ppx d/t bleeding risk , recent surgery. cont SCDs  PT eval     Dispo: SICU. pain control prn, will try to cut back on narcotics. monitor VENECIA output. PT. restart eliquis, plavix when ok with ortho spine    Will discuss with Dr. Phoenix

## 2024-05-22 NOTE — PHYSICAL THERAPY INITIAL EVALUATION ADULT - PERTINENT HX OF CURRENT PROBLEM, REHAB EVAL
s/p Laminectomy with osteotomy and fusion of lumbar spine with instrumentation. ON w/ hypertension and confusion/hallucination, agitation.

## 2024-05-22 NOTE — PHYSICAL THERAPY INITIAL EVALUATION ADULT - ADDITIONAL COMMENTS
reports 1 level house w/ 10 steps to enter from garage- pt unclear indicating these to be outside steps but from basement level

## 2024-05-22 NOTE — PROGRESS NOTE ADULT - NS ATTEND AMEND GEN_ALL_CORE FT
82 y/o M with hx of CAD, spinal stenosis, present for laminectomy  and spinal fusion of L1/L2, L5/S1  PCA pump discontinued  patient agitation improves  continue PO pain management  yarelis drain management per surgery team   PT, incentive spirometer  remain in step down for now

## 2024-05-22 NOTE — PHYSICAL THERAPY INITIAL EVALUATION ADULT - GENERAL OBSERVATIONS, REHAB EVAL
pt rec'd supine in bed on SDU, IV- PCA being DC'd, VENECIA pinedo, monitors, SCDs, LSO at bedside. pt calm, little confused, self-distracting, decreased safery awareness pt rec'd supine in bed on SDU, IV- PCA being DC'd, VENECIA pinedo, monitors, SCDs, LSO at bedside. pt calm, little confused, self-distracting, decreased safery awareness, playing with/fixated on monitors/lines

## 2024-05-22 NOTE — PHYSICAL THERAPY INITIAL EVALUATION ADULT - MODALITIES TREATMENT COMMENTS
pt left sitting in BS recliner, LSO, VENECIA, pinedo, monitors, IV, CBIR, instr to not get up w/o assist, chair alarm

## 2024-05-23 LAB
ANION GAP SERPL CALC-SCNC: 7 MMOL/L — SIGNIFICANT CHANGE UP (ref 5–17)
BASOPHILS # BLD AUTO: 0.08 K/UL — SIGNIFICANT CHANGE UP (ref 0–0.2)
BASOPHILS NFR BLD AUTO: 0.7 % — SIGNIFICANT CHANGE UP (ref 0–2)
BUN SERPL-MCNC: 22 MG/DL — SIGNIFICANT CHANGE UP (ref 7–23)
CALCIUM SERPL-MCNC: 9.1 MG/DL — SIGNIFICANT CHANGE UP (ref 8.5–10.1)
CHLORIDE SERPL-SCNC: 108 MMOL/L — SIGNIFICANT CHANGE UP (ref 96–108)
CO2 SERPL-SCNC: 26 MMOL/L — SIGNIFICANT CHANGE UP (ref 22–31)
CREAT SERPL-MCNC: 1.16 MG/DL — SIGNIFICANT CHANGE UP (ref 0.5–1.3)
EGFR: 63 ML/MIN/1.73M2 — SIGNIFICANT CHANGE UP
EOSINOPHIL # BLD AUTO: 0.03 K/UL — SIGNIFICANT CHANGE UP (ref 0–0.5)
EOSINOPHIL NFR BLD AUTO: 0.3 % — SIGNIFICANT CHANGE UP (ref 0–6)
GLUCOSE BLDC GLUCOMTR-MCNC: 185 MG/DL — HIGH (ref 70–99)
GLUCOSE BLDC GLUCOMTR-MCNC: 252 MG/DL — HIGH (ref 70–99)
GLUCOSE BLDC GLUCOMTR-MCNC: 265 MG/DL — HIGH (ref 70–99)
GLUCOSE BLDC GLUCOMTR-MCNC: 320 MG/DL — HIGH (ref 70–99)
GLUCOSE SERPL-MCNC: 230 MG/DL — HIGH (ref 70–99)
HCT VFR BLD CALC: 36.2 % — LOW (ref 39–50)
HGB BLD-MCNC: 11.4 G/DL — LOW (ref 13–17)
IMM GRANULOCYTES NFR BLD AUTO: 1.2 % — HIGH (ref 0–0.9)
LYMPHOCYTES # BLD AUTO: 1.19 K/UL — SIGNIFICANT CHANGE UP (ref 1–3.3)
LYMPHOCYTES # BLD AUTO: 10.5 % — LOW (ref 13–44)
MCHC RBC-ENTMCNC: 26.7 PG — LOW (ref 27–34)
MCHC RBC-ENTMCNC: 31.5 GM/DL — LOW (ref 32–36)
MCV RBC AUTO: 84.8 FL — SIGNIFICANT CHANGE UP (ref 80–100)
MONOCYTES # BLD AUTO: 1.1 K/UL — HIGH (ref 0–0.9)
MONOCYTES NFR BLD AUTO: 9.7 % — SIGNIFICANT CHANGE UP (ref 2–14)
NEUTROPHILS # BLD AUTO: 8.84 K/UL — HIGH (ref 1.8–7.4)
NEUTROPHILS NFR BLD AUTO: 77.6 % — HIGH (ref 43–77)
PLATELET # BLD AUTO: 238 K/UL — SIGNIFICANT CHANGE UP (ref 150–400)
POTASSIUM SERPL-MCNC: 4.2 MMOL/L — SIGNIFICANT CHANGE UP (ref 3.5–5.3)
POTASSIUM SERPL-SCNC: 4.2 MMOL/L — SIGNIFICANT CHANGE UP (ref 3.5–5.3)
RBC # BLD: 4.27 M/UL — SIGNIFICANT CHANGE UP (ref 4.2–5.8)
RBC # FLD: 16.7 % — HIGH (ref 10.3–14.5)
SODIUM SERPL-SCNC: 141 MMOL/L — SIGNIFICANT CHANGE UP (ref 135–145)
WBC # BLD: 11.38 K/UL — HIGH (ref 3.8–10.5)
WBC # FLD AUTO: 11.38 K/UL — HIGH (ref 3.8–10.5)

## 2024-05-23 RX ORDER — CHLORHEXIDINE GLUCONATE 213 G/1000ML
1 SOLUTION TOPICAL
Refills: 0 | Status: DISCONTINUED | OUTPATIENT
Start: 2024-05-23 | End: 2024-05-25

## 2024-05-23 RX ORDER — SODIUM CHLORIDE 9 MG/ML
3 INJECTION INTRAMUSCULAR; INTRAVENOUS; SUBCUTANEOUS EVERY 8 HOURS
Refills: 0 | Status: DISCONTINUED | OUTPATIENT
Start: 2024-05-23 | End: 2024-05-25

## 2024-05-23 RX ADMIN — CHLORHEXIDINE GLUCONATE 1 APPLICATION(S): 213 SOLUTION TOPICAL at 13:24

## 2024-05-23 RX ADMIN — OXYCODONE HYDROCHLORIDE 5 MILLIGRAM(S): 5 TABLET ORAL at 08:30

## 2024-05-23 RX ADMIN — Medication 650 MILLIGRAM(S): at 08:27

## 2024-05-23 RX ADMIN — Medication 650 MILLIGRAM(S): at 09:00

## 2024-05-23 RX ADMIN — Medication 3: at 12:04

## 2024-05-23 RX ADMIN — CYCLOBENZAPRINE HYDROCHLORIDE 5 MILLIGRAM(S): 10 TABLET, FILM COATED ORAL at 14:47

## 2024-05-23 RX ADMIN — HYDROMORPHONE HYDROCHLORIDE 0.5 MILLIGRAM(S): 2 INJECTION INTRAMUSCULAR; INTRAVENOUS; SUBCUTANEOUS at 14:51

## 2024-05-23 RX ADMIN — OXYCODONE HYDROCHLORIDE 5 MILLIGRAM(S): 5 TABLET ORAL at 22:30

## 2024-05-23 RX ADMIN — OXYCODONE HYDROCHLORIDE 5 MILLIGRAM(S): 5 TABLET ORAL at 21:55

## 2024-05-23 RX ADMIN — OXYCODONE HYDROCHLORIDE 5 MILLIGRAM(S): 5 TABLET ORAL at 17:43

## 2024-05-23 RX ADMIN — SODIUM CHLORIDE 3 MILLILITER(S): 9 INJECTION INTRAMUSCULAR; INTRAVENOUS; SUBCUTANEOUS at 13:20

## 2024-05-23 RX ADMIN — OXYCODONE HYDROCHLORIDE 5 MILLIGRAM(S): 5 TABLET ORAL at 04:27

## 2024-05-23 RX ADMIN — OXYCODONE HYDROCHLORIDE 5 MILLIGRAM(S): 5 TABLET ORAL at 09:15

## 2024-05-23 RX ADMIN — Medication 2: at 21:55

## 2024-05-23 RX ADMIN — Medication 650 MILLIGRAM(S): at 17:37

## 2024-05-23 RX ADMIN — Medication 100 MILLIGRAM(S): at 08:26

## 2024-05-23 RX ADMIN — Medication 1: at 08:28

## 2024-05-23 RX ADMIN — Medication 1 TABLET(S): at 08:26

## 2024-05-23 RX ADMIN — OXYCODONE HYDROCHLORIDE 5 MILLIGRAM(S): 5 TABLET ORAL at 13:23

## 2024-05-23 RX ADMIN — HYDROMORPHONE HYDROCHLORIDE 0.5 MILLIGRAM(S): 2 INJECTION INTRAMUSCULAR; INTRAVENOUS; SUBCUTANEOUS at 15:15

## 2024-05-23 RX ADMIN — LISINOPRIL 40 MILLIGRAM(S): 2.5 TABLET ORAL at 08:26

## 2024-05-23 RX ADMIN — OXYCODONE HYDROCHLORIDE 5 MILLIGRAM(S): 5 TABLET ORAL at 18:40

## 2024-05-23 RX ADMIN — Medication 650 MILLIGRAM(S): at 04:27

## 2024-05-23 RX ADMIN — SENNA PLUS 2 TABLET(S): 8.6 TABLET ORAL at 21:55

## 2024-05-23 RX ADMIN — OXYCODONE HYDROCHLORIDE 5 MILLIGRAM(S): 5 TABLET ORAL at 08:26

## 2024-05-23 RX ADMIN — Medication 3: at 17:38

## 2024-05-23 RX ADMIN — OXYCODONE HYDROCHLORIDE 5 MILLIGRAM(S): 5 TABLET ORAL at 14:15

## 2024-05-23 RX ADMIN — SODIUM CHLORIDE 3 MILLILITER(S): 9 INJECTION INTRAMUSCULAR; INTRAVENOUS; SUBCUTANEOUS at 21:51

## 2024-05-23 NOTE — PROGRESS NOTE ADULT - TIME BILLING
greater than 50% of time spent reviewing labs, notes, orders and radiographs, coordinating care  discussed with nursing, primary team, consultant
greater than 50% of time spent reviewing labs, notes, orders and radiographs, coordinating care  discussed with nursing, ICU PA, and consultant

## 2024-05-23 NOTE — PROGRESS NOTE ADULT - ASSESSMENT
ASSESSMENT  84 yo male with PMHx  CAD- stents x5, AS, HTN, Hyperlipidemia, and DM II, chronic back pain, spinal stenosis s/p spinal fusion 2015.  Patient scheduled for Exploration of Fusion, Revision of Hardware L2-5, Laminectomy L1/2, L5/S1    s/p laminectomy with osteotomy, fusion of lumbar spine with instrumentation L1/L2, L5/S1, removal of hardware 5/21  EBL 500cc, received 250 cell saver    PLAN  - confused and agitated overnight, pain control prn IV tylenol,   - BP better controlled today. cont acei and toprol, HCTZ. hold eliquis and plavix until ok with ortho spine.  TTE 2023 EF 60%, moderate AS.   - on room air. encourage incentive spirometer  - PO diet.. bowel regimen prn  - pinedo in place. dc IV fuids.  monitor I & Os   - VENECIA drain mgmt as per sx. monitor output  - BGMs ac hs. ISS. hold glymepiride, janumet.  - post op abx IV ancef. monitor WBC and temps  - Hgbstable.  no chemical DVT ppx d/t bleeding risk , recent surgery. cont SCDs  PT eval     Dispo: SICU. pain control prn, will try to cut back on narcotics. monitor VENECIA output. PT. restart eliquis, plavix when ok with ortho spine    Will discuss with Dr. Phoenix ASSESSMENT  84 yo male with PMHx  CAD- stents x5, AS, HTN, Hyperlipidemia, and DM II, chronic back pain, spinal stenosis s/p spinal fusion 2015.  Patient scheduled for Exploration of Fusion, Revision of Hardware L2-5, Laminectomy L1/2, L5/S1    s/p laminectomy with osteotomy, fusion of lumbar spine with instrumentation L1/L2, L5/S1, removal of hardware 5/21  EBL 500cc, received 250 cell saver    PLAN  - confused and agitated overnight, pain control prn IV tylenol, low dose oxycodone  - BP better controlled today. cont acei and toprol, HCTZ. hold eliquis and plavix until ok with ortho spine.  TTE 2023 EF 60%, moderate AS.   - on room air. encourage incentive spirometer  - PO diet.. bowel regimen prn  - pinedo dcd yesterday. monitor I & Os   - VENECIA drain mgmt as per sx. monitor output  - BGMs ac hs. ISS. hold glymepiride, janumet. will add lantus for tonight  - leukocytosis 13k, VENECIA drain in place still will resume post op abx ancef. afebrile. will check CXR, UA  - Hgb stable.  no chemical DVT ppx d/t bleeding risk , recent surgery. cont SCDs  PT eval     Dispo: SICU. pain control prn. monitor VENECIA output. PT. restart eliquis, plavix when ok with ortho spine    Will discuss with Dr. Phoenix ASSESSMENT  82 yo male with PMHx  CAD- stents x5, AS, HTN, Hyperlipidemia, and DM II, chronic back pain, spinal stenosis s/p spinal fusion 2015.  Patient scheduled for Exploration of Fusion, Revision of Hardware L2-5, Laminectomy L1/2, L5/S1    s/p laminectomy with osteotomy, fusion of lumbar spine with instrumentation L1/L2, L5/S1, removal of hardware 5/21  EBL 500cc, received 250 cell saver    PLAN  - confused and agitated overnight, pain control prn IV tylenol, low dose oxycodone  - BP better controlled today. cont acei and toprol, HCTZ. hold eliquis and plavix until ok with ortho spine.  TTE 2023 EF 60%, moderate AS.   - on room air. encourage incentive spirometer  - PO diet.. bowel regimen prn  - pinedo dcd yesterday. monitor I & Os   - VENECIA drain mgmt as per sx. monitor output  - BGMs ac hs. ISS. hold glymepiride, janumet. will add lantus for tonight  - leukocytosis 11k, VENECIA drain in place, s/p post op abx ancef, no further ancef needed as per ortho afebrile.   - Hgb stable.  no chemical DVT ppx d/t bleeding risk , recent surgery. cont SCDs  PT eval     Dispo: Stable for 2 N. pain control prn. monitor VENECIA output. PT. restart eliquis, plavix when ok with ortho spine    Will discuss with Dr. Phoenix, d/w Dr. Putnam

## 2024-05-23 NOTE — CDI QUERY NOTE - NSCDIOTHERTXTBX_GEN_ALL_CORE_HH
This patient is noted with an episode of increased confusion, hallucinations and agitation. Can you further specify the condition to a more specific diagnosis? And is that the diagnosis that you have made to the underlying cause?     Document etiology of the signs and symptoms above status such as:     - Toxic encephalopathy   - Acute confusion/delirium  - Other (please specify)     Supporting documentation:     Provider Contact Note 5/22/2024 @ 05:49   Pt with increased confusion and hallucination, states hearing a "typewriter". On PCA dilaudid. Pt requesting PCA button to be taken away and be placed on the floor.    Provider Contact Note 5/22/2024 @ 07:55  Pt with increased confusion/agitation and paranoid. Refusing to take blood pressure medication from staff. Demanding to speak to doctor.     SICU Progress note 5/22/2024   - confused and agitated overnight, pain control prn IV tylenol, transition off dilaudid  pca, dc flexeril    SICU Progress note 5/23/2024  calmer today, more lucid. states back pain is 4/10 when laying down and 8/10 with movement,

## 2024-05-23 NOTE — PROGRESS NOTE ADULT - NS ATTEND AMEND GEN_ALL_CORE FT
84 y/o M with hx of CAD, spinal stenosis, present for laminectomy  and spinal fusion of L1/L2, L5/S1  patient acute delirium improves   continue PO pain management  yarelis drain management per surgery team   PT, incentive spirometer  pinedo removed yesterday  continue to monitor leukocytosis   okay to downgrade per surgery 84 y/o M with hx of CAD, spinal stenosis, present for laminectomy  and spinal fusion of L1/L2, L5/S1  patient acute delirium improves   continue PO pain management  yarelis drain management per surgery team   PT, incentive spirometer  pinedo removed yesterday  continue to monitor leukocytosis   glycemic control   okay to downgrade per surgery

## 2024-05-24 LAB
ANION GAP SERPL CALC-SCNC: 6 MMOL/L — SIGNIFICANT CHANGE UP (ref 5–17)
BASOPHILS # BLD AUTO: 0.07 K/UL — SIGNIFICANT CHANGE UP (ref 0–0.2)
BASOPHILS NFR BLD AUTO: 0.8 % — SIGNIFICANT CHANGE UP (ref 0–2)
BUN SERPL-MCNC: 21 MG/DL — SIGNIFICANT CHANGE UP (ref 7–23)
CALCIUM SERPL-MCNC: 8.9 MG/DL — SIGNIFICANT CHANGE UP (ref 8.5–10.1)
CHLORIDE SERPL-SCNC: 105 MMOL/L — SIGNIFICANT CHANGE UP (ref 96–108)
CO2 SERPL-SCNC: 28 MMOL/L — SIGNIFICANT CHANGE UP (ref 22–31)
CREAT SERPL-MCNC: 1.16 MG/DL — SIGNIFICANT CHANGE UP (ref 0.5–1.3)
EGFR: 63 ML/MIN/1.73M2 — SIGNIFICANT CHANGE UP
EOSINOPHIL # BLD AUTO: 0.07 K/UL — SIGNIFICANT CHANGE UP (ref 0–0.5)
EOSINOPHIL NFR BLD AUTO: 0.8 % — SIGNIFICANT CHANGE UP (ref 0–6)
GLUCOSE BLDC GLUCOMTR-MCNC: 196 MG/DL — HIGH (ref 70–99)
GLUCOSE BLDC GLUCOMTR-MCNC: 224 MG/DL — HIGH (ref 70–99)
GLUCOSE BLDC GLUCOMTR-MCNC: 306 MG/DL — HIGH (ref 70–99)
GLUCOSE BLDC GLUCOMTR-MCNC: 326 MG/DL — HIGH (ref 70–99)
GLUCOSE SERPL-MCNC: 213 MG/DL — HIGH (ref 70–99)
HCT VFR BLD CALC: 35.3 % — LOW (ref 39–50)
HGB BLD-MCNC: 11.1 G/DL — LOW (ref 13–17)
IMM GRANULOCYTES NFR BLD AUTO: 0.8 % — SIGNIFICANT CHANGE UP (ref 0–0.9)
LYMPHOCYTES # BLD AUTO: 1.22 K/UL — SIGNIFICANT CHANGE UP (ref 1–3.3)
LYMPHOCYTES # BLD AUTO: 13.5 % — SIGNIFICANT CHANGE UP (ref 13–44)
MAGNESIUM SERPL-MCNC: 1.9 MG/DL — SIGNIFICANT CHANGE UP (ref 1.6–2.6)
MCHC RBC-ENTMCNC: 26.7 PG — LOW (ref 27–34)
MCHC RBC-ENTMCNC: 31.4 GM/DL — LOW (ref 32–36)
MCV RBC AUTO: 85.1 FL — SIGNIFICANT CHANGE UP (ref 80–100)
MONOCYTES # BLD AUTO: 0.69 K/UL — SIGNIFICANT CHANGE UP (ref 0–0.9)
MONOCYTES NFR BLD AUTO: 7.6 % — SIGNIFICANT CHANGE UP (ref 2–14)
NEUTROPHILS # BLD AUTO: 6.91 K/UL — SIGNIFICANT CHANGE UP (ref 1.8–7.4)
NEUTROPHILS NFR BLD AUTO: 76.5 % — SIGNIFICANT CHANGE UP (ref 43–77)
PHOSPHATE SERPL-MCNC: 2 MG/DL — LOW (ref 2.5–4.5)
PLATELET # BLD AUTO: 220 K/UL — SIGNIFICANT CHANGE UP (ref 150–400)
POTASSIUM SERPL-MCNC: 3.7 MMOL/L — SIGNIFICANT CHANGE UP (ref 3.5–5.3)
POTASSIUM SERPL-SCNC: 3.7 MMOL/L — SIGNIFICANT CHANGE UP (ref 3.5–5.3)
RBC # BLD: 4.15 M/UL — LOW (ref 4.2–5.8)
RBC # FLD: 16.9 % — HIGH (ref 10.3–14.5)
SODIUM SERPL-SCNC: 139 MMOL/L — SIGNIFICANT CHANGE UP (ref 135–145)
WBC # BLD: 9.03 K/UL — SIGNIFICANT CHANGE UP (ref 3.8–10.5)
WBC # FLD AUTO: 9.03 K/UL — SIGNIFICANT CHANGE UP (ref 3.8–10.5)

## 2024-05-24 PROCEDURE — 99221 1ST HOSP IP/OBS SF/LOW 40: CPT

## 2024-05-24 RX ORDER — FINASTERIDE 5 MG/1
5 TABLET, FILM COATED ORAL AT BEDTIME
Refills: 0 | Status: DISCONTINUED | OUTPATIENT
Start: 2024-05-24 | End: 2024-05-25

## 2024-05-24 RX ORDER — DOXAZOSIN MESYLATE 4 MG
4 TABLET ORAL AT BEDTIME
Refills: 0 | Status: DISCONTINUED | OUTPATIENT
Start: 2024-05-24 | End: 2024-05-25

## 2024-05-24 RX ORDER — POLYETHYLENE GLYCOL 3350 17 G/17G
17 POWDER, FOR SOLUTION ORAL DAILY
Refills: 0 | Status: DISCONTINUED | OUTPATIENT
Start: 2024-05-24 | End: 2024-05-25

## 2024-05-24 RX ORDER — INSULIN GLARGINE 100 [IU]/ML
5 INJECTION, SOLUTION SUBCUTANEOUS AT BEDTIME
Refills: 0 | Status: DISCONTINUED | OUTPATIENT
Start: 2024-05-24 | End: 2024-05-25

## 2024-05-24 RX ORDER — DOXAZOSIN MESYLATE 4 MG
1 TABLET ORAL
Refills: 0 | DISCHARGE

## 2024-05-24 RX ORDER — METOPROLOL TARTRATE 50 MG
200 TABLET ORAL DAILY
Refills: 0 | Status: DISCONTINUED | OUTPATIENT
Start: 2024-05-25 | End: 2024-05-25

## 2024-05-24 RX ORDER — SODIUM,POTASSIUM PHOSPHATES 278-250MG
1 POWDER IN PACKET (EA) ORAL ONCE
Refills: 0 | Status: COMPLETED | OUTPATIENT
Start: 2024-05-24 | End: 2024-05-24

## 2024-05-24 RX ORDER — FINASTERIDE 5 MG/1
1 TABLET, FILM COATED ORAL
Refills: 0 | DISCHARGE

## 2024-05-24 RX ADMIN — SENNA PLUS 2 TABLET(S): 8.6 TABLET ORAL at 21:37

## 2024-05-24 RX ADMIN — FINASTERIDE 5 MILLIGRAM(S): 5 TABLET, FILM COATED ORAL at 11:38

## 2024-05-24 RX ADMIN — SODIUM CHLORIDE 3 MILLILITER(S): 9 INJECTION INTRAMUSCULAR; INTRAVENOUS; SUBCUTANEOUS at 14:36

## 2024-05-24 RX ADMIN — Medication 1: at 07:46

## 2024-05-24 RX ADMIN — Medication 2: at 11:25

## 2024-05-24 RX ADMIN — Medication 650 MILLIGRAM(S): at 05:22

## 2024-05-24 RX ADMIN — OXYCODONE HYDROCHLORIDE 5 MILLIGRAM(S): 5 TABLET ORAL at 22:13

## 2024-05-24 RX ADMIN — SODIUM CHLORIDE 3 MILLILITER(S): 9 INJECTION INTRAMUSCULAR; INTRAVENOUS; SUBCUTANEOUS at 21:33

## 2024-05-24 RX ADMIN — TRAMADOL HYDROCHLORIDE 25 MILLIGRAM(S): 50 TABLET ORAL at 11:32

## 2024-05-24 RX ADMIN — LISINOPRIL 40 MILLIGRAM(S): 2.5 TABLET ORAL at 09:08

## 2024-05-24 RX ADMIN — Medication 650 MILLIGRAM(S): at 12:29

## 2024-05-24 RX ADMIN — SODIUM CHLORIDE 3 MILLILITER(S): 9 INJECTION INTRAMUSCULAR; INTRAVENOUS; SUBCUTANEOUS at 05:12

## 2024-05-24 RX ADMIN — Medication 1 PACKET(S): at 11:38

## 2024-05-24 RX ADMIN — Medication 650 MILLIGRAM(S): at 11:28

## 2024-05-24 RX ADMIN — Medication 650 MILLIGRAM(S): at 00:05

## 2024-05-24 RX ADMIN — Medication 650 MILLIGRAM(S): at 17:57

## 2024-05-24 RX ADMIN — Medication 4 MILLIGRAM(S): at 21:39

## 2024-05-24 RX ADMIN — INSULIN GLARGINE 5 UNIT(S): 100 INJECTION, SOLUTION SUBCUTANEOUS at 21:40

## 2024-05-24 RX ADMIN — Medication 1 TABLET(S): at 09:13

## 2024-05-24 RX ADMIN — Medication 4: at 16:41

## 2024-05-24 RX ADMIN — Medication 2: at 21:45

## 2024-05-24 RX ADMIN — Medication 100 MILLIGRAM(S): at 09:08

## 2024-05-24 RX ADMIN — OXYCODONE HYDROCHLORIDE 5 MILLIGRAM(S): 5 TABLET ORAL at 15:43

## 2024-05-24 RX ADMIN — Medication 4 MILLIGRAM(S): at 11:29

## 2024-05-24 RX ADMIN — FINASTERIDE 5 MILLIGRAM(S): 5 TABLET, FILM COATED ORAL at 21:39

## 2024-05-24 RX ADMIN — OXYCODONE HYDROCHLORIDE 5 MILLIGRAM(S): 5 TABLET ORAL at 21:43

## 2024-05-24 RX ADMIN — OXYCODONE HYDROCHLORIDE 5 MILLIGRAM(S): 5 TABLET ORAL at 09:09

## 2024-05-24 NOTE — CONSULT NOTE ADULT - ASSESSMENT
83 year old man admitted with       *chronic back pain due to  spinal stenosis s/p laminectomy with osteotomy, fusion of lumbar spine with instrumentation L1/L2, L5/S1, removal of hardware 5/21  - management as per Ortho spine   - VENECIA intact.  - monitor output  - complete antibiotics  - Pain regimen PRN  - PT     *HTN  - resume home meds  - monitor BP    *T2DM  - on janumet and glimeperide at home- HOLD while inpatient   - ISS  - Diabetic diet  - AGRCÍA    *urinary retention related to BPH  - bladder scan PRN  - Straight cath if bladder scan >400  - resume doxazosin and finasteride        *PAF  - rate controlled  - metoporolol  - eliquis on hold, resume when cleared by Ortho spine     DVT prophylaxis- venodyne and ambulation.   Case d/w team on IDR.

## 2024-05-24 NOTE — CONSULT NOTE ADULT - NS ATTEND AMEND GEN_ALL_CORE FT
Patient seen and examined with ERIC Swanson.  I was physically present for the key portions of the evaluation and management (E/M) service provided.  I agree with the above history, physical, and plan which I have reviewed and edited where appropriate.     *chronic back pain due to  spinal stenosis s/p laminectomy with osteotomy, fusion of lumbar spine with instrumentation L1/L2, L5/S1, removal of hardware 5/21  - management as per Ortho spine and case d/w dr monk  - VENECIA intact.  - monitor output  - complete antibiotics  - Pain regimen PRN  - PT     *HTN  - resume home meds  - monitor BP    *T2DM  - on janumet and glimeperide at home- HOLD while inpatient   - ISS  - Diabetic diet  - GARCÍA    *urinary retention related to BPH  - bladder scan PRN  - Straight cath if bladder scan >400  - resume doxazosin and finasteride        *PAF  - rate controlled  - metoporolol  - eliquis on hold, resume when cleared by Ortho spine     DVT prophylaxis- venodyne and ambulation.   Case d/w team on IDR.
as above

## 2024-05-24 NOTE — CONSULT NOTE ADULT - SUBJECTIVE AND OBJECTIVE BOX
CHIEF COMPLAINT:  worsening back pain s/p laminectomy and fusion     HISTORY OF THE PRESENT ILLNESS:    83 year old man with history of AS, CAD, HLD, PAF on eliquis, spinal stenosis s/p spinal fusion in 2015, T2DM. Now s/p laminectomy with osteotomy, fusion of lumbar spine with instrumentation L1/L2, L5/S1, removal of hardware 5/21.   Hospitalist consulted for medical management.    5/24- Patient was seen and examined. VSS.  as per report- patient with urinary retention and was straight cath to >500ml of urine this morning. He stated that he's had urinary retention at home and is on 2 meds which helps with the retention.         Allergies  No Known Allergies  Intolerances      PAST MEDICAL & SURGICAL HISTORY:  Type 2 diabetes mellitus  Hypertension  Hyperlipidemia  Renal calculus  Renal cyst  CAD (coronary artery disease)  Aortic stenosis, mild 2008  BPH (benign prostatic hypertrophy)  Spinal stenosis of lumbar region s/p ZHOU x 2  Sciatica  PAF (paroxysmal atrial fibrillation)  Stented coronary artery  2/28/08 OM2 x 1; 3/13/2008 x 3 stents  S/P T&A (status post tonsillectomy and adenoidectomy)  H/O lithotripsy 2013  S/P lumbar spinal fusion  H/O bilateral cataract extraction      FAMILY HISTORY:  FH: bladder cancer (Father)    Social History:  denies smoking  drinks on occasions  denies substance or drug use         REVIEW OF SYSTEMS:   All 10 systems reviewed in detailed and found to be negative with the exception of what has already been described above    MEDICATIONS  (STANDING):  acetaminophen     Tablet .. 650 milliGRAM(s) Oral every 6 hours  acetaminophen   IVPB .. 1000 milliGRAM(s) IV Intermittent once  chlorhexidine 4% Liquid 1 Application(s) Topical <User Schedule>  dextrose 10% Bolus 125 milliLiter(s) IV Bolus once  dextrose 5%. 1000 milliLiter(s) (100 mL/Hr) IV Continuous <Continuous>  dextrose 5%. 1000 milliLiter(s) (50 mL/Hr) IV Continuous <Continuous>  dextrose 50% Injectable 25 Gram(s) IV Push once  dextrose 50% Injectable 12.5 Gram(s) IV Push once  doxazosin 4 milliGRAM(s) Oral at bedtime  finasteride 5 milliGRAM(s) Oral at bedtime  glucagon  Injectable 1 milliGRAM(s) IntraMuscular once  hydrochlorothiazide 12.5 milliGRAM(s) Oral daily  insulin glargine Injectable (LANTUS) 5 Unit(s) SubCutaneous at bedtime  insulin lispro (ADMELOG) corrective regimen sliding scale   SubCutaneous three times a day before meals  insulin lispro (ADMELOG) corrective regimen sliding scale   SubCutaneous at bedtime  lisinopril 40 milliGRAM(s) Oral daily  multivitamin 1 Tablet(s) Oral daily  potassium phosphate / sodium phosphate Powder (PHOS-NaK) 1 Packet(s) Oral once  senna 2 Tablet(s) Oral at bedtime  sodium chloride 0.9% lock flush 3 milliLiter(s) IV Push every 8 hours    MEDICATIONS  (PRN):  bisacodyl 5 milliGRAM(s) Oral every 12 hours PRN Constipation  cyclobenzaprine 5 milliGRAM(s) Oral three times a day PRN Muscle Spasm  dextrose Oral Gel 15 Gram(s) Oral once PRN Blood Glucose LESS THAN 70 milliGRAM(s)/deciliter  HYDROmorphone  Injectable 0.5 milliGRAM(s) IV Push every 6 hours PRN breakthrough pain  naloxone Injectable 0.1 milliGRAM(s) IV Push every 3 minutes PRN For ANY of the following changes in patient status:  A. RR LESS THAN 10 breaths per minute, B. Oxygen saturation LESS THAN 90%, C. Sedation score of 6  ondansetron Injectable 4 milliGRAM(s) IV Push every 6 hours PRN Nausea  oxyCODONE    IR 5 milliGRAM(s) Oral every 4 hours PRN Severe Pain (7 - 10)  traMADol 25 milliGRAM(s) Oral every 6 hours PRN Moderate Pain (4 - 6)      PE:  Constitutional: NAD, laying in bed  HEENT: NC/AT  Back: no tenderness  Respiratory: respirations even and non labored, LCTA  Cardiovascular: S1S2 regular, no murmurs  Abdomen: soft, not tender, not distended, positive BS- VENECIA intact.   Genitourinary: voiding  Musculoskeletal: no muscle tenderness, no joint swelling or tenderness  Extremities: no pedal edema   Neurological: no focal deficits     LABS:                          11.1   9.03  )-----------( 220      ( 24 May 2024 08:19 )             35.3     05-24    139  |  105  |  21  ----------------------------<  213<H>  3.7   |  28  |  1.16    Ca    8.9      24 May 2024 08:19  Phos  2.0     05-24  Mg     1.9     05-24        Urinalysis Basic - ( 24 May 2024 08:19 )    Color: x / Appearance: x / SG: x / pH: x  Gluc: 213 mg/dL / Ketone: x  / Bili: x / Urobili: x   Blood: x / Protein: x / Nitrite: x   Leuk Esterase: x / RBC: x / WBC x   Sq Epi: x / Non Sq Epi: x / Bacteria:

## 2024-05-25 VITALS
RESPIRATION RATE: 18 BRPM | DIASTOLIC BLOOD PRESSURE: 62 MMHG | SYSTOLIC BLOOD PRESSURE: 100 MMHG | OXYGEN SATURATION: 97 % | HEART RATE: 102 BPM | TEMPERATURE: 98 F

## 2024-05-25 LAB
ANION GAP SERPL CALC-SCNC: 7 MMOL/L — SIGNIFICANT CHANGE UP (ref 5–17)
BUN SERPL-MCNC: 36 MG/DL — HIGH (ref 7–23)
CALCIUM SERPL-MCNC: 8.7 MG/DL — SIGNIFICANT CHANGE UP (ref 8.5–10.1)
CHLORIDE SERPL-SCNC: 105 MMOL/L — SIGNIFICANT CHANGE UP (ref 96–108)
CO2 SERPL-SCNC: 27 MMOL/L — SIGNIFICANT CHANGE UP (ref 22–31)
CREAT SERPL-MCNC: 1.73 MG/DL — HIGH (ref 0.5–1.3)
EGFR: 39 ML/MIN/1.73M2 — LOW
GLUCOSE BLDC GLUCOMTR-MCNC: 235 MG/DL — HIGH (ref 70–99)
GLUCOSE BLDC GLUCOMTR-MCNC: 291 MG/DL — HIGH (ref 70–99)
GLUCOSE SERPL-MCNC: 241 MG/DL — HIGH (ref 70–99)
HCT VFR BLD CALC: 31 % — LOW (ref 39–50)
HGB BLD-MCNC: 9.8 G/DL — LOW (ref 13–17)
MCHC RBC-ENTMCNC: 27.1 PG — SIGNIFICANT CHANGE UP (ref 27–34)
MCHC RBC-ENTMCNC: 31.6 GM/DL — LOW (ref 32–36)
MCV RBC AUTO: 85.6 FL — SIGNIFICANT CHANGE UP (ref 80–100)
PLATELET # BLD AUTO: 213 K/UL — SIGNIFICANT CHANGE UP (ref 150–400)
POTASSIUM SERPL-MCNC: 4 MMOL/L — SIGNIFICANT CHANGE UP (ref 3.5–5.3)
POTASSIUM SERPL-SCNC: 4 MMOL/L — SIGNIFICANT CHANGE UP (ref 3.5–5.3)
RBC # BLD: 3.62 M/UL — LOW (ref 4.2–5.8)
RBC # FLD: 16.8 % — HIGH (ref 10.3–14.5)
SODIUM SERPL-SCNC: 139 MMOL/L — SIGNIFICANT CHANGE UP (ref 135–145)
WBC # BLD: 6.99 K/UL — SIGNIFICANT CHANGE UP (ref 3.8–10.5)
WBC # FLD AUTO: 6.99 K/UL — SIGNIFICANT CHANGE UP (ref 3.8–10.5)

## 2024-05-25 RX ORDER — IBUPROFEN 200 MG
1 TABLET ORAL
Refills: 0 | DISCHARGE

## 2024-05-25 RX ORDER — ACETAMINOPHEN 500 MG
2 TABLET ORAL
Qty: 0 | Refills: 0 | DISCHARGE
Start: 2024-05-25

## 2024-05-25 RX ORDER — OXYCODONE HYDROCHLORIDE 5 MG/1
1 TABLET ORAL
Qty: 0 | Refills: 0 | DISCHARGE
Start: 2024-05-25

## 2024-05-25 RX ADMIN — SODIUM CHLORIDE 3 MILLILITER(S): 9 INJECTION INTRAMUSCULAR; INTRAVENOUS; SUBCUTANEOUS at 06:24

## 2024-05-25 RX ADMIN — Medication 3: at 12:29

## 2024-05-25 RX ADMIN — Medication 650 MILLIGRAM(S): at 06:08

## 2024-05-25 RX ADMIN — TRAMADOL HYDROCHLORIDE 25 MILLIGRAM(S): 50 TABLET ORAL at 09:15

## 2024-05-25 RX ADMIN — Medication 650 MILLIGRAM(S): at 13:28

## 2024-05-25 RX ADMIN — Medication 1 TABLET(S): at 10:18

## 2024-05-25 RX ADMIN — CHLORHEXIDINE GLUCONATE 1 APPLICATION(S): 213 SOLUTION TOPICAL at 06:10

## 2024-05-25 RX ADMIN — Medication 2: at 08:17

## 2024-05-25 RX ADMIN — OXYCODONE HYDROCHLORIDE 5 MILLIGRAM(S): 5 TABLET ORAL at 03:53

## 2024-05-25 RX ADMIN — TRAMADOL HYDROCHLORIDE 25 MILLIGRAM(S): 50 TABLET ORAL at 08:17

## 2024-05-25 RX ADMIN — OXYCODONE HYDROCHLORIDE 5 MILLIGRAM(S): 5 TABLET ORAL at 10:17

## 2024-05-25 RX ADMIN — Medication 650 MILLIGRAM(S): at 14:21

## 2024-05-25 RX ADMIN — OXYCODONE HYDROCHLORIDE 5 MILLIGRAM(S): 5 TABLET ORAL at 04:23

## 2024-05-25 RX ADMIN — OXYCODONE HYDROCHLORIDE 5 MILLIGRAM(S): 5 TABLET ORAL at 11:15

## 2024-05-25 NOTE — PROGRESS NOTE ADULT - PROBLEM SELECTOR PROBLEM 1
Lumbar postlaminectomy syndrome

## 2024-05-25 NOTE — PROGRESS NOTE ADULT - SUBJECTIVE AND OBJECTIVE BOX
Patient is a 83y old  Male who presents with a chief complaint of     BRIEF HOSPITAL COURSE: 82 yo male with PMHx  CAD- stents x5, AS, HTN, Hyperlipidemia, and DM II, chronic back pain, spinal stenosis s/p spinal fusion 2015.  Patient scheduled for Exploration of Fusion, Revision of Hardware L2-5, Laminectomy L1/2, L5/S1    s/p laminectomy with osteotomy, fusion of lumbar spine with instrumentation L1/L2, L5/S1, removal of hardware 5/21  EBL 500cc, received 250 cell saver    5/21 pt seen in sicu, lethargic d/t pain meds but easily arousable. states the lower back pain 7/10, denies numbness tingling wiggling toes. sensation to light touch intact.   5/22 pt agitated overnight. confused    PAST MEDICAL & SURGICAL HISTORY:  Type 2 diabetes mellitus  Hypertension  Hyperlipidemia  Renal calculus  Renal cyst  CAD (coronary artery disease)  Aortic stenosis, mild  2008  BPH (benign prostatic hypertrophy)  Spinal stenosis of lumbar region  s/p ZHOU x 2  Sciatica  PAF (paroxysmal atrial fibrillation)  Stented coronary artery  2/28/08 OM2 x 1; 3/13/2008 x 3 stents  S/P T&A (status post tonsillectomy and adenoidectomy)  H/O lithotripsy  2013  S/P lumbar spinal fusion  H/O bilateral cataract extraction      Medications:  acetaminophen   IVPB .. 1000 milliGRAM(s) IV Intermittent once  fentaNYL    Injectable 50 MICROGram(s) IV Push every 5 minutes PRN  HYDROmorphone  Injectable 0.5 milliGRAM(s) IV Push every 10 minutes PRN  HYDROmorphone PCA (1 mG/mL) 30 milliLiter(s) PCA Continuous PCA Continuous  HYDROmorphone PCA (1 mG/mL) Rescue Clinician Bolus 0.5 milliGRAM(s) IV Push every 15 minutes PRN  ondansetron Injectable 4 milliGRAM(s) IV Push every 6 hours PRN  ondansetron Injectable 4 milliGRAM(s) IV Push every 6 hours PRN  oxyCODONE    IR 5 milliGRAM(s) Oral once PRN  naloxone Injectable 0.1 milliGRAM(s) IV Push every 3 minutes PRN      Vital Signs Last 24 Hrs  T(C): 37 (22 May 2024 10:02), Max: 37 (22 May 2024 10:02)  T(F): 98.6 (22 May 2024 10:02), Max: 98.6 (22 May 2024 10:02)  HR: 104 (22 May 2024 10:14) (62 - 127)  BP: 133/86 (22 May 2024 10:14) (126/84 - 206/110)  BP(mean): 101 (22 May 2024 10:14) (98 - 139)  RR: 32 (22 May 2024 10:14) (12 - 32)  SpO2: 99% (22 May 2024 10:14) (97% - 100%)    Parameters below as of 21 May 2024 20:00  Patient On (Oxygen Delivery Method): nasal cannula  O2 Flow (L/min): 2    I&O's Detail    21 May 2024 07:01  -  22 May 2024 07:00  --------------------------------------------------------  IN:    Lactated Ringers: 783 mL    Other (mL): 1700 mL  Total IN: 2483 mL    OUT:    Drain (mL): 440 mL    Indwelling Catheter - Urethral (mL): 1350 mL    Other (mL): 250 mL  Total OUT: 2040 mL    Total NET: 443 mL        LABS:             11.9   8.04  )-----------( 223      ( 22 May 2024 06:08 )             38.3     05-22    139  |  106  |  17  ----------------------------<  171<H>  4.0   |  28  |  1.22    Ca    8.4<L>      22 May 2024 06:08  Phos  2.7     05-22  Mg     1.7     05-22      Urinalysis Basic - ( 22 May 2024 06:08 )    Color: x / Appearance: x / SG: x / pH: x  Gluc: 171 mg/dL / Ketone: x  / Bili: x / Urobili: x   Blood: x / Protein: x / Nitrite: x   Leuk Esterase: x / RBC: x / WBC x   Sq Epi: x / Non Sq Epi: x / Bacteria: x    CAPILLARY BLOOD GLUCOSE  POCT Blood Glucose.: 217 mg/dL (21 May 2024 13:06)    CULTURES:    Physical Examination:    General: No acute distress.    PULM: Clear to auscultation bilaterally,   CVS: Regular rate and rhythm, + loud systolic murmur in RUSB  ABD: Soft, nondistended, nontender  EXT: No LE edema b/l  SKIN: Warm and well perfused  NEURO: lethagic from narcotics    DEVICES:   shahida CUADRA     RADIOLOGY:  
POD#1  comfortable  somewhat confused  afeb vss  nv intact  dressing intact  VENECIA 260  11.9/38  stable
POD#2  comfortable  afeb vss  nv intact     Rob 175 cc left in place  labs ok  oob in brace
POD#3  had to be straight cathed last evening  pt did not give us his prostate meds  doesn't know dosages    afeb vss  nv intact  jordan 70 cc  labs ok
Patient is a 83y old  Male who presents with a chief complaint of     BRIEF HOSPITAL COURSE: 84 yo male with PMHx  CAD- stents x5, AS, HTN, Hyperlipidemia, and DM II, chronic back pain, spinal stenosis s/p spinal fusion 2015.  Patient scheduled for Exploration of Fusion, Revision of Hardware L2-5, Laminectomy L1/2, L5/S1    s/p laminectomy with osteotomy, fusion of lumbar spine with instrumentation L1/L2, L5/S1, removal of hardware 5/21  EBL 500cc, received 250 cell saver    5/21 pt seen in sicu, lethargic d/t pain meds but easily arousable. states the lower back pain 7/10, denies numbness tingling wiggling toes. sensation to light touch intact.   5/22 pt agitated overnight. confused  5/23 calmer today, more lucid. states back pain is 4/10 when laying down and 8/10 with movement,     PAST MEDICAL & SURGICAL HISTORY:  Type 2 diabetes mellitus  Hypertension  Hyperlipidemia  Renal calculus  Renal cyst  CAD (coronary artery disease)  Aortic stenosis, mild  2008  BPH (benign prostatic hypertrophy)  Spinal stenosis of lumbar region  s/p ZHOU x 2  Sciatica  PAF (paroxysmal atrial fibrillation)  Stented coronary artery  2/28/08 OM2 x 1; 3/13/2008 x 3 stents  S/P T&A (status post tonsillectomy and adenoidectomy)  H/O lithotripsy  2013  S/P lumbar spinal fusion  H/O bilateral cataract extraction      MEDICATIONS  (STANDING):  acetaminophen     Tablet .. 650 milliGRAM(s) Oral every 6 hours  acetaminophen   IVPB .. 1000 milliGRAM(s) IV Intermittent once  dextrose 10% Bolus 125 milliLiter(s) IV Bolus once  dextrose 5%. 1000 milliLiter(s) (50 mL/Hr) IV Continuous <Continuous>  dextrose 5%. 1000 milliLiter(s) (100 mL/Hr) IV Continuous <Continuous>  dextrose 50% Injectable 25 Gram(s) IV Push once  dextrose 50% Injectable 12.5 Gram(s) IV Push once  glucagon  Injectable 1 milliGRAM(s) IntraMuscular once  hydrochlorothiazide 12.5 milliGRAM(s) Oral daily  insulin lispro (ADMELOG) corrective regimen sliding scale   SubCutaneous three times a day before meals  insulin lispro (ADMELOG) corrective regimen sliding scale   SubCutaneous at bedtime  lisinopril 40 milliGRAM(s) Oral daily  metoprolol succinate  milliGRAM(s) Oral daily  multivitamin 1 Tablet(s) Oral daily  senna 2 Tablet(s) Oral at bedtime    Vital Signs Last 24 Hrs  T(C): 37.2 (23 May 2024 06:00), Max: 37.2 (23 May 2024 06:00)  T(F): 99 (23 May 2024 06:00), Max: 99 (23 May 2024 06:00)  HR: 90 (23 May 2024 08:00) (78 - 110)  BP: 125/76 (23 May 2024 08:00) (97/58 - 155/87)  BP(mean): 91 (23 May 2024 08:00) (71 - 106)  RR: 19 (23 May 2024 08:00) (16 - 32)  SpO2: 100% (23 May 2024 08:00) (92% - 100%)    Parameters below as of 23 May 2024 08:00  Patient On (Oxygen Delivery Method): room air      I&O's Detail    22 May 2024 07:01  -  23 May 2024 07:00  --------------------------------------------------------  IN:  Total IN: 0 mL    OUT:    Drain (mL): 385 mL    Indwelling Catheter - Urethral (mL): 2100 mL  Total OUT: 2485 mL    Total NET: -2485 mL      LABS:                        11.4   11.38 )-----------( 238      ( 23 May 2024 05:52 )             36.2     05-23    141  |  108  |  22  ----------------------------<  230<H>  4.2   |  26  |  1.16    Ca    9.1      23 May 2024 05:52  Phos  2.7     05-22  Mg     1.7     05-22      Urinalysis Basic - ( 23 May 2024 05:52 )    Color: x / Appearance: x / SG: x / pH: x  Gluc: 230 mg/dL / Ketone: x  / Bili: x / Urobili: x   Blood: x / Protein: x / Nitrite: x   Leuk Esterase: x / RBC: x / WBC x   Sq Epi: x / Non Sq Epi: x / Bacteria: x      CULTURES:    Physical Examination:    General: No acute distress.    PULM: Clear to auscultation bilaterally,   CVS: Regular rate and rhythm, + loud systolic murmur in RUSB  ABD: Soft, nondistended, nontender  EXT: No LE edema b/l  SKIN: Warm and well perfused  NEURO: l awake and alert, more lucid    DEVICES:   VENECIA - serosanguinous    RADIOLOGY:  
POD#4  oob  comfortable  back on prostate meds and voiding well  afeb vss  wound ok  VENECIA min drainage  dc'd  nv intact

## 2024-05-25 NOTE — PROGRESS NOTE ADULT - PROBLEM SELECTOR PLAN 1
stable  ready for dc to rehab  instructions given to pt
stable  dc PCA  oob w brace
pt to have his wife call with meds and dosages  possible dc to rehab tomorrow or sunday  all questions answered
stable post op  ok to go to [facility]  hospitalist consult

## 2024-05-25 NOTE — DISCHARGE NOTE NURSING/CASE MANAGEMENT/SOCIAL WORK - PATIENT PORTAL LINK FT
You can access the FollowMyHealth Patient Portal offered by [facility] by registering at the following website: [email or web address]. By joining [facility]’s FollowMyHealth portal, you will also be able to view your health information using other applications (apps) compatible with our system.

## 2024-05-25 NOTE — DISCHARGE NOTE PROVIDER - CARE PROVIDERS DIRECT ADDRESSES
[email or web address]

## 2024-05-25 NOTE — DISCHARGE NOTE PROVIDER - CARE PROVIDER_API CALL
Harish Putnam  Orthopaedic Surgery  [address] [place] [address]  Phone: [phone]  Fax: [phone]  Established Patient  Follow Up Time:

## 2024-05-25 NOTE — DISCHARGE NOTE PROVIDER - NSDCCPCAREPLAN_GEN_ALL_CORE_FT
PRINCIPAL DISCHARGE DIAGNOSIS  Diagnosis: Spinal stenosis of lumbar region with neurogenic claudication  Assessment and Plan of Treatment:

## 2024-05-25 NOTE — DISCHARGE NOTE PROVIDER - NSDCCPTREATMENT_GEN_ALL_CORE_FT
PRINCIPAL PROCEDURE  Procedure: Laminectomy of lumbar spine with in situ fusion  Findings and Treatment:

## 2024-05-25 NOTE — DISCHARGE NOTE PROVIDER - NSDCMRMEDTOKEN_GEN_ALL_CORE_FT
acetaminophen 325 mg oral tablet: 2 tab(s) orally every 6 hours  apixaban 5 mg oral tablet: 1 tab(s) orally 2 times a day Directions per cardiology  doxazosin 4 mg oral tablet: 1 tab(s) orally once a day **patient ran out few wks prior to coming in**  finasteride 5 mg oral tablet: 1 tab(s) orally once a day **patient ran out few wks prior to coming in**  glimepiride 1 mg oral tablet: 1 tab(s) orally once a day  hydroCHLOROthiazide 12.5 mg oral tablet: 1 tab(s) orally every other day  Janumet 50 mg-1000 mg oral tablet: 1 tab(s) orally 2 times a day  metoprolol succinate 200 mg oral tablet, extended release: 1 tab(s) orally once a day (in the evening)  Multiple Vitamins oral tablet: 1 tab(s) orally once a day  oxyCODONE 5 mg oral tablet: 1 tab(s) orally every 4 hours As needed Severe Pain (7 - 10)  Plavix 75 mg oral tablet: 1 tab(s) orally once a day Directions per cardiology  Praluent Pen 75 mg/mL subcutaneous solution: 75 milligram(s) subcutaneously every 2 weeks Last dose 5/9/2024  traMADol 50 mg oral tablet: 1 tab(s) orally 4 times a day as needed for  moderate pain

## 2024-05-25 NOTE — DISCHARGE NOTE PROVIDER - HOSPITAL COURSE
admit  uneventful surgery  had episode of urinary retention which responded to restarting meds  oob walking in brace  stable  dc to rehab

## 2024-05-30 DIAGNOSIS — E78.5 HYPERLIPIDEMIA, UNSPECIFIED: ICD-10-CM

## 2024-05-30 DIAGNOSIS — M48.07 SPINAL STENOSIS, LUMBOSACRAL REGION: ICD-10-CM

## 2024-05-30 DIAGNOSIS — F05 DELIRIUM DUE TO KNOWN PHYSIOLOGICAL CONDITION: ICD-10-CM

## 2024-05-30 DIAGNOSIS — E11.9 TYPE 2 DIABETES MELLITUS WITHOUT COMPLICATIONS: ICD-10-CM

## 2024-05-30 DIAGNOSIS — M48.062 SPINAL STENOSIS, LUMBAR REGION WITH NEUROGENIC CLAUDICATION: ICD-10-CM

## 2024-05-30 DIAGNOSIS — I10 ESSENTIAL (PRIMARY) HYPERTENSION: ICD-10-CM

## 2024-05-30 DIAGNOSIS — N40.1 BENIGN PROSTATIC HYPERPLASIA WITH LOWER URINARY TRACT SYMPTOMS: ICD-10-CM

## 2024-05-30 DIAGNOSIS — Z95.5 PRESENCE OF CORONARY ANGIOPLASTY IMPLANT AND GRAFT: ICD-10-CM

## 2024-05-30 DIAGNOSIS — R33.8 OTHER RETENTION OF URINE: ICD-10-CM

## 2024-05-30 DIAGNOSIS — I25.10 ATHEROSCLEROTIC HEART DISEASE OF NATIVE CORONARY ARTERY WITHOUT ANGINA PECTORIS: ICD-10-CM

## 2024-05-30 DIAGNOSIS — I35.0 NONRHEUMATIC AORTIC (VALVE) STENOSIS: ICD-10-CM

## 2024-05-30 DIAGNOSIS — I48.0 PAROXYSMAL ATRIAL FIBRILLATION: ICD-10-CM

## 2024-05-30 DIAGNOSIS — Z98.1 ARTHRODESIS STATUS: ICD-10-CM

## 2024-05-30 DIAGNOSIS — M96.1 POSTLAMINECTOMY SYNDROME, NOT ELSEWHERE CLASSIFIED: ICD-10-CM

## 2024-06-17 NOTE — PROVIDER CONTACT NOTE (CRITICAL VALUE NOTIFICATION) - NAME OF MD/NP/PA/DO NOTIFIED:
Physician Progress Note      PATIENT:               MARLA THOMPSON  CSN #:                  [id number]  :                       1934  ADMIT DATE:       2024 10:50 PM  DISCH DATE:  RESPONDING  PROVIDER #:        ZARINA Rangel CNP          QUERY TEXT:    Patient admitted with COPD, Acute Respiratory Failure and AFIB.  If possible,   please document in progress notes and discharge summary if you are evaluating   and/or treating any of the following:?  ?  The medical record reflects the following:  Risk Factors: Aortic Valve Stenosis, AFIB  Clinical Indicators: Female, Age 89, with PMH of Aortic valve stenosis, HTN,   AFIB  Treatment: On Eliquis Daily.    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    Query created by: Erika Berger on 2024 10:07 AM      Electronically signed by:  ZARINA Rangel CNP 2024 10:10 AM           Dr Duckworth

## 2025-01-10 NOTE — ED STATDOCS - ENMT, MLM
There are no Wet Read(s) to document. Nasal mucosa clear.  Mouth with normal mucosa  Throat has no vesicles, no oropharyngeal exudates and uvula is midline.

## 2025-01-23 NOTE — ED STATDOCS - CROS ED ENMT ALL NEG
Acne Type: Comedonal Lesions
Prep Text (Optional): Prior to removal the treatment areas were prepped in the usual fashion.
Detail Level: Detailed
Consent was obtained and risks were reviewed including but not limited to scarring, infection, bleeding, scabbing, incomplete removal, and allergy to anesthesia.
Post-Care Instructions: I reviewed with the patient in detail post-care instructions. Patient is to keep the treatment areas dry overnight, and then apply bacitracin twice daily until healed. Patient may apply hydrogen peroxide soaks to remove any crusting.
Extraction Method: 11 blade and comedo extractor
Render Post-Care Instructions In Note?: no
negative...

## 2025-02-24 ENCOUNTER — TRANSCRIPTION ENCOUNTER (OUTPATIENT)
Age: 85
End: 2025-02-24

## 2025-02-24 ENCOUNTER — EMERGENCY (EMERGENCY)
Facility: HOSPITAL | Age: 85
LOS: 0 days | Discharge: ANOTHER HOSPITAL | End: 2025-02-24
Attending: EMERGENCY MEDICINE
Payer: MEDICARE

## 2025-02-24 ENCOUNTER — INPATIENT (INPATIENT)
Facility: HOSPITAL | Age: 85
LOS: 1 days | Discharge: HOME | End: 2025-02-26
Attending: HOSPITALIST | Admitting: HOSPITALIST
Payer: MEDICARE

## 2025-02-24 VITALS
HEART RATE: 76 BPM | DIASTOLIC BLOOD PRESSURE: 86 MMHG | TEMPERATURE: 98 F | RESPIRATION RATE: 19 BRPM | SYSTOLIC BLOOD PRESSURE: 161 MMHG | WEIGHT: 191.8 LBS | OXYGEN SATURATION: 100 %

## 2025-02-24 VITALS
DIASTOLIC BLOOD PRESSURE: 82 MMHG | RESPIRATION RATE: 18 BRPM | SYSTOLIC BLOOD PRESSURE: 142 MMHG | HEART RATE: 78 BPM | OXYGEN SATURATION: 98 % | TEMPERATURE: 98 F

## 2025-02-24 VITALS
OXYGEN SATURATION: 99 % | HEART RATE: 68 BPM | TEMPERATURE: 98 F | SYSTOLIC BLOOD PRESSURE: 173 MMHG | RESPIRATION RATE: 18 BRPM | DIASTOLIC BLOOD PRESSURE: 90 MMHG

## 2025-02-24 DIAGNOSIS — Z98.89 OTHER SPECIFIED POSTPROCEDURAL STATES: Chronic | ICD-10-CM

## 2025-02-24 DIAGNOSIS — Z98.1 ARTHRODESIS STATUS: Chronic | ICD-10-CM

## 2025-02-24 DIAGNOSIS — I48.0 PAROXYSMAL ATRIAL FIBRILLATION: ICD-10-CM

## 2025-02-24 DIAGNOSIS — Z79.84 LONG TERM (CURRENT) USE OF ORAL HYPOGLYCEMIC DRUGS: ICD-10-CM

## 2025-02-24 DIAGNOSIS — E11.9 TYPE 2 DIABETES MELLITUS WITHOUT COMPLICATIONS: ICD-10-CM

## 2025-02-24 DIAGNOSIS — H34.11 CENTRAL RETINAL ARTERY OCCLUSION, RIGHT EYE: ICD-10-CM

## 2025-02-24 DIAGNOSIS — Z98.41 CATARACT EXTRACTION STATUS, RIGHT EYE: Chronic | ICD-10-CM

## 2025-02-24 DIAGNOSIS — Z79.01 LONG TERM (CURRENT) USE OF ANTICOAGULANTS: ICD-10-CM

## 2025-02-24 DIAGNOSIS — I25.10 ATHEROSCLEROTIC HEART DISEASE OF NATIVE CORONARY ARTERY WITHOUT ANGINA PECTORIS: ICD-10-CM

## 2025-02-24 DIAGNOSIS — Z95.5 PRESENCE OF CORONARY ANGIOPLASTY IMPLANT AND GRAFT: Chronic | ICD-10-CM

## 2025-02-24 DIAGNOSIS — I10 ESSENTIAL (PRIMARY) HYPERTENSION: ICD-10-CM

## 2025-02-24 DIAGNOSIS — E78.5 HYPERLIPIDEMIA, UNSPECIFIED: ICD-10-CM

## 2025-02-24 DIAGNOSIS — H54.7 UNSPECIFIED VISUAL LOSS: ICD-10-CM

## 2025-02-24 LAB
ALBUMIN SERPL ELPH-MCNC: 3.4 G/DL — SIGNIFICANT CHANGE UP (ref 3.3–5)
ALP SERPL-CCNC: 93 U/L — SIGNIFICANT CHANGE UP (ref 40–120)
ALT FLD-CCNC: 14 U/L — SIGNIFICANT CHANGE UP (ref 12–78)
ANION GAP SERPL CALC-SCNC: 3 MMOL/L — LOW (ref 5–17)
APTT BLD: 29 SEC — SIGNIFICANT CHANGE UP (ref 24.5–35.6)
AST SERPL-CCNC: 10 U/L — LOW (ref 15–37)
BASOPHILS # BLD AUTO: 0.08 K/UL — SIGNIFICANT CHANGE UP (ref 0–0.2)
BASOPHILS NFR BLD AUTO: 1.4 % — SIGNIFICANT CHANGE UP (ref 0–2)
BILIRUB SERPL-MCNC: 0.2 MG/DL — SIGNIFICANT CHANGE UP (ref 0.2–1.2)
BUN SERPL-MCNC: 17 MG/DL — SIGNIFICANT CHANGE UP (ref 7–23)
CALCIUM SERPL-MCNC: 9.4 MG/DL — SIGNIFICANT CHANGE UP (ref 8.5–10.1)
CHLORIDE SERPL-SCNC: 109 MMOL/L — HIGH (ref 96–108)
CO2 SERPL-SCNC: 28 MMOL/L — SIGNIFICANT CHANGE UP (ref 22–31)
CREAT SERPL-MCNC: 1.17 MG/DL — SIGNIFICANT CHANGE UP (ref 0.5–1.3)
EGFR: 62 ML/MIN/1.73M2 — SIGNIFICANT CHANGE UP
EOSINOPHIL # BLD AUTO: 0.1 K/UL — SIGNIFICANT CHANGE UP (ref 0–0.5)
EOSINOPHIL NFR BLD AUTO: 1.8 % — SIGNIFICANT CHANGE UP (ref 0–6)
GLUCOSE SERPL-MCNC: 125 MG/DL — HIGH (ref 70–99)
HCT VFR BLD CALC: 44 % — SIGNIFICANT CHANGE UP (ref 39–50)
HGB BLD-MCNC: 14 G/DL — SIGNIFICANT CHANGE UP (ref 13–17)
IMM GRANULOCYTES # BLD AUTO: 0.03 K/UL — SIGNIFICANT CHANGE UP (ref 0–0.07)
IMM GRANULOCYTES NFR BLD AUTO: 0.5 % — SIGNIFICANT CHANGE UP (ref 0–0.9)
INR BLD: 0.86 RATIO — SIGNIFICANT CHANGE UP (ref 0.85–1.16)
LYMPHOCYTES # BLD AUTO: 1.37 K/UL — SIGNIFICANT CHANGE UP (ref 1–3.3)
LYMPHOCYTES NFR BLD AUTO: 24.7 % — SIGNIFICANT CHANGE UP (ref 13–44)
MCHC RBC-ENTMCNC: 27.9 PG — SIGNIFICANT CHANGE UP (ref 27–34)
MCHC RBC-ENTMCNC: 31.8 G/DL — LOW (ref 32–36)
MCV RBC AUTO: 87.6 FL — SIGNIFICANT CHANGE UP (ref 80–100)
MONOCYTES # BLD AUTO: 0.44 K/UL — SIGNIFICANT CHANGE UP (ref 0–0.9)
MONOCYTES NFR BLD AUTO: 7.9 % — SIGNIFICANT CHANGE UP (ref 2–14)
NEUTROPHILS # BLD AUTO: 3.53 K/UL — SIGNIFICANT CHANGE UP (ref 1.8–7.4)
NEUTROPHILS NFR BLD AUTO: 63.7 % — SIGNIFICANT CHANGE UP (ref 43–77)
NRBC # BLD AUTO: 0 K/UL — SIGNIFICANT CHANGE UP (ref 0–0)
NRBC # FLD: 0 K/UL — SIGNIFICANT CHANGE UP (ref 0–0)
NRBC BLD AUTO-RTO: 0 /100 WBCS — SIGNIFICANT CHANGE UP (ref 0–0)
PLATELET # BLD AUTO: 197 K/UL — SIGNIFICANT CHANGE UP (ref 150–400)
PMV BLD: 10.7 FL — SIGNIFICANT CHANGE UP (ref 7–13)
POTASSIUM SERPL-MCNC: 3.8 MMOL/L — SIGNIFICANT CHANGE UP (ref 3.5–5.3)
POTASSIUM SERPL-SCNC: 3.8 MMOL/L — SIGNIFICANT CHANGE UP (ref 3.5–5.3)
PROT SERPL-MCNC: 7.3 GM/DL — SIGNIFICANT CHANGE UP (ref 6–8.3)
PROTHROM AB SERPL-ACNC: 10.2 SEC — SIGNIFICANT CHANGE UP (ref 9.9–13.4)
RBC # BLD: 5.02 M/UL — SIGNIFICANT CHANGE UP (ref 4.2–5.8)
RBC # FLD: 16.1 % — HIGH (ref 10.3–14.5)
SODIUM SERPL-SCNC: 140 MMOL/L — SIGNIFICANT CHANGE UP (ref 135–145)
TROPONIN I, HIGH SENSITIVITY RESULT: 15.27 NG/L — SIGNIFICANT CHANGE UP
WBC # BLD: 5.55 K/UL — SIGNIFICANT CHANGE UP (ref 3.8–10.5)
WBC # FLD AUTO: 5.55 K/UL — SIGNIFICANT CHANGE UP (ref 3.8–10.5)

## 2025-02-24 PROCEDURE — 85025 COMPLETE CBC W/AUTO DIFF WBC: CPT

## 2025-02-24 PROCEDURE — 93005 ELECTROCARDIOGRAM TRACING: CPT

## 2025-02-24 PROCEDURE — 80061 LIPID PANEL: CPT

## 2025-02-24 PROCEDURE — 70498 CT ANGIOGRAPHY NECK: CPT | Mod: 26

## 2025-02-24 PROCEDURE — 36415 COLL VENOUS BLD VENIPUNCTURE: CPT

## 2025-02-24 PROCEDURE — 93010 ELECTROCARDIOGRAM REPORT: CPT

## 2025-02-24 PROCEDURE — 70498 CT ANGIOGRAPHY NECK: CPT

## 2025-02-24 PROCEDURE — 70496 CT ANGIOGRAPHY HEAD: CPT | Mod: 26

## 2025-02-24 PROCEDURE — 71045 X-RAY EXAM CHEST 1 VIEW: CPT

## 2025-02-24 PROCEDURE — 99285 EMERGENCY DEPT VISIT HI MDM: CPT | Mod: FS

## 2025-02-24 PROCEDURE — 85610 PROTHROMBIN TIME: CPT

## 2025-02-24 PROCEDURE — 83036 HEMOGLOBIN GLYCOSYLATED A1C: CPT

## 2025-02-24 PROCEDURE — 80053 COMPREHEN METABOLIC PANEL: CPT

## 2025-02-24 PROCEDURE — 85730 THROMBOPLASTIN TIME PARTIAL: CPT

## 2025-02-24 PROCEDURE — 71045 X-RAY EXAM CHEST 1 VIEW: CPT | Mod: 26

## 2025-02-24 PROCEDURE — 99285 EMERGENCY DEPT VISIT HI MDM: CPT | Mod: 25

## 2025-02-24 PROCEDURE — 70496 CT ANGIOGRAPHY HEAD: CPT

## 2025-02-24 PROCEDURE — 84484 ASSAY OF TROPONIN QUANT: CPT

## 2025-02-24 RX ORDER — METOPROLOL SUCCINATE 25 MG
200 TABLET, EXTENDED RELEASE 24 HR ORAL DAILY
Refills: 0 | Status: DISCONTINUED | OUTPATIENT
Start: 2025-02-24 | End: 2025-02-24

## 2025-02-24 RX ADMIN — Medication 200 MILLIGRAM(S): at 22:35

## 2025-02-24 RX ADMIN — Medication 75 MILLIGRAM(S): at 19:50

## 2025-02-24 NOTE — ED STATDOCS - OBJECTIVE STATEMENT
85 y/o male with PMHx of CAD, paroxysmal Afib on Plavix (last dose taken 2/20/25), HTN, HLD, DM Type II on Janumet presents to the ED SIB opthalmology with chief complaint of complete right vision loss 2 days ago, concern for retinal artery occlusion per optho. Patient states he was in [place] seated on a bench at onset of symptoms, gradual vision loss from top of right visual field to bottom, now with complete right visual field loss but states he was able to see the provider's silhouette at opthalmology appointment. Denies aphasia. Ambulatory with a cane at baseline.

## 2025-02-24 NOTE — ED STATDOCS - CLINICAL SUMMARY MEDICAL DECISION MAKING FREE TEXT BOX
#CRAO, right; Ischemic CVA  CTA negative for LVO, aneurysm, dissection  Not a thrombolytic candidate 2/2 time of onset > 24 hours    Plan  -Admit to telemetry, discussed with  ***  -VS per routine; cardiac monitor  -Permissive HTN, goal < 220/120 mmHg  -Restart plavix, and statin.  -  Passed   bedside swallow;   advance diet as tolerated.     -W/u to assess etiology:  -MRI. MR angio head & neck.  -2d echo, carotid US.  -HbA1c, Lipid Panel  -Neurology consult Patient was evaluated by a retina specialist prior to arrival, confirmed CRAO of the right eye.  Patient does have risk factors for stroke, including CAD, proximal small A-fib, hypertension, hyperlipidemia, diabetes type 2.  He is outside of the window for TNK.  He has no LVO or aneurysm on CT angio.  Patient was given his Plavix, states that he cannot take aspirin so this medication was held.  Lipid panel, hemoglobin A1c ordered.  EKG is normal sinus, with right bundle branch block, no arrhythmia currently.  Discussed with medicine attending, with plan to admit to telemetry for CVA workup.  Medicine attending requesting transfer to [facility] for ophthalmology evaluation. Ophthalmology consulted via transfer center, appreciate consult, will see patient [facility] for further evaluation.    #CRAO, right; Ischemic CVA  -confirmed by retina specialist at [facility] prior to arrival  -CTA negative for LVO, aneurysm, dissection  -Not a thrombolytic candidate 2/2 time of onset > 4.5 hours    Plan  -Admit to telemetry  -VS per routine; cardiac monitor  -Permissive HTN, goal < 220/120 mmHg  -Restart plavix, and statin.  -  Passed   bedside swallow;   advance diet as tolerated.     -W/u to assess etiology:  -MRI. MR angio head & neck.  -2d echo, carotid US.  -HbA1c, Lipid Panel  -Neurology consult

## 2025-02-24 NOTE — ED STATDOCS - PHYSICAL EXAMINATION
Patient awake, alert, orient x 3, no acute distress  Heart sounds within normal limits, regular rate rhythm, no murmur  Lungs are clear bilaterally  Abdomen soft, nontender, nondistended.  Extremities are well-perfused  Skin without rash Patient awake, alert, orient x 3, no acute distress  Dilated right pupil, nonresponsive to light. Full EOM.   Heart sounds within normal limits, regular rate rhythm, no murmur  Lungs are clear bilaterally  Normal strength and normal sensation, normal speech. NIHSS 2. Patient appears well, nontoxic, no acute distress  OD with dilated pupil (s/p chemical dilation with ophthalmology), round, nonresponsive to light. and only visualizes faint light on my exam, no proptosis; OS WNL, Full EOM bilaterally  Heart sounds within normal limits, regular rate rhythm, no murmur  Lungs are clear bilaterally  Neurological exam:  HF: A x O x 3. Appropriately interactive, normal affect. Speech fluent, No Aphasia, No Dysarthria, Naming /repetition intact   CN: facial sensation normal, no facial droop, tongue midline, Palate moves equally, SCM equal bilaterally  Motor: No pronator drift, Strength 5/5 in all 4 ext, normal bulk and tone, no tremor, rigidity or bradykinesia.    Sens: Intact to light touch to bilateral face, upper, and lower extremities  No dysmetria/ataxia  NIHSS 2 for right eye blindness.

## 2025-02-24 NOTE — PHARMACOTHERAPY INTERVENTION NOTE - COMMENTS
Medication reconciliation completed.  Reviewed Medication list and confirmed med allergies with patient; confirmed with Dr. First Medkenny.

## 2025-02-24 NOTE — ED ADULT NURSE NOTE - CHPI ED NUR SYMPTOMS POS
Due for Tdap.  [employer] has put a hold on his classes until he gets it done.    
Is here to reestablish care with me.  
right eye visual loss that did not return see note

## 2025-02-24 NOTE — ED ADULT TRIAGE NOTE - CHIEF COMPLAINT QUOTE
Patient transferred from [facility] for optho consult, h/o  HTN, HLD, DM2, ablation, on plavix, Patient c/o right eye curtain vison issues Patient states symptoms started on Saturday. Patient dx with right retinal occlusion, Patient denies headache, weakness, blurred vision, no facial droop or slurred speech noted. Patient transferred from [facility], for Optho consult due to right retinal occlusion, currently endorsing right eye visual  disturbances. Patient denies pain at this time.  h/o DM, HTN, HLD, ablation.

## 2025-02-24 NOTE — ED ADULT NURSE NOTE - NSFALLOOBATTEMPT_ED_ALL_ED
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  Weight management is a personal decision.  If you are interested in exploring weight loss strategies, the following discussion covers the approaches that may be successful. Body mass index (BMI) is one way to tell whether you are at a healthy weight, overweight, or obese. It measures your weight in relation to your height.  A BMI of 18.5 to 24.9 is in the healthy range. A person with a BMI of 25 to 29.9 is considered overweight, and someone with a BMI of 30 or greater is considered obese. More than two-thirds of American adults are considered overweight or obese.  Being overweight or obese increases the risk for further weight gain. Excess weight may lead to heart disease and diabetes.  Creating and following plans for healthy eating and physical activity may help you improve your health.  Weight control is part of healthy lifestyle and includes exercise, emotional health, and healthy eating habits. Careful eating habits lifelong are the mainstay of weight control. Though there are significant health benefits from weight loss, long-term weight loss with diet alone may be very difficult to achieve- studies show long-term success with dietary management in less than 10% of people. Attaining a healthy weight may be especially difficult to achieve in those with severe obesity. In some cases, medications, devices and surgical management might be considered.  What can you do?  If you are overweight or  obese and are interested in methods for weight loss, you should discuss this with your provider.     Consider reducing daily calorie intake by 500 calories.     Keep a food journal.     Avoiding skipping meals, consider cutting portions instead.    Diet combined with exercise helps maintain muscle while optimizing fat loss. Strength training is particularly important for building and maintaining muscle mass. Exercise helps reduce stress, increase energy, and improves fitness. Increasing exercise without diet control, however, may not burn enough calories to loose weight.       Start walking three days a week 10-20 minutes at a time    Work towards walking thirty minutes five days a week     Eventually, increase the speed of your walking for 1-2 minutes at time    In addition, we recommend that you review healthy lifestyles and methods for weight loss available through the National Institutes of Health patient information sites:  http://win.niddk.nih.gov/publications/index.htm    And look into health and wellness programs that may be available through your health insurance provider, employer, local community center, or martha club.    Weight management plan: Patient was referred to their PCP to discuss a diet and exercise plan.   Patient to follow up with Primary Care provider regarding elevated blood pressure.            Follow-ups after your visit        Your next 10 appointments already scheduled     Jun 27, 2018 12:00 PM CDT   (Arrive by 11:45 AM)   [facility] Routine Visit with  EEG TECH 2   [facility] ([facility])    [address] [address] [phone]            Jul 12, 2018  1:00 PM CDT   XR LUMBAR PUNCTURE SPINAL TAP DIAGNOSTIC with MGXR3, MG NEURO RAD   [facility] ([facility])    [address] [place] [address] [phone]           For nerve root injection, please send or bring copies of  any MRIs or other scans you have had.  Bring a list of your current medicines to your exam. (Include vitamins, minerals and over-the-counter medicines.) Leave your valuables at home.  Plan to have someone drive you home afterward.  Stop taking the following medicines (but talk to your doctor first):   If you take blood thinners, you may need to stop taking them a few days before treatment. Talk to your doctor before stopping these medicines.Stop taking Coumadin (warfarin) 3 days before treatment. Restart the day after treatment.   If you take Plavix, Ticlid, Pletal or Persantine, please ask your doctor if you should stop these medicines. You may need extra tests on the morning of your scan.   If you take Xarelto (Rivaroxaban), you may need to stop taking it 24 hours before treatment. Talk to your doctor before stopping this medicine. Restart the day after treatment.  You may take your other medicines as normal.  Stop all food and drink (including water) 3 hours before your test or treatment.  Please tell the doctor:   If you are allergic to X-ray dye (contrast fluid).   If you may be pregnant.  Injections take about 30 to 45 minutes. Most people spend up to 2 hours in the clinic or hospital.  Please call the Imaging Department at your exam site with any questions.            Jul 22, 2018  8:00 PM CDT   PSG Split with BK BED 1   [facility] ([facility])    [address] [address]   [phone]            Aug 01, 2018  2:30 PM CDT   Lab with [facility] ([facility])    [address]  1st Floor  [facility] [address] [phone]            Aug 01, 2018  3:30 PM CDT   (Arrive by 3:15 PM)   New General Liver with Dario Serrano MD   [facility] ([facility])    [address]  [address]  [facility] [id number] [phone]            Aug 06, 2018 11:45  AM CDT   MR BRAIN W/O & W CONTRAST with MGMR1   [facility] ([facility])    [address] [address] [place] [address] [phone]           Take your medicines as usual, unless your doctor tells you not to. Bring a list of your current medicines to your exam (including vitamins, minerals and over-the-counter drugs).  You may or may not receive intravenous (IV) contrast for this exam pending the discretion of the Radiologist.  You do not need to do anything special to prepare.  The MRI machine uses a strong magnet. Please wear clothes without metal (snaps, zippers). A sweatsuit works well, or we may give you a hospital gown.  Please remove any body piercings and hair extensions before you arrive. You will also remove watches, jewelry, hairpins, wallets, dentures, partial dental plates and hearing aids. You may wear contact lenses, and you may be able to wear your rings. We have a safe place to keep your personal items, but it is safer to leave them at home.  **IMPORTANT** THE INSTRUCTIONS BELOW ARE ONLY FOR THOSE PATIENTS WHO HAVE BEEN PRESCRIBED SEDATION OR GENERAL ANESTHESIA DURING THEIR MRI PROCEDURE:  IF YOUR DOCTOR PRESCRIBED ORAL SEDATION (take medicine to help you relax during your exam):   You must get the medicine from your doctor (oral medication) before you arrive. Bring the medicine to the exam. Do not take it at home. You ll be told when to take it upon arriving for your exam.   Arrive one hour early. Bring someone who can take you home after the test. Your medicine will make you sleepy. After the exam, you may not drive, take a bus or take a taxi by yourself.  IF YOUR DOCTOR PRESCRIBED IV SEDATION:   Arrive one hour early. Bring someone who can take you home after the test. Your medicine will make you sleepy. After the exam, you may not drive, take a bus or take a taxi by yourself.   No eating 6 hours before your exam. You may have clear liquids up until 4  hours before your exam. (Clear liquids include water, clear tea, black coffee and fruit juice without pulp.)  IF YOUR DOCTOR PRESCRIBED ANESTHESIA (be asleep for your exam):   Arrive 1 1/2 hours early. Bring someone who can take you home after the test. You may not drive, take a bus or take a taxi by yourself.   No eating 8 hours before your exam. You may have clear liquids up until 4 hours before your exam. (Clear liquids include water, clear tea, black coffee and fruit juice without pulp.)   You will spend four to five hours in the recovery room.  Please call the Imaging Department at your exam site with any questions.            Aug 06, 2018  1:00 PM CDT   Return Visit with Nagi Lr MD   [facility] ([facility])    [address] [place] [address]   [phone]            Aug 16, 2018 11:00 AM CDT   Return Sleep Patient with Audi Chacko MD   [facility] ([facility])    [address] [place] [id number] [phone]              Future tests that were ordered for you today     Open Future Orders        Priority Expected Expires Ordered    Comprehensive Sleep Study Routine  12/22/2018 6/25/2018            Who to contact     If you have questions or need follow up information about today's clinic visit or your schedule please contact [facility] directly at [phone].  Normal or non-critical lab and imaging results will be communicated to you by MyChart, letter or phone within 4 business days after the clinic has received the results. If you do not hear from us within 7 days, please contact the clinic through MyChart or phone. If you have a critical or abnormal lab result, we will notify you by phone as soon as possible.  Submit refill requests through "[employer] or call your pharmacy and they will forward the refill request to us. Please allow 3 business days for  "your refill to be completed.          Additional Information About Your Visit        [employer] Information     [employer] lets you send messages to your doctor, view your test results, renew your prescriptions, schedule appointments and more. To sign up, go to [email or web address] . Click on \"Log in\" on the left side of the screen, which will take you to the Welcome page. Then click on \"Sign up Now\" on the right side of the page.     You will be asked to enter the access code listed below, as well as some personal information. Please follow the directions to create your username and password.     Your access code is: [email or web address]  Expires: 2018  4:17 PM     Your access code will  in 90 days. If you need help or a new code, please call your [facility] clinic or [phone].        Care EveryWhere ID     This is your [facility] EveryWhere ID. This could be used by other organizations to access your [facility] medical records  [id number]        Your Vitals Were     Pulse Respirations Height Pulse Oximetry BMI (Body Mass Index)       116 16 1.676 m (5' 6\") 99% 34.22 kg/m2        Blood Pressure from Last 3 Encounters:   18 151/83   18 (!) 151/94   18 (!) 155/98    Weight from Last 3 Encounters:   18 96.2 kg (212 lb)   18 97.3 kg (214 lb 9.6 oz)   18 97.3 kg (214 lb 6.4 oz)              We Performed the Following     SLEEP EVALUATION & MANAGEMENT REFERRAL - ADULT -[facility]  [phone] (Age 2 and up)        Primary Care Provider Office Phone # Fax #    Lluvia Feng [phone]       [address] [address]        Equal Access to Services     LUIS [place]KE[place] : Andreina Goldstein, waisabelle luqadaha, qaybta kaalkathy thomas. [facility] [phone].    ATENCIÓN: Si habla español, tiene a masters disposición servicios gratuitos de " asistencia lingüística. Amarjit al [phone].    We comply with applicable federal civil rights laws and [place] laws. We do not discriminate on the basis of race, color, national origin, age, disability, sex, sexual orientation, or gender identity.            Thank you!     Thank you for choosing [facility]  for your care. Our goal is always to provide you with excellent care. Hearing back from our patients is one way we can continue to improve our services. Please take a few minutes to complete the written survey that you may receive in the mail after your visit with us. Thank you!             Your Updated Medication List - Protect others around you: Learn how to safely use, store and throw away your medicines at www.disposemymeds.org.          This list is accurate as of 6/25/18 10:55 AM.  Always use your most recent med list.                   Brand Name Dispense Instructions for use Diagnosis    ACETAMINOPHEN PO      Take 1,000 mg by mouth        ALEVE PO      Take 550 mg by mouth        colchicine 0.6 MG tablet    COLCYRS     0.6 mg by Oral or J tube route        cyclobenzaprine 10 MG tablet    FLEXERIL     10 mg by Oral or J tube route        esomeprazole 40 MG CR capsule    nexIUM     40 mg by Oral or J tube route        meclizine 25 MG tablet    ANTIVERT     25 mg by Oral or J tube route        * oxyCODONE IR 10 MG tablet    ROXICODONE     10 mg by Oral or J tube route        * oxyCODONE 20 MG 12 hr tablet    OxyCONTIN     20 mg by Oral or J tube route        predniSONE 10 MG tablet    DELTASONE     40mg x5 days, 30mg x5 days, 20mg x5 days, 10mg x5 days        PROBIOTIC ACIDOPHILUS PO           promethazine 25 MG tablet    PHENERGAN     25 mg by Oral or J tube route        * Notice:  This list has 2 medication(s) that are the same as other medications prescribed for you. Read the directions carefully, and ask your doctor or other care provider to review them with you.       No

## 2025-02-24 NOTE — ED ADULT NURSE NOTE - OBJECTIVE STATEMENT
Saturday patient started to have visual loss in the right eye like a curtain coming over the eye.  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    hx bilateral cataracts.

## 2025-02-24 NOTE — ED ADULT TRIAGE NOTE - CHIEF COMPLAINT QUOTE
Pt to the ED sent by Opthalmology c/o right eye complete vision loss and pain since Saturday when in [place]. Pt denies any trauma or injury. BEFAST-. Denies any other complaints.

## 2025-02-24 NOTE — ED ADULT NURSE NOTE - CHIEF COMPLAINT QUOTE
Pt to the ED sent by Opthalmology c/o right eye complete vision loss and pain since Saturday when in [place]. Pt denies any trauma or injury. BEFAST-. Denies any other complaints.

## 2025-02-24 NOTE — ED STATDOCS - PROGRESS NOTE DETAILS
Jason Montero: Patient is not a code stroke candidate given symptoms beyond 24 hours. 84-year-old male with past medical history of coronary artery disease, A-fib on Plavix, hypertension hyperlipidemia non-insulin-dependent diabetes presents to the ED complaining of painless vision loss in right eye 2 days ago.  Patient reports he was in [place] on vacation when he suddenly felt like a curtain was closing in his right eye and he could no longer see from R eye.  Patient went to see his eye doctor today and was referred to the ED with concern for retinal artery occlusion after testing.  No other neuro deficits on exam in intake room.  Patient will have labs, EKG, head CT and be admitted for stroke workup.  NIH stroke scale is 2.  Amalia Sunshine PA-C

## 2025-02-25 ENCOUNTER — RESULT REVIEW (OUTPATIENT)
Age: 85
End: 2025-02-25

## 2025-02-25 DIAGNOSIS — I25.10 ATHEROSCLEROTIC HEART DISEASE OF NATIVE CORONARY ARTERY WITHOUT ANGINA PECTORIS: ICD-10-CM

## 2025-02-25 DIAGNOSIS — E78.5 HYPERLIPIDEMIA, UNSPECIFIED: ICD-10-CM

## 2025-02-25 DIAGNOSIS — I10 ESSENTIAL (PRIMARY) HYPERTENSION: ICD-10-CM

## 2025-02-25 DIAGNOSIS — E11.9 TYPE 2 DIABETES MELLITUS WITHOUT COMPLICATIONS: ICD-10-CM

## 2025-02-25 DIAGNOSIS — I49.3 VENTRICULAR PREMATURE DEPOLARIZATION: ICD-10-CM

## 2025-02-25 DIAGNOSIS — H34.11 CENTRAL RETINAL ARTERY OCCLUSION, RIGHT EYE: ICD-10-CM

## 2025-02-25 DIAGNOSIS — Z29.9 ENCOUNTER FOR PROPHYLACTIC MEASURES, UNSPECIFIED: ICD-10-CM

## 2025-02-25 DIAGNOSIS — H34.10 CENTRAL RETINAL ARTERY OCCLUSION, UNSPECIFIED EYE: ICD-10-CM

## 2025-02-25 PROBLEM — I48.0 PAROXYSMAL ATRIAL FIBRILLATION: Chronic | Status: ACTIVE | Noted: 2024-05-09

## 2025-02-25 PROBLEM — Z00.00 ENCOUNTER FOR PREVENTIVE HEALTH EXAMINATION: Status: ACTIVE | Noted: 2025-02-25

## 2025-02-25 PROBLEM — M54.30 SCIATICA, UNSPECIFIED SIDE: Chronic | Status: ACTIVE | Noted: 2024-05-09

## 2025-02-25 LAB
A1C WITH ESTIMATED AVERAGE GLUCOSE RESULT: 6.2 % — HIGH (ref 4–5.6)
A1C WITH ESTIMATED AVERAGE GLUCOSE RESULT: 6.3 % — HIGH (ref 4–5.6)
ADD ON TEST-SPECIMEN IN LAB: SIGNIFICANT CHANGE UP
ALBUMIN SERPL ELPH-MCNC: 3.5 G/DL — SIGNIFICANT CHANGE UP (ref 3.3–5)
ALP SERPL-CCNC: 96 U/L — SIGNIFICANT CHANGE UP (ref 40–120)
ALT FLD-CCNC: 9 U/L — SIGNIFICANT CHANGE UP (ref 4–41)
ANION GAP SERPL CALC-SCNC: 11 MMOL/L — SIGNIFICANT CHANGE UP (ref 7–14)
AST SERPL-CCNC: 12 U/L — SIGNIFICANT CHANGE UP (ref 4–40)
BILIRUB SERPL-MCNC: 0.4 MG/DL — SIGNIFICANT CHANGE UP (ref 0.2–1.2)
BUN SERPL-MCNC: 15 MG/DL — SIGNIFICANT CHANGE UP (ref 7–23)
CALCIUM SERPL-MCNC: 8.7 MG/DL — SIGNIFICANT CHANGE UP (ref 8.4–10.5)
CHLORIDE SERPL-SCNC: 105 MMOL/L — SIGNIFICANT CHANGE UP (ref 98–107)
CHOLEST SERPL-MCNC: 152 MG/DL — SIGNIFICANT CHANGE UP
CHOLEST SERPL-MCNC: 168 MG/DL — SIGNIFICANT CHANGE UP
CO2 SERPL-SCNC: 22 MMOL/L — SIGNIFICANT CHANGE UP (ref 22–31)
CREAT SERPL-MCNC: 0.93 MG/DL — SIGNIFICANT CHANGE UP (ref 0.5–1.3)
CRP SERPL-MCNC: 9.5 MG/L — HIGH
EGFR: 81 ML/MIN/1.73M2 — SIGNIFICANT CHANGE UP
ERYTHROCYTE [SEDIMENTATION RATE] IN BLOOD: 7 MM/HR — SIGNIFICANT CHANGE UP (ref 1–15)
ESTIMATED AVERAGE GLUCOSE: 131 — SIGNIFICANT CHANGE UP
ESTIMATED AVERAGE GLUCOSE: 134 MG/DL — HIGH (ref 68–114)
GLUCOSE BLDC GLUCOMTR-MCNC: 103 MG/DL — HIGH (ref 70–99)
GLUCOSE BLDC GLUCOMTR-MCNC: 109 MG/DL — HIGH (ref 70–99)
GLUCOSE BLDC GLUCOMTR-MCNC: 120 MG/DL — HIGH (ref 70–99)
GLUCOSE BLDC GLUCOMTR-MCNC: 149 MG/DL — HIGH (ref 70–99)
GLUCOSE BLDC GLUCOMTR-MCNC: 75 MG/DL — SIGNIFICANT CHANGE UP (ref 70–99)
GLUCOSE BLDC GLUCOMTR-MCNC: 92 MG/DL — SIGNIFICANT CHANGE UP (ref 70–99)
GLUCOSE BLDC GLUCOMTR-MCNC: 98 MG/DL — SIGNIFICANT CHANGE UP (ref 70–99)
GLUCOSE SERPL-MCNC: 99 MG/DL — SIGNIFICANT CHANGE UP (ref 70–99)
HCT VFR BLD CALC: 42.6 % — SIGNIFICANT CHANGE UP (ref 39–50)
HDLC SERPL-MCNC: 42 MG/DL — SIGNIFICANT CHANGE UP
HDLC SERPL-MCNC: 45 MG/DL — SIGNIFICANT CHANGE UP
HGB BLD-MCNC: 13.8 G/DL — SIGNIFICANT CHANGE UP (ref 13–17)
LIPID PNL WITH DIRECT LDL SERPL: 85 MG/DL — SIGNIFICANT CHANGE UP
LIPID PNL WITH DIRECT LDL SERPL: 93 MG/DL — SIGNIFICANT CHANGE UP
MAGNESIUM SERPL-MCNC: 1.6 MG/DL — SIGNIFICANT CHANGE UP (ref 1.6–2.6)
MCHC RBC-ENTMCNC: 27.9 PG — SIGNIFICANT CHANGE UP (ref 27–34)
MCHC RBC-ENTMCNC: 32.4 G/DL — SIGNIFICANT CHANGE UP (ref 32–36)
MCV RBC AUTO: 86.1 FL — SIGNIFICANT CHANGE UP (ref 80–100)
NON HDL CHOLESTEROL: 110 MG/DL — SIGNIFICANT CHANGE UP
NON HDL CHOLESTEROL: 123 MG/DL — SIGNIFICANT CHANGE UP
NRBC # BLD AUTO: 0 K/UL — SIGNIFICANT CHANGE UP (ref 0–0)
NRBC # FLD: 0 K/UL — SIGNIFICANT CHANGE UP (ref 0–0)
NRBC BLD AUTO-RTO: 0 /100 WBCS — SIGNIFICANT CHANGE UP (ref 0–0)
PLATELET # BLD AUTO: 89 K/UL — LOW (ref 150–400)
POTASSIUM SERPL-MCNC: 4.2 MMOL/L — SIGNIFICANT CHANGE UP (ref 3.5–5.3)
POTASSIUM SERPL-SCNC: 4.2 MMOL/L — SIGNIFICANT CHANGE UP (ref 3.5–5.3)
PROT SERPL-MCNC: 6.3 G/DL — SIGNIFICANT CHANGE UP (ref 6–8.3)
RBC # BLD: 4.95 M/UL — SIGNIFICANT CHANGE UP (ref 4.2–5.8)
RBC # FLD: 15.9 % — HIGH (ref 10.3–14.5)
SODIUM SERPL-SCNC: 138 MMOL/L — SIGNIFICANT CHANGE UP (ref 135–145)
TRIGL SERPL-MCNC: 146 MG/DL — SIGNIFICANT CHANGE UP
TRIGL SERPL-MCNC: 173 MG/DL — HIGH
WBC # BLD: 5.34 K/UL — SIGNIFICANT CHANGE UP (ref 3.8–10.5)
WBC # FLD AUTO: 5.34 K/UL — SIGNIFICANT CHANGE UP (ref 3.8–10.5)

## 2025-02-25 PROCEDURE — 99222 1ST HOSP IP/OBS MODERATE 55: CPT

## 2025-02-25 PROCEDURE — 93291 INTERROG DEV EVAL SCRMS IP: CPT | Mod: 26

## 2025-02-25 PROCEDURE — 93306 TTE W/DOPPLER COMPLETE: CPT | Mod: 26

## 2025-02-25 PROCEDURE — 99223 1ST HOSP IP/OBS HIGH 75: CPT

## 2025-02-25 RX ORDER — GLUCAGON 3 MG/1
1 POWDER NASAL ONCE
Refills: 0 | Status: DISCONTINUED | OUTPATIENT
Start: 2025-02-25 | End: 2025-02-26

## 2025-02-25 RX ORDER — DOXAZOSIN MESYLATE 8 MG/1
2 TABLET ORAL AT BEDTIME
Refills: 0 | Status: DISCONTINUED | OUTPATIENT
Start: 2025-02-25 | End: 2025-02-26

## 2025-02-25 RX ORDER — SODIUM CHLORIDE 9 G/1000ML
1000 INJECTION, SOLUTION INTRAVENOUS
Refills: 0 | Status: DISCONTINUED | OUTPATIENT
Start: 2025-02-25 | End: 2025-02-26

## 2025-02-25 RX ORDER — ASPIRIN 325 MG
81 TABLET ORAL DAILY
Refills: 0 | Status: DISCONTINUED | OUTPATIENT
Start: 2025-02-25 | End: 2025-02-26

## 2025-02-25 RX ORDER — TRAMADOL HYDROCHLORIDE 50 MG/1
50 TABLET, FILM COATED ORAL ONCE
Refills: 0 | Status: DISCONTINUED | OUTPATIENT
Start: 2025-02-25 | End: 2025-02-25

## 2025-02-25 RX ORDER — TRAMADOL HYDROCHLORIDE 50 MG/1
50 TABLET, FILM COATED ORAL
Refills: 0 | Status: DISCONTINUED | OUTPATIENT
Start: 2025-02-25 | End: 2025-02-26

## 2025-02-25 RX ORDER — INSULIN LISPRO 100 U/ML
INJECTION, SOLUTION INTRAVENOUS; SUBCUTANEOUS AT BEDTIME
Refills: 0 | Status: DISCONTINUED | OUTPATIENT
Start: 2025-02-25 | End: 2025-02-26

## 2025-02-25 RX ORDER — LISINOPRIL 5 MG/1
40 TABLET ORAL DAILY
Refills: 0 | Status: DISCONTINUED | OUTPATIENT
Start: 2025-02-25 | End: 2025-02-26

## 2025-02-25 RX ORDER — METOPROLOL SUCCINATE 50 MG/1
200 TABLET, EXTENDED RELEASE ORAL DAILY
Refills: 0 | Status: DISCONTINUED | OUTPATIENT
Start: 2025-02-25 | End: 2025-02-26

## 2025-02-25 RX ORDER — DEXTROSE 50 % IN WATER 50 %
12.5 SYRINGE (ML) INTRAVENOUS ONCE
Refills: 0 | Status: DISCONTINUED | OUTPATIENT
Start: 2025-02-25 | End: 2025-02-26

## 2025-02-25 RX ORDER — DEXTROSE 50 % IN WATER 50 %
25 SYRINGE (ML) INTRAVENOUS ONCE
Refills: 0 | Status: DISCONTINUED | OUTPATIENT
Start: 2025-02-25 | End: 2025-02-26

## 2025-02-25 RX ORDER — PANTOPRAZOLE 20 MG/1
1 TABLET, DELAYED RELEASE ORAL
Refills: 0 | DISCHARGE

## 2025-02-25 RX ORDER — DOXAZOSIN MESYLATE 4 MG
1 TABLET ORAL
Refills: 0 | DISCHARGE

## 2025-02-25 RX ORDER — RAMIPRIL 2.5 MG/1
1 CAPSULE ORAL
Refills: 0 | DISCHARGE

## 2025-02-25 RX ORDER — INSULIN LISPRO 100 U/ML
INJECTION, SOLUTION INTRAVENOUS; SUBCUTANEOUS
Refills: 0 | Status: DISCONTINUED | OUTPATIENT
Start: 2025-02-25 | End: 2025-02-26

## 2025-02-25 RX ORDER — APIXABAN 2.5 MG/1
5 TABLET, FILM COATED ORAL
Refills: 0 | Status: DISCONTINUED | OUTPATIENT
Start: 2025-02-25 | End: 2025-02-26

## 2025-02-25 RX ORDER — DEXTROSE 50 % IN WATER 50 %
15 SYRINGE (ML) INTRAVENOUS ONCE
Refills: 0 | Status: DISCONTINUED | OUTPATIENT
Start: 2025-02-25 | End: 2025-02-26

## 2025-02-25 RX ORDER — TRAMADOL HYDROCHLORIDE 100 MG/1
1 TABLET, EXTENDED RELEASE ORAL
Refills: 0 | DISCHARGE

## 2025-02-25 RX ADMIN — Medication 1 APPLICATION(S): at 13:06

## 2025-02-25 RX ADMIN — Medication 81 MILLIGRAM(S): at 13:05

## 2025-02-25 RX ADMIN — APIXABAN 5 MILLIGRAM(S): 2.5 TABLET, FILM COATED ORAL at 18:08

## 2025-02-25 RX ADMIN — DOXAZOSIN MESYLATE 2 MILLIGRAM(S): 8 TABLET ORAL at 21:43

## 2025-02-25 RX ADMIN — METOPROLOL SUCCINATE 200 MILLIGRAM(S): 50 TABLET, EXTENDED RELEASE ORAL at 18:08

## 2025-02-25 RX ADMIN — LISINOPRIL 40 MILLIGRAM(S): 5 TABLET ORAL at 13:05

## 2025-02-25 RX ADMIN — TRAMADOL HYDROCHLORIDE 50 MILLIGRAM(S): 50 TABLET, FILM COATED ORAL at 02:31

## 2025-02-25 RX ADMIN — TRAMADOL HYDROCHLORIDE 50 MILLIGRAM(S): 50 TABLET, FILM COATED ORAL at 16:35

## 2025-02-25 NOTE — ED ADULT NURSE NOTE - CHIEF COMPLAINT QUOTE
Patient transferred from [facility], for Optho consult due to right retinal occlusion, currently endorsing right eye visual  disturbances. Patient denies pain at this time.  h/o DM, HTN, HLD, ablation.

## 2025-02-25 NOTE — ED ADULT NURSE REASSESSMENT NOTE - NS ED NURSE REASSESS COMMENT FT1
Patient BS obtained prior to sending patient to unit, results was 75, patient not symptomatic, provided po orange juice, RN Jesse made aware,

## 2025-02-25 NOTE — PHYSICAL THERAPY INITIAL EVALUATION ADULT - PERTINENT HX OF CURRENT PROBLEM, REHAB EVAL
Patient is a 84 year old male, PMH stated below, presents as a transfer from [facility] for visual disturbances , right eye vision loss.

## 2025-02-25 NOTE — H&P ADULT - PROBLEM SELECTOR PLAN 1
Pt with CRAO, confirmed by ophthalmology  - appreciate neuro and ophtho recs  - start eliquis 5mg BID given afib history  - check TTE and MRI head without contrast (can be done outpatient)  - ILR interrogation- EP consulted  - d/c plavix and start ASA  - pt on praluent so will defer statin for now  - q4h neuro checks  - continue tele monitoring

## 2025-02-25 NOTE — H&P ADULT - NSHPREVIEWOFSYSTEMS_GEN_ALL_CORE
GEN: No fever, chills, night sweats, weight loss  ENT: No ear pain, congestion, sore throat  RESP: No cough or trouble breathing  CARDIOVASCULAR: No chest pain or palpitations  GI: No nausea/vomiting, diarrhea, constipation  :  No change in urine output; no dysuria, hematuria, or discharge  MSK: No joint or muscle pain  SKIN: No rashes  NEURO: No headache; no abnormal movements; no numbness or tingling  Remainder negative, except as per the HPI

## 2025-02-25 NOTE — ED ADULT NURSE NOTE - NSFALLOOBATTEMPT_ED_ALL_ED
OUTPATIENT PROGRESS NOTE  TRANSITIONAL CARE MANAGEMENT - HOSPITAL DISCHARGE FOLLOW-UP      CHIEF COMPLAINT  Transitional Care Management (Hospital Discharge Follow-Up) and Hip (Bilateral hip pain since discharge from hospital.  No known injury.)      Ms. Reyes is unaccompanied today.    SUBJECTIVE   The patient was discharged from the hospital on 1/21/18. The Discharge Summary was reviewed. It documents that the patient was hospitalized for diverticulits. Reports she is having difficulty sleeping because of lower back pain that started the day after the hospital. Patient states still has some abdominal twinges on left lower side as well as the right side near the gallbladder area (1/10), most noticeable when lying down. Pain is no- radiating. Follow-up with GI is scheduled for 1/31/18 at 2pm. Still taking Cipro and Flagyl, has only used Norco once (second night home) when back pain increased and experiencing difficult time sleeping. States able to perform ADLs with minor modification and extra time. Tolerates a soft food diet without issues. BM's are soft, no pain, occasional scant blood that patient states is most likely related to her hemorrhoids.     Pertinent un-finalized hospital performed diagnostic tests - were reviewed.    Pertinent un-finalized hospital lab tests - Reviewed negative culture findings for ova & parasites, samonella, shigella, aeromonas, plesiomonas & campylobacter.    Advanced Directives:  · No Advance Directive on file.  Patient is not interested.    Durable Medical Equipment/Assistive devices prescribed: None     MEDICATIONS  The discharge medication list was reviewed. Outpatient medications were updated today. She is fully compliant with the medication regimen prescribed at the time of discharge.    HISTORIES  I have personally reviewed and updated the following electronic medical record sections: Allergies, Problem List, Past Medical History, Past Surgical History, Social History and  Family History.      Review of systems  Constitutional: Patient denies any fatigue, weight loss, weight gain, fevers.  HEENT:  Denies sinus problems, frequent colds, hearing loss, earache, eye pain or vision problems.  Respiratory:  Patient denies coughing, wheezing, hemoptysis. Positive for shortness of breath at times.  Cardiovascular:  Denies chest pain, palpitations, or history of murmur.  Gastrointestinal:  Patient denies indigestion, change in bowel habits, dark stool, blood in stool. Positive loss of appetite.  Genitourinary: No frequent urination, no blood in urine, no UTIs (urinary tract infections), no nocturia.  Extremities: No lower extremity edema, varicose veins, history of blood clots.  Musculoskeletal: No painful or swollen achy joints. Positive back pain.  Skin: No rashes, hives, acne, new moles.  Neurologic: No significant headaches, no convulsions, seizures, weakness, stroke, numbness, problems with concentration.  Psychological: Positive problems sleeping and depression.  Denies suicidal thoughts, anxiety.  Hematology: No history of anemia, bruising easily, no history of blood transfusions.  All review of systems have been completed by the patient themselves prior to our visit on an intake sheet and reviewed.      PHYSICAL EXAM  Visit Vitals  /82 (BP Location: [facility], Patient Position: Sitting, Cuff Size: Regular)   Pulse 87   Temp 98.5 °F (36.9 °C) (Tympanic)   Ht 5' 3\" (1.6 m)   Wt 106.6 kg   LMP 07/02/2015   SpO2 94%   BMI 41.63 kg/m²     General appearance: alert, appears stated age, cooperative and no distress  Head: Normocephalic, without obvious abnormality, atraumatic, sinuses nontender to percussion  Eyes: conjunctivae/sclerae normal. No erythema, edema or exudate.  Ears: normal tympanic membranes and external ear canals both ears  Nose: Nares normal. Septum midline. Mucosa normal. No drainage or sinus tenderness.  Throat: lips, mucosa, and tongue normal; teeth and gums normal  Neck:  no adenopathy, no carotid bruit, no JVD, supple, symmetrical, trachea midline and thyroid not enlarged, symmetric, no tenderness/mass/nodules  Back: Back symmetric, no curvature. Range of motion normal. No costovertebral angle tenderness., positive findings: bilateral sacral piriformis tenderness with hip external rotation and flexion.  Lungs: clear to auscultation bilaterally  Heart: regular rate and rhythm, S1, S2 normal, no murmur, click, rub or gallop  Abdomen: Soft, non-tender; bowel sounds normal; no masses, no organomegaly, tenderness in LLQ.  Extremities: extremities normal, atraumatic, no cyanosis or edema    ASSESSMENT  1. Diverticulitis of intestine without perforation or abscess without bleeding, unspecified part of intestinal tract    2. Acute bilateral low back pain without sciatica      PLAN  (K57.92) Diverticulitis of intestine without perforation or abscess without bleeding, unspecified part of intestinal tract  (primary encounter diagnosis)  Comment:  Plan:  Patient Education:  Complete all antibiotics as prescribed.  Continue all medications as prescribed  Follow-up:  GI scheduled for next week. Colonoscopy scheduled for March (per patient)    (M54.5) Acute bilateral low back pain without sciatica  Comment:  Plan:  Patient Education:  Low back exercise sheet given. If no improvement, may consider physical therapy.    Patient adherence to her treatment plan was assessed. She is   fully compliant with the entire discharge treatment plan. Patient was seen for a detailed history, detailed examination, medical decision making of moderate complexity .    Patient was seen in conjunction with Kelsy Crow, [employer] student.  Documentation was reviewed and pertinent portion of the examination and history was repeated. Agree with above note.  Jim Raza MD       No

## 2025-02-25 NOTE — OCCUPATIONAL THERAPY INITIAL EVALUATION ADULT - PERTINENT HX OF CURRENT PROBLEM, REHAB EVAL
As per H&P: "Pt is an 84 R Handed Male pmhx paroxysmal AFib (s/p ablation and ILR placement 12/13/2024, currently on Plavix every other day, see below for further history), CAD with multiple stents (most recent one 3-4 years ago), b/l cataracts, HTN, HLD, DM, L2-L5 spinal fusion in 2015 and then had some sort of repeat surgery in 10/2024, who presents to ED with R CRAO, for which neurology is consulted. Exam significant for R eye vision loss, as well as very subtle L sided weakness that is chronic since 10/2024. CTA H/N with mild bilateral cervical and intracranial ICA atherosclerosis, but no flow limiting stenosis or LVO. Will recommend admission for further workup and management"   MRI Ordered  Admit dx: Code stroke

## 2025-02-25 NOTE — ED ADULT NURSE NOTE - NSFALLUNIVINTERV_ED_ALL_ED
Bed/Stretcher in lowest position, wheels locked, appropriate side rails in place/Call bell, personal items and telephone in reach/Instruct patient to call for assistance before getting out of bed/chair/stretcher/Non-slip footwear applied when patient is off stretcher/[place] to call system/Physically safe environment - no spills, clutter or unnecessary equipment/Purposeful proactive rounding/Room/bathroom lighting operational, light cord in reach

## 2025-02-25 NOTE — PROGRESS NOTE ADULT - ASSESSMENT
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     # CRAO OD   - stopped plavix for 5 day period while away in [place]. Onset of symptoms and vision loss this past Saturday. Out of window for TPA   - [facility]am with [facility] with APD   - Posterior segment significant for CRAO OD   - Recommend complete stroke workup as per neurology   - optimization of medical comorbidities as per medicine   - Patient to fu with Dr. Tru Manning in 1 week     NOTE INCOMPLETE     Outpatient follow-up: Patient should follow-up with his/her ophthalmologist or with [facility] Department of Ophthalmology upon discharge at the address below     [facility] Department of Ophthalmology  [address] [place] [address]. 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     # CRAO OD   - stopped plavix for 5 day period while away in [place]. Onset of symptoms and vision loss this past Saturday. Out of window for TPA   - Exam with [facility] with APD   - Posterior segment significant for CRAO OD   - Recommend complete stroke workup as per neurology   - optimization of medical comorbidities as per medicine   - Patient to fu with Dr. Tru Manning in 1 week     NOTE INCOMPLETE     Seen and discussed with Dr. Rodriguez, ophthalmology attending.     Outpatient follow-up: Patient should follow-up with his/her ophthalmologist or with [facility] Department of Ophthalmology upon discharge at the address below     [facility] Department of Ophthalmology  [address] [place] [address]. 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     # CRAO OD   - stopped plavix for 5 day period while away in [place]. Onset of symptoms and vision loss this past Saturday. Out of window for TPA   - Exam with [facility] with [facility]D   - Posterior segment significant for CRAO OD   - Recommend complete stroke workup as per neurology   - optimization of medical comorbidities as per medicine   - Patient to f/u with his retina specialist Dr. Tru Manning in 1 week       Seen and discussed with Dr. Rodriguez, ophthalmology attending.     Outpatient follow-up: Patient should follow-up with his/her ophthalmologist or with [facility] Department of Ophthalmology upon discharge at the address below     If pt unable to follow up with his own retina specialist, he can follow up below:     [facility]  [facility] Department of Ophthalmology  [address]  [place], [place] [id number] [phone]

## 2025-02-25 NOTE — OCCUPATIONAL THERAPY INITIAL EVALUATION ADULT - VISUAL ACUITY
Pt with right eye vision losee- pt unable to identify objects at close distances with left vision occluded

## 2025-02-25 NOTE — CONSULT NOTE ADULT - TIME BILLING
84-year-old right-handed gentleman evaluated at [facility] on 2/25/2025 with right monocular blindness.  History and exam as above.  ROS otherwise negative.  CT head (2/25/2025) to my eye showed moderate or moderate-severe periventricular chronic ischemic changes and was otherwise unremarkable.  CTA neck (2/25/2025) to my eye showed mild calcified plaque at both carotid bifurcations.  CTA head (2/25/2025) to my eye showed mild calcified plaque involving both carotid siphons.    Impression.  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  Suspect that the mechanism is cardioembolism related to atrial fibrillation.  He does have mild extracranial and intracranial ICA plaque, but suspect that this is asymptomatic.  Doubt giant cell arteritis.  Suggest.  If not already done, check ESR/CRP; resume Eliquis; given that his coronary stents are several years old, I suspect that there is no role for concomitant treatment with an antiplatelet agent, but I defer to cardiology on this point; no role for MRI or any other neurovascular workup; from neurovascular standpoint, cleared for discharge.

## 2025-02-25 NOTE — PHYSICAL THERAPY INITIAL EVALUATION ADULT - LEVEL OF CONSCIOUSNESS, REHAB EVAL
Medication Management [address] [place] [address]  [phone] (phone)  [phone] (fax)      Anticoagulation encounter completed via telephone. Patient had lab result completed at outside lab. Mr. Victoria Cabral is a [de-identified] y.o.  male with history of PE/DVT. Patient admitted 5/20-5/26 for for right hip fracture and underwent right hip hemiarthroplasty. Patient received IV vitamin K 10 mg on 5/21. Track board updated. Patient verifies current dosing regimen and tablet strength. No missed or extra doses. Patient denies s/s bleeding/swelling/SOB/chest pain - usual bruising   No blood in urine or stool. No dietary changes. No changes in OTC agents/Herbals. Furosemide and isosribide were stopped at hospital discharge. Patient given oxycodone-acetaminophen 1 tab q4h as needed for pain - has not needed to use very often. No change in alcohol use or tobacco use. Less active with hip. Patient denies headaches/dizziness/lightheadedness. - fell a little over a week ago - did not hit head per patient - was admitted for right hip fracture/hemiarthroplasty   No vomiting/diarrhea or acute illness. No Procedures scheduled in the future at this time. Assessment:   Lab Results   Component Value Date    INR 2.57 (H) 05/28/2020    INR 2.19 (H) 05/26/2020    INR 1.52 (H) 05/25/2020    PROTIME 1.89 (H) 09/03/2011    PROTIME 2.20 (H) 09/02/2011     INR therapeutic   Recent Labs     05/28/20  1159   INR 2.57*     Patient's INR therapeutic. Patient received vitamin K 10 mg IV one week ago. Plan:  Continue Coumadin 1.25 mg W and 2.5 mg [id number]. Recheck INR in 1 week on 6/4/20. Patient reminded to call the Anticoagulation Clinic with any signs or symptoms of bleeding or with any medication changes. Patient given instructions utilizing the teach back method.     Verbal Consent for Consult Agreement participation during [phone] for [facility] Consent:  Discussed with
alert

## 2025-02-25 NOTE — OCCUPATIONAL THERAPY INITIAL EVALUATION ADULT - GENERAL OBSERVATIONS, REHAB EVAL
Pt found seated edge of bed in NAD +tele all lines intact. Pt OK to be seen for OT per DENISE Gleason. HR 77 BPM

## 2025-02-25 NOTE — H&P ADULT - PROBLEM SELECTOR PLAN 7
DVT ppx: low risk  Diet: Consistent Card  Dispo: pending PT eval    plan d/w pt cardiologist over phone

## 2025-02-25 NOTE — H&P ADULT - NSHPPHYSICALEXAM_GEN_ALL_CORE
T(C): 36.7 (02-25-25 @ 09:00), Max: 36.9 (02-24-25 @ 17:15)  HR: 66 (02-25-25 @ 09:00) (63 - 78)  BP: 147/84 (02-25-25 @ 09:00) (138/59 - 185/88)  RR: 16 (02-25-25 @ 09:00) (16 - 19)  SpO2: 98% (02-25-25 @ 09:00) (98% - 100%)    CONSTITUTIONAL: Well groomed, no apparent distress  EYES: PERRLA and symmetric, EOMI, No conjunctival or scleral injection, non-icteric  ENMT: Oral mucosa with moist membranes. Normal dentition; no pharyngeal injection or exudates  NECK: Supple, symmetric and without tracheal deviation   RESP: No respiratory distress, no use of accessory muscles; CTA b/l, no WRR  CV: RRR, +S1S2, no MRG; no JVD; no peripheral edema  GI: Soft, NT, ND, no rebound, no guarding; no palpable masses; no hepatosplenomegaly; no hernia palpated  MSK: No digital clubbing or cyanosis; examination of the (head/neck/spine/ribs/pelvis, RUE, LUE, RLE, LLE) without misalignment,            Normal ROM without pain, no spinal tenderness, normal muscle strength/tone  SKIN: No rashes or ulcers noted; no subcutaneous nodules or induration palpable  NEURO: CN II-XII intact; no focal motor deficits, sensation intact in upper and lower extremities b/l to light touch   PSYCH: Appropriate insight/judgment; A+O x 3, mood and affect appropriate, recent/remote memory intact

## 2025-02-25 NOTE — ED ADULT NURSE NOTE - OBJECTIVE STATEMENT
Patient coming in as transfer from [facility], patient received to room 19a, A&Ox4, english speaking, ambulatory, coming into ed for visual disturbances, patient states had vision loss in right eye, it was found that he had a CRAO, patient placed on cardiac monitor, NSR, on room air, breathing even and unlabored. clear lung sounds B/L, abdomen soft non-tender, non-distended. patient denies chest pain, SOB, abdominal pain, fever chills, N/V, diarrhea. Has IV 20R AC from [place], medicated as ordered.

## 2025-02-25 NOTE — H&P ADULT - ASSESSMENT
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

## 2025-02-25 NOTE — CONSULT NOTE ADULT - ASSESSMENT
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     CRAO OD   - stopped plavix for 5 day period while away in [place]. Onset of symptoms and vision loss this past Saturday. Out of window for TPA   - Exam with [facility] with APD   - Posterior segment significant for CRAO OD   - Recommend complete stroke workup as per neurology   - optimization of medical comorbidities as per medicine   - Patient to fu with Dr. Tru Manning in 1 week     Outpatient follow-up: Patient should follow-up with his/her ophthalmologist or with [facility] Department of Ophthalmology upon discharge at the address below     [facility] Department of Ophthalmology  [address] [place] [address]. [address]  [place] [address]  [phone]

## 2025-02-25 NOTE — H&P ADULT - HISTORY OF PRESENT ILLNESS
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 84M with HTN, HLD, T2DM, CAD s/p stents (last 3-4 years ago), Atrial Fibrillation and PVCs s/p ablation December 2024 s/p ILR, chronic back pain s/p lumbar fusion with revision who presents with 3 days of right eye vision loss. Pt states that he was on vacation in [place] and he suddenly developed right sided vision loss as if a shade came over his eye. Pt reports being able to motion and flashes of light but no other vision in the right eye. Denies headache, nausea, vomiting, arm/leg weakness and numbness, fevers, chills, chest pain, SOB. Pt initially presented to [facility] and then was transferred to [facility] for ophtho evaluation.    Of note pt had ablation for PVCs in December 2024. At that time he was told to anahi/c eliquis given no finding of afib on ILR interrogation. Additionally, pt was switched from ASA to plavix every other day because EGD noted gastrtitis. At that time pt was also taking ibuprofen for back pain which he notes he has since discontinued.    In ED VSS.

## 2025-02-25 NOTE — PHYSICAL THERAPY INITIAL EVALUATION ADULT - ADDITIONAL COMMENTS
Attending outpatient PT prior to admission.     Pt left seated at edge of bed in NAD, all lines intact, call bell in reach, SPO2 100%, and RN Rosibel aware.

## 2025-02-25 NOTE — PATIENT PROFILE ADULT - FALL HARM RISK - HARM RISK INTERVENTIONS

## 2025-02-25 NOTE — H&P ADULT - TIME BILLING
I have spent 95 minutes of time on the encounter which excludes teaching and separately reported services.

## 2025-02-25 NOTE — H&P ADULT - PROBLEM SELECTOR PLAN 2
# Paroxysmal afib  s/p ablation December 2024  - ILR interrogation as above  - continue metoprolol  - start eliquis 5mg BID    Discussed case with pts cardiologist Dr Yates who is in agreement with plan for eliquis and ASA

## 2025-02-25 NOTE — OCCUPATIONAL THERAPY INITIAL EVALUATION ADULT - DIAGNOSIS, OT EVAL
Pt with impaired vision and balance impacting ability to complete ADLs, IADLs, functional mobility/transfers.

## 2025-02-25 NOTE — CONSULT NOTE ADULT - SUBJECTIVE AND OBJECTIVE BOX
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       PMH: dm, CAD, HTN   POcHx: denies surg/laser  FH: denies glc/amd  Social History: denies etoh/tobacco  Ophthalmic Medications: none  Allergies: NKDA    Review of Systems:  Constitutional: No fever, chills  Eyes: loss of vision OD   Neuro: No tremors  Cardiovascular: No chest pain, palpitations  Respiratory: No SOB, no cough  GI: No nausea, vomiting, abdominal pain  : No dysuria  Skin: no rash  Psych: no depression  Endocrine: no polyuria, polydipsia  Heme/lymph: no swelling    VITALS: T(C): 36.6 (02-24-25 @ 23:25)  T(F): 97.9 (02-24-25 @ 23:25), Max: 98.4 (02-24-25 @ 17:15)  HR: 78 (02-24-25 @ 23:25) (63 - 78)  BP: 142/82 (02-24-25 @ 23:25) (142/82 - 173/90)  RR:  (18 - 19)  SpO2:  (98% - 100%)  Wt(kg): --  General: AAO x 3, appropriate mood and affect    Ophthalmology Exam:  Visual acuity (sc): HM OD, 20/30 OS   Pupils: APD OD   Ttono: 16 OU  Extraocular movements (EOMs): Full OU, no pain, no diplopia  Confrontational Visual Field (CVF): EVER OD   Color Plates: EVER OD     Pen Light Exam (PLE)  External: Flat OU  Lids/Lashes/Lacrimal Ducts: Flat OU    Sclera/Conjunctiva: W+Q OU  Cornea: Cl OU  Anterior Chamber: D+F OU    Iris: Flat OU  Lens: PCIOL OU    Fundus Exam: dilated with 1% tropicamide and 2.5% phenylephrine  Approval obtained from primary team for dilation  Patient aware that pupils can remained dilated for at least 4-6 hours  Exam performed with 20D lens    Vitreous: wnl OU  Disc, cup/disc: sharp and pink, 0.4 OU  Macula: retinal whitening OD, wnl OS   Vessels: wnl OU  Periphery: whitening OD, wnl OS     Labs/Imaging:  ***

## 2025-02-25 NOTE — PHYSICAL THERAPY INITIAL EVALUATION ADULT - ACTIVE RANGE OF MOTION EXAMINATION, REHAB EVAL
toney. upper extremity Active ROM was WNL (within normal limits)/bilateral lower extremity Active ROM was WNL (within normal limits)

## 2025-02-25 NOTE — ED PROVIDER NOTE - OBJECTIVE STATEMENT
85 y/o M with h/o HTN, hyperlipidemia, DM, AF s/p ablation, chronic back pain p/w R eye vision loss.  Pt reports sxs started suddenly on Saturday (3 days ago), no pain.  He was out of town at the time, when he returned today he saw his outpatient ophthalmalogist who diagnosed him with CRAO.  Pt subsequently referred to hospital for stroke workup.  He was seen at [place] ED - initial labs and CT imaging neg, was to be admitted for stroke work-up, but hospitalist requested transfer to [facility] for ophtho evaluation.  Pt reports mild improvement in R lateral peripheral vision.  No HA, neck pain or stiffness, weakness, numbness, tingling, cp, sob, palpitations.

## 2025-02-25 NOTE — ED PROVIDER NOTE - CRANIAL NERVE AND PUPILLARY EXAM
CENTRAL VISION NOT INTACT/PERIPHERAL VISION NOT INTACT/extra-ocular movements intact/tongue is midline

## 2025-02-25 NOTE — CONSULT NOTE ADULT - ASSESSMENT
FARHAT 15:00 on 2/22/2025  NIHSS 0  preMRS 3 (uses cane)  Pt not IV tenecteplase candidate because outside of time window  Pt not thrombectomy candidate because outside of time window, and no LVO    CTH: No acute intracranial pathology  CTA H/N: Mild stenosis of bilateral cervical and intracranial ICAs. 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 Exam significant for R eye vision loss, as well as very subtle L sided weakness that is chronic since 10/2024. CTA H/N with mild bilateral cervical and intracranial ICA atherosclerosis, but no flow limiting stenosis or LVO.      LKW 15:00 on 2/22/2025  NIHSS 0  preMRS 3 (uses cane)  Pt not IV tenecteplase candidate because outside of time window  Pt not thrombectomy candidate because outside of time window, and no LVO    CTH: No acute intracranial pathology  CTA H/N: Mild stenosis of bilateral cervical and intracranial ICAs. 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 Exam significant for R eye vision loss, as well as very subtle L sided weakness that is chronic since 10/2024. CTA H/N with mild bilateral cervical and intracranial ICA atherosclerosis, but no flow limiting stenosis or LVO. Will recommend admission for further workup and management    LKW 15:00 on 2/22/2025  NIHSS 0  preMRS 3 (uses cane)  Pt not IV tenecteplase candidate because outside of time window  Pt not thrombectomy candidate because outside of time window, and no LVO    CTH: No acute intracranial pathology  CTA H/N: Mild stenosis of bilateral cervical and intracranial ICAs. 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 (may have recurred after Ablation)     Recommendations:  [ ] Continue Plavix 75mg (if ok from GI standpoint, would recommend increasing to qd, at least while the patient is not on anticoagulation)  [ ] Would recommend transitioning back to Eliquis 5mg BID (given CRAO, this would be secondary prevention)  [ ] Atorvastatin 40-80 mg daily titrated per LDL < 70  [ ] HgbA1C, fasting lipid panel, CBC, CMP, coag panel, troponin  [ ] MRI brain w/o con to evaluate for infarction  [ ] TTE (presence of of active clot in KASH would potentially affect the urgency/importance with which anticoagulation is resumed)  [ ] telemetry to check for arrhythmia, EKG, pt already has active ILR on his person.    - Glucose control   - Stroke education and counseling  - Neuro-checks and VS q4h  - Permissive HTN up to 220/120 for 24-48h from symptom onset  - Dysphagia screen. If fails, speech/swallow eval  - aspiration, fall precautions  - STAT CT head non-contrast for change in neuro exam.   - PT/ OT / DVT ppx per primary team     Discussed with stroke fellow Dr. Nico Castaneda overnight. Case to be seen by neuro attending on AM rounds. Recommendations not finalized until after attending attestation. Pt is an 84 R Handed Male pmhx paroxysmal AFib (s/p ablation and ILR placement 12/13/2024, currently on Plavix every other day, see below for further history), CAD with multiple stents (most recent one 3-4 years ago), b/l cataracts, HTN, HLD, DM, L2-L5 spinal fusion in 2015 and then had some sort of repeat surgery in 10/2024, who presents to ED with R CRAO, for which neurology is consulted. Exam significant for R eye vision loss, as well as very subtle L sided weakness that is chronic since 10/2024. CTA H/N with mild bilateral cervical and intracranial ICA atherosclerosis, but no flow limiting stenosis or LVO. Will recommend admission for further workup and management    LKW 15:00 on 2/22/2025  NIHSS 0  preMRS 3 (uses cane)  Pt not IV tenecteplase candidate because outside of time window  Pt not thrombectomy candidate because outside of time window, and no LVO    CTH: No acute intracranial pathology  CTA H/N: Mild stenosis of bilateral cervical and intracranial ICAs. 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 (may have recurred after Ablation)     Recommendations:  [ ] Continue Plavix 75mg (if ok from GI standpoint, would recommend increasing to qd, at least while the patient is not on anticoagulation)  [ ] Would recommend transitioning back to Eliquis 5mg BID (given CRAO, this would be secondary prevention)  [ ] Atorvastatin 40-80 mg daily titrated per LDL < 70  [ ] HgbA1C, fasting lipid panel, CBC, CMP, coag panel, troponin  [ ] MRI brain w/o con to evaluate for infarction  [ ] TTE (presence of of active clot in KASH would potentially affect the urgency/importance with which anticoagulation is resumed)  [ ] telemetry to check for arrhythmia, EKG, pt already has active ILR on his person.  [ ] Would appreciate input from cardiology/EP to confirm if pt has gone back into AFib at some point since his ablation.   [ ] If concerns about GI bleed related to putting patient back on concomitant anticoagulation + antiplatelet, would recommend GI input regarding risk stratification  [ ] Appreciate ophtho recs  [ ] Rest of workup per primary team/respective consultants    - Glucose control   - Stroke education and counseling  - Neuro-checks and VS q4h  - BP Goal normotension  - Dysphagia screen. If fails, speech/swallow eval  - aspiration, fall precautions  - STAT CT head non-contrast for change in neuro exam.   - PT/ OT / DVT ppx per primary team     Discussed with stroke fellow Dr. Nico Castaneda overnight. Case to be seen by neuro attending on AM rounds. Recommendations not finalized until after attending attestation. Pt is an 84 R Handed Male pmhx paroxysmal AFib (s/p ablation and ILR placement 12/13/2024, currently on Plavix every other day, see below for further history), CAD with multiple stents (most recent one 3-4 years ago), b/l cataracts, HTN, HLD, DM, L2-L5 spinal fusion in 2015 and then had some sort of repeat surgery in 10/2024, who presents to ED with R CRAO, for which neurology is consulted. Exam significant for R eye vision loss, as well as very subtle L sided weakness that is chronic since 10/2024. CTA H/N with mild bilateral cervical and intracranial ICA atherosclerosis, but no flow limiting stenosis or LVO. Will recommend admission for further workup and management    LKW 15:00 on 2/22/2025  NIHSS 0  preMRS 3 (uses cane)  Pt not IV tenecteplase candidate because outside of time window  Pt not thrombectomy candidate because outside of time window, and no LVO    CTH: No acute intracranial pathology  CTA H/N: Mild stenosis of bilateral cervical and intracranial ICAs. 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 Given patient's stents, would defer cardiac indications for antiplatelets to primary team/cardiology.  [ ] Atorvastatin 40-80 mg daily titrated per LDL < 70  [ ] HgbA1C, fasting lipid panel, CBC, CMP, coag panel, troponin  [ ] MRI brain w/o con to evaluate for infarction  [ ] TTE (presence of of active clot in KASH would potentially affect the urgency/importance with which anticoagulation is resumed)  [ ] telemetry to check for arrhythmia, EKG, pt already has active ILR on his person.  [ ] Would appreciate input from cardiology/EP to confirm if pt has gone back into AFib at some point since his ablation.   [ ] If concerns about GI bleed related to putting patient back on concomitant anticoagulation + antiplatelet, would recommend GI input regarding risk stratification  [ ] Appreciate ophtho recs  [ ] Rest of workup per primary team/respective consultants    - Glucose control   - Stroke education and counseling  - Neuro-checks and VS q4h  - BP Goal normotension  - Dysphagia screen. If fails, speech/swallow eval  - aspiration, fall precautions  - STAT CT head non-contrast for change in neuro exam.   - PT/ OT / DVT ppx per primary team     Discussed with stroke fellow Dr. Nico Castaneda overnight. Case to be seen by neuro attending on AM rounds. Recommendations not finalized until after attending attestation. Pt is an 84 R Handed Male pmhx paroxysmal AFib (s/p ablation and ILR placement 12/13/2024, currently on Plavix every other day, see below for further history), CAD with multiple stents (most recent one 3-4 years ago), b/l cataracts, HTN, HLD, DM, L2-L5 spinal fusion in 2015 and then had some sort of repeat surgery in 10/2024, who presents to ED with R CRAO, for which neurology is consulted. Exam significant for R eye vision loss, as well as very subtle L sided weakness that is chronic since 10/2024. CTA H/N with mild bilateral cervical and intracranial ICA atherosclerosis, but no flow limiting stenosis or LVO. Will recommend admission for further workup and management    LKW 15:00 on 2/22/2025  NIHSS 0  preMRS 3 (uses cane)  Pt not IV tenecteplase candidate because outside of time window  Pt not thrombectomy candidate because outside of time window, and no LVO    CTH: No acute intracranial pathology  CTA H/N: Mild stenosis of bilateral cervical and intracranial ICAs. 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 Given patient's stents, would defer cardiac indications for antiplatelets to primary team/cardiology.  [ ] Atorvastatin 40-80 mg daily titrated per LDL < 70  [ ] HgbA1C, fasting lipid panel, CBC, CMP, coag panel, troponin  [ ] MRI brain w/o con to evaluate for infarction  [ ] TTE (presence of of active clot in KASH would potentially affect the urgency/importance with which anticoagulation is resumed)  [ ] telemetry to check for arrhythmia, EKG, pt already has active ILR on his person.  [ ] Would appreciate input from cardiology/EP to confirm if pt has gone back into AFib at some point since his ablation.   [ ] If concerns about GI bleed related to putting patient back on concomitant anticoagulation + antiplatelet, would recommend GI input regarding risk stratification  [ ] Appreciate ophtho recs  [ ] Rest of workup per primary team/respective consultants    - Glucose control   - Stroke education and counseling  - Neuro-checks and VS q4h  - BP Goal normotension  - Dysphagia screen. If fails, speech/swallow eval  - aspiration, fall precautions  - STAT CT head non-contrast for change in neuro exam.   - PT/ OT / DVT ppx per primary team     Discussed with stroke fellow Dr. Nico Castaneda overnight. Case to be seen by neuro attending on AM rounds. Recommendations not finalized until after attending attestation.

## 2025-02-25 NOTE — PROGRESS NOTE ADULT - SUBJECTIVE AND OBJECTIVE BOX
[facility] DEPARTMENT OF OPHTHALMOLOGY  ------------------------------------------------------------------------------    Interval History: No acute events overnight. Reports slight improvement in right eye peripheral vision, seeing more shadows.     MEDICATIONS  (STANDING):  apixaban 5 milliGRAM(s) Oral two times a day  aspirin  chewable 81 milliGRAM(s) Oral daily  dextrose 5%. 1000 milliLiter(s) (100 mL/Hr) IV Continuous <Continuous>  dextrose 5%. 1000 milliLiter(s) (50 mL/Hr) IV Continuous <Continuous>  dextrose 50% Injectable 25 Gram(s) IV Push once  dextrose 50% Injectable 12.5 Gram(s) IV Push once  dextrose 50% Injectable 25 Gram(s) IV Push once  doxazosin 2 milliGRAM(s) Oral at bedtime  glucagon  Injectable 1 milliGRAM(s) IntraMuscular once  hydrochlorothiazide 12.5 milliGRAM(s) Oral <User Schedule>  insulin lispro (ADMELOG) corrective regimen sliding scale   SubCutaneous three times a day before meals  insulin lispro (ADMELOG) corrective regimen sliding scale   SubCutaneous at bedtime  lisinopril 40 milliGRAM(s) Oral daily  metoprolol succinate  milliGRAM(s) Oral daily  pantoprazole    Tablet 40 milliGRAM(s) Oral before breakfast    MEDICATIONS  (PRN):  dextrose Oral Gel 15 Gram(s) Oral once PRN Blood Glucose LESS THAN 70 milliGRAM(s)/deciliter  traMADol 50 milliGRAM(s) Oral two times a day PRN moderate and/or severe pain      VITALS: T(C): 36.7 (02-25-25 @ 09:00)  T(F): 98 (02-25-25 @ 09:00), Max: 98.4 (02-24-25 @ 17:15)  HR: 66 (02-25-25 @ 09:00) (63 - 78)  BP: 147/84 (02-25-25 @ 09:00) (138/59 - 185/88)  RR:  (16 - 19)  SpO2:  (98% - 100%)  Wt(kg): --  General: AAO x 3, appropriate mood and affect      Ophthalmology Exam:  Visual acuity (sc): HM OD, 20/30 OS   Pupils: APD OD   Ttono: 16 OU  Extraocular movements (EOMs): Full OU, no pain, no diplopia  Confrontational Visual Field (CVF): EVER OD   Color Plates: EVER OD     Pen Light Exam (PLE)  External: Flat OU  Lids/Lashes/Lacrimal Ducts: Flat OU    Sclera/Conjunctiva: W+Q OU  Cornea: Cl OU  Anterior Chamber: D+F OU    Iris: Flat OU  Lens: PCIOL OU    Fundus Exam: dilated with 1% tropicamide and 2.5% phenylephrine  Approval obtained from primary team for dilation  Patient aware that pupils can remained dilated for at least 4-6 hours  Exam performed with 20D lens    Vitreous: wnl OU  Disc, cup/disc: sharp and pink, 0.4 OU  Macula: retinal whitening OD, wnl OS   Vessels: wnl OU  Periphery: whitening OD, wnl OS

## 2025-02-25 NOTE — H&P ADULT - NSHPLABSRESULTS_GEN_ALL_CORE
13.8   5.34  )-----------( 89       ( 25 Feb 2025 09:02 )             42.6     02-25    138  |  105  |  15  ----------------------------<  99  4.2   |  22  |  0.93    Ca    8.7      25 Feb 2025 09:02  Mg     1.60     02-25    TPro  6.3  /  Alb  3.5  /  TBili  0.4  /  DBili  x   /  AST  12  /  ALT  9   /  AlkPhos  96  02-25    PT/INR - ( 24 Feb 2025 17:58 )   PT: 10.2 sec;   INR: 0.86 ratio         PTT - ( 24 Feb 2025 17:58 )  PTT:29.0 sec        CAPILLARY BLOOD GLUCOSE      POCT Blood Glucose.: 103 mg/dL (25 Feb 2025 08:45)  POCT Blood Glucose.: 109 mg/dL (25 Feb 2025 03:22)  POCT Blood Glucose.: 75 mg/dL (25 Feb 2025 02:49)      Urinalysis Basic - ( 25 Feb 2025 09:02 )    Color: x / Appearance: x / SG: x / pH: x  Gluc: 99 mg/dL / Ketone: x  / Bili: x / Urobili: x   Blood: x / Protein: x / Nitrite: x   Leuk Esterase: x / RBC: x / WBC x   Sq Epi: x / Non Sq Epi: x / Bacteria: x    CT-H: Personally reviewed, no acute findings  EKG: NSR, RBBB, QTc 414

## 2025-02-25 NOTE — ED PROVIDER NOTE - CLINICAL SUMMARY MEDICAL DECISION MAKING FREE TEXT BOX
83 y/o M with h/o HTN, hyperlipidemia, DM, AF s/p ablation (Dec 2024), chronic back pain p/w CRAO.  Sxs >24 hrs, not a TNK candidate.  Transferred for ophtho eval.  Will need admission for stroke work-up, neuro consulted.

## 2025-02-25 NOTE — CONSULT NOTE ADULT - SUBJECTIVE AND OBJECTIVE BOX
Neurology - Consult Note    -  Spectra: [phone] ([facility]), [phone] ([facility])  -    HPI: 84 R Handed Male pmhx paroxysmal AFib (s/p ablation and ILR placement 12/13/2024, currently on Plavix every other day, see below for further history), CAD with multiple stents (most recent one 3-4 years ago), b/l cataracts, HTN, HLD, DM, presents to ED with R CRAO, for which neurology is consulted.    LKW 15:00 on 2/22/2025. Pt was visiting [place]    Allergies:  No Known Allergies      PMHx/PSHx/Family Hx: As above, otherwise see below   Type 2 diabetes mellitus    Hypertension    Hyperlipidemia    Renal calculus    Renal cyst    CAD (coronary artery disease)    Aortic stenosis, mild    BPH (benign prostatic hypertrophy)    Spinal stenosis of lumbar region    Kidney stone    Sciatica    Afib    PAF (paroxysmal atrial fibrillation)        Social Hx:  No current use of tobacco, alcohol, or illicit drugs  Lives with ***    Medications:  MEDICATIONS  (STANDING):    MEDICATIONS  (PRN):      Vitals:  T(C): 36.6 (02-24-25 @ 23:25), Max: 36.9 (02-24-25 @ 17:15)  HR: 78 (02-24-25 @ 23:25) (63 - 78)  BP: 142/82 (02-24-25 @ 23:25) (142/82 - 173/90)  RR: 18 (02-24-25 @ 23:25) (18 - 19)  SpO2: 98% (02-24-25 @ 23:25) (98% - 100%)    Physical Examination:  General - NAD  Cardiovascular - no edema    Neurologic Exam:  Mental status - Awake, Alert, Oriented to person, place, and time. Speech fluent, repetition and naming intact. Follows simple and complex commands. Attention/concentration, recent and remote memory (including registration and recall), and fund of knowledge intact    Cranial nerves - Pupils were pharmacologically dilated during ophthalmology evaluation, L eye VFF, R eye visual fields diminised throughout. Visual acuity 20/20 in OS, Hand Motion in OD, EOMI, face sensation (V1-V3) intact b/l, facial strength intact without asymmetry b/l, hearing intact b/l, palate with symmetric elevation, trapezius 5/5 strength b/l, tongue midline on protrusion with full lateral movement    Motor - Normal bulk and tone throughout. No pronator drift.    Strength testing            Deltoid      Biceps      Triceps     Wrist Extension    Wrist Flexion     Interossei         R            5                 5               5                     5              5                        5                 5  L             5                 5               5                     5             5                        5                 5              Hip Flexion     Knee Flexion    Knee Extension    Dorsiflexion    Plantar Flexion  R              5                  5                           5                      5                          5  L             4++               4++                    4++                    5                          5    Sensation - LT intact throughout    DTR's -             Biceps      Triceps     Brachioradialis      Patellar    Ankle    Toes/plantar response  R             2+             2+                  2+               3+         2+                 Mute  L              2+             2+                 2+                3+         2+                 Mute    Coordination - No dysmetria b/l FTN or HTS    Gait and station - Deferred    Labs:                        14.0   5.55  )-----------( 197      ( 24 Feb 2025 17:58 )             44.0     02-24    140  |  109[H]  |  17  ----------------------------<  125[H]  3.8   |  28  |  1.17    Ca    9.4      24 Feb 2025 17:58    TPro  7.3  /  Alb  3.4  /  TBili  0.2  /  DBili  x   /  AST  10[L]  /  ALT  14  /  AlkPhos  93  02-24    CAPILLARY BLOOD GLUCOSE        LIVER FUNCTIONS - ( 24 Feb 2025 17:58 )  Alb: 3.4 g/dL / Pro: 7.3 gm/dL / ALK PHOS: 93 U/L / ALT: 14 U/L / AST: 10 U/L / GGT: x             PT/INR - ( 24 Feb 2025 17:58 )   PT: 10.2 sec;   INR: 0.86 ratio         PTT - ( 24 Feb 2025 17:58 )  PTT:29.0 sec  CSF:                  Radiology:     Neurology - Consult Note    -  Spectra: [phone] ([facility]), [phone] ([facility])  -    HPI: Pt is an 84 R Handed Male pmhx paroxysmal AFib (s/p ablation and ILR placement 12/13/2024, currently on Plavix every other day, see below for further history), CAD with multiple stents (most recent one 3-4 years ago), b/l cataracts, HTN, HLD, DM, L2-L5 spinal fusion in 2015 and then had some sort of repeat surgery in 10/2024, who presents to ED with R CRAO, for which neurology is consulted.    LKW 15:00 on 2/22/2025. Pt was visiting [place], and was sitting on a bench, when all of a sudden he noticed his R eye vision started to go out. It was a like a curtain, starting from the top and going towards the bottom, until his vision was completely out in the R eye. Since reaching that shana, his R eye vision has improved slightly (he can detect motion, but still cannot read 20/200 line). His L eye is unaffected. Of note, pt's last Plavix dose prior to CRAO was 2/20. Patient denies acute focal weakness, numbness/tingling, double vision, tinnitus, vertigo, gait instability, LoC, involuntary movements, slurred speech, dysphagia. Of note, pt does report mild L sided weakness that began after repeat spinal surgery in 10/2024.    Because of the R eye vision loss, pt saw an outpatient ophthalmologist affiliated with [facility] on 2/24. They detected R eye CRAO, for which they referred pt to [facility]. Pt underwent CTA H/N there, after which pt was transferred to [facility] for further workup. Inpatient ophthalmology team saw pt at [facility] and confirmed CRAO diagnosis.    Pt's history of AFib and blood thinners: Pt was diagnosed with AFib in approximately 2013, at which point he was prescribed Eliquis, which he was taking in addition to Plavix (initially qd, but then switched to every other day in 10/24). Pt underwent AFib ablation and ILR placement on 12/13/2024. In middle of January 2025, pt stopped taking Eliquis, and has since been taking Plavix every other day.     In the past, pt has taken other antiplatelet agents, such as Aspirin. However, pt was found to be anemic on Aspirin, for which pt underwent an endoscopy and there was concern for damage to stomach lining/GI Bleed. There was concern that the GI bleed could've resulted from either aspirin use (or Advil use for back pain), so he was switched from aspirin to Plavix. Pt has never clinically had melena or hematochezia.      Notable outpatient doctors (mostly through [facility]):  Dr. Elder Connolly - Electrophysiology  Dr. Lawson Carmona - Interventional Cardiologist  Dr. Jose Omalley - GI       Allergies:  No Known Allergies      PMHx/PSHx/Family Hx: As above, otherwise see below   Type 2 diabetes mellitus    Hypertension    Hyperlipidemia    Renal calculus    Renal cyst    CAD (coronary artery disease)    Aortic stenosis, mild    BPH (benign prostatic hypertrophy)    Spinal stenosis of lumbar region    Kidney stone    Sciatica    Afib    PAF (paroxysmal atrial fibrillation)        Social Hx:  No current use of tobacco, alcohol, or illicit drugs  Lives with ***    Medications:  MEDICATIONS  (STANDING):    MEDICATIONS  (PRN):      Vitals:  T(C): 36.6 (02-24-25 @ 23:25), Max: 36.9 (02-24-25 @ 17:15)  HR: 78 (02-24-25 @ 23:25) (63 - 78)  BP: 142/82 (02-24-25 @ 23:25) (142/82 - 173/90)  RR: 18 (02-24-25 @ 23:25) (18 - 19)  SpO2: 98% (02-24-25 @ 23:25) (98% - 100%)    Physical Examination:  General - NAD  Cardiovascular - no edema    Neurologic Exam:  Mental status - Awake, Alert, Oriented to person, place, and time. Speech fluent, repetition and naming intact. Follows simple and complex commands. Attention/concentration, recent and remote memory (including registration and recall), and fund of knowledge intact    Cranial nerves - Pupils were pharmacologically dilated during ophthalmology evaluation, L eye VFF, R eye visual fields diminised throughout. Visual acuity 20/20 in OS, Hand Motion in OD, EOMI, face sensation (V1-V3) intact b/l, facial strength intact without asymmetry b/l, hearing intact b/l, palate with symmetric elevation, trapezius 5/5 strength b/l, tongue midline on protrusion with full lateral movement    Motor - Normal bulk and tone throughout. No pronator drift.    Strength testing            Deltoid      Biceps      Triceps     Wrist Extension    Wrist Flexion     Interossei         R            5                 5               5                     5              5                        5                 5  L             5                 5               5                     5             5                        5                 5              Hip Flexion     Knee Flexion    Knee Extension    Dorsiflexion    Plantar Flexion  R              5                  5                           5                      5                          5  L             4++               4++                    4++                    5                          5    Sensation - LT intact throughout    DTR's -             Biceps      Triceps     Brachioradialis      Patellar    Ankle    Toes/plantar response  R             2+             2+                  2+               3+         2+                 Mute  L              2+             2+                 2+                3+         2+                 Mute    Coordination - No dysmetria b/l FTN or HTS    Gait and station - Deferred    Labs:                        14.0   5.55  )-----------( 197      ( 24 Feb 2025 17:58 )             44.0     02-24    140  |  109[H]  |  17  ----------------------------<  125[H]  3.8   |  28  |  1.17    Ca    9.4      24 Feb 2025 17:58    TPro  7.3  /  Alb  3.4  /  TBili  0.2  /  DBili  x   /  AST  10[L]  /  ALT  14  /  AlkPhos  93  02-24    CAPILLARY BLOOD GLUCOSE        LIVER FUNCTIONS - ( 24 Feb 2025 17:58 )  Alb: 3.4 g/dL / Pro: 7.3 gm/dL / ALK PHOS: 93 U/L / ALT: 14 U/L / AST: 10 U/L / GGT: x             PT/INR - ( 24 Feb 2025 17:58 )   PT: 10.2 sec;   INR: 0.86 ratio         PTT - ( 24 Feb 2025 17:58 )  PTT:29.0 sec  CSF:                  Radiology:

## 2025-02-25 NOTE — OCCUPATIONAL THERAPY INITIAL EVALUATION ADULT - ADDITIONAL COMMENTS
Pt lives in a private home with his wife with no steps to enter and 8 steps to main level inside the home. Pt's grandson lives in basement apartment of private home. Pt's bathroom has a walk-in stall shower with a shower chair. Pt reports being independent with all ADLs and ambulating with a cane prior to admission. Pt owns a rolling walker and wheelchair.   Pt left seated edge of bed in NAD, all lines intact,  (+) alarm, call bell in reach, RN aware.

## 2025-02-25 NOTE — CONSULT NOTE ADULT - SUBJECTIVE AND OBJECTIVE BOX
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 Pt states that he was on vacation in [place] and he suddenly developed right sided vision loss as if a shade came over his eye. Pt reports being able to motion and flashes of light but no other vision in the right eye. Denies headache, nausea, vomiting, arm/leg weakness and numbness, fevers, chills, chest pain, SOB. Pt initially presented to [facility] and then was transferred to [facility] for ophtho evaluation.    Of note pt had ablation for PVCs in December 2024. At that time he was told to d/c eliquis given no finding of afib on ILR interrogation. Additionally, pt was switched from ASA to plavix every other day because EGD noted gastrtitis. At that time pt was also taking ibuprofen for back pain which he notes he has since discontinued.    In ED VSS. (25 Feb 2025 11:29)      84yM was admitted on 02-25, seen today, son, visitor at bedside. Patient complains of change in vision, depth perception.       REVIEW OF SYSTEMS  Denies chest pain, SOB, N/V, F/C, abdominal pain     VITALS  T(C): 36.7 (02-25-25 @ 09:00), Max: 36.9 (02-24-25 @ 17:15)  HR: 66 (02-25-25 @ 09:00) (63 - 78)  BP: 147/84 (02-25-25 @ 09:00) (138/59 - 185/88)  RR: 16 (02-25-25 @ 09:00) (16 - 19)  SpO2: 98% (02-25-25 @ 09:00) (98% - 100%)  Wt(kg): --    PAST MEDICAL & SURGICAL HISTORY  Type 2 diabetes mellitus    Hypertension    Hyperlipidemia    Renal calculus    Renal cyst    CAD (coronary artery disease)    Aortic stenosis, mild    BPH (benign prostatic hypertrophy)    Spinal stenosis of lumbar region    Kidney stone    Sciatica    Afib    PAF (paroxysmal atrial fibrillation)    Stented coronary artery    S/P T&A (status post tonsillectomy and adenoidectomy)    H/O lithotripsy    S/P cataract surgery    S/P lumbar spinal fusion    H/O bilateral cataract extraction        FUNCTIONAL HISTORY  Lives with wife, steps to enter home  Independent ADLs, used cane     CURRENT FUNCTIONAL STATUS  PT  2/25  transfers independent   gait supervision with SC x 150 feet     OT 2/25  transfers supervision with SC     RECENT LABS/IMAGING  CBC Full  -  ( 25 Feb 2025 09:02 )  WBC Count : 5.34 K/uL  RBC Count : 4.95 M/uL  Hemoglobin : 13.8 g/dL  Hematocrit : 42.6 %  Platelet Count - Automated : 89 K/uL  Mean Cell Volume : 86.1 fL  Mean Cell Hemoglobin : 27.9 pg  Mean Cell Hemoglobin Concentration : 32.4 g/dL  Auto Neutrophil # : x  Auto Lymphocyte # : x  Auto Monocyte # : x  Auto Eosinophil # : x  Auto Basophil # : x  Auto Neutrophil % : x  Auto Lymphocyte % : x  Auto Monocyte % : x  Auto Eosinophil % : x  Auto Basophil % : x    02-25    138  |  105  |  15  ----------------------------<  99  4.2   |  22  |  0.93    Ca    8.7      25 Feb 2025 09:02  Mg     1.60     02-25    TPro  6.3  /  Alb  3.5  /  TBili  0.4  /  DBili  x   /  AST  12  /  ALT  9   /  AlkPhos  96  02-25    Urinalysis Basic - ( 25 Feb 2025 09:02 )    Color: x / Appearance: x / SG: x / pH: x  Gluc: 99 mg/dL / Ketone: x  / Bili: x / Urobili: x   Blood: x / Protein: x / Nitrite: x   Leuk Esterase: x / RBC: x / WBC x   Sq Epi: x / Non Sq Epi: x / Bacteria: x    < from: CT Angio Neck w/ IV Cont (02.24.25 @ 18:32) >    IMPRESSION:    CT HEAD:  1. No acute intracranial hemorrhage, mass effect, or midline shift.   Consider further evaluation via dedicated ophthalmologic assessment.  2. A partially empty sella turcica is incidentally noted. Consider   correlation with pituitary function studies.    CTA NECK:  No evidence of significant stenosis or occlusion.    CTA HEAD:  1. No large vessel occlusion.  2. Deposition of calcified plaque in association with the cavernous and   supraclinoid portions of the bilateral internal carotid arteries, with   mild stenosis bilaterally in these locations.  3. 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 There is a fetal originof the left   posterior cerebral artery.  4. No aneurysm identified. Tiny aneurysms can be beyond the resolution of   CTA technique.    < end of copied text >        ALLERGIES  No Known Allergies      MEDICATIONS   apixaban 5 milliGRAM(s) Oral two times a day  aspirin  chewable 81 milliGRAM(s) Oral daily  chlorhexidine 2% Cloths 1 Application(s) Topical daily  dextrose 5%. 1000 milliLiter(s) IV Continuous <Continuous>  dextrose 5%. 1000 milliLiter(s) IV Continuous <Continuous>  dextrose 50% Injectable 25 Gram(s) IV Push once  dextrose 50% Injectable 12.5 Gram(s) IV Push once  dextrose 50% Injectable 25 Gram(s) IV Push once  dextrose Oral Gel 15 Gram(s) Oral once PRN  doxazosin 2 milliGRAM(s) Oral at bedtime  glucagon  Injectable 1 milliGRAM(s) IntraMuscular once  hydrochlorothiazide 12.5 milliGRAM(s) Oral <User Schedule>  insulin lispro (ADMELOG) corrective regimen sliding scale   SubCutaneous three times a day before meals  insulin lispro (ADMELOG) corrective regimen sliding scale   SubCutaneous at bedtime  lisinopril 40 milliGRAM(s) Oral daily  metoprolol succinate  milliGRAM(s) Oral daily  pantoprazole    Tablet 40 milliGRAM(s) Oral before breakfast  traMADol 50 milliGRAM(s) Oral two times a day PRN      ----------------------------------------------------------------------------------------  PHYSICAL EXAM  Constitutional - NAD, Comfortable, in bed   Chest - Breathing comfortably on room air   Cardiovascular - S1S2   Extremities - No C/C/E, No calf tenderness   Neurologic Exam -    follows commands                 Cognitive - Awake, Alert, AAO to self, place, date, year, situation     Communication - Fluent, No dysarthria        Motor - No focal deficits                    LEFT    UE - ShAB 5/5, EF 5/5, EE 5/5, WE 5/5,  5/5                    RIGHT UE - ShAB 5/5, EF 5/5, EE 5/5, WE 5/5,  5/5                    LEFT    LE - HF 5/5, KE 5/5, DF 5/5, PF 5/5                    RIGHT LE - HF 5/5, KE 5/5, DF 5/5, PF 5/5        Sensory - Intact to LT     Psychiatric - Mood stable, Affect WNL  ----------------------------------------------------------------------------------------  ASSESSMENT/PLAN  84yMale h/o afib, CAD, HTN, DM with functional deficits after CRAO  CT angio as above, MRI pending   Pain - tramadol   DVT PPX - SCDs apixaban  Rehab -    patient seen by PT/OT, requires supervision/independent with mobility    expect patient can be discharged home when medically stable, has been going to outpatient therapy for back pain    Rehab recommendations are dependent on functional progress and participation with bedside therapy     Will continue to follow for ongoing rehab needs and recommendations.       55 minutes spent, reviewing hospital course, therapy notes, relevant imaging, exam, education about inpatient rehabilitation, documentation, and discussion with  and rehabilitation team

## 2025-02-25 NOTE — ED ADULT NURSE NOTE - NS ED NOTE ABUSE SUSPICION NEGLECT YN
HPI     Ms Tomer Coronado returns for follow-up after endoscopy and to schedule herniorrhaphy.     DATE OF PROCEDURE:  11/01/2021     ATTENDING SURGEON: Sunny Edwards MD     PCP: Olivia Pearson MD     PREOPERATIVE DIAGNOSIS:  History of colon polyps.     POSTOPERATIVE DIAGNOSES:  1.  Multiple polyps x9 (ascending x1, descending x1, sigmoid x5 rectum  x2). 2.  Extensive diverticulosis, right and left colon.     OPERATION:  1.  Colonoscopy, anus to cecum. 2.  Multiple polypectomies x9 (ascending x1, descending x1, sigmoid x5,  rectum x2).     INDICATIONS:  The patient is a pleasant 64-year-old white female who is  status post endoscopy in 07/2021, at which time she was found to have  two large polyps, sigmoid colon, sessile and pedunculated 20 and 50 cm  from the anal verge respectively which were benign adenomatous polyps. They were removed in their entirety and marked with tattoos. Lydiasteve Oroes bowel  prep was marginal at that time.  Other polyps were present, but deferred  to a later date with a better prep.  She returns at this time for repeat  colonoscopy.  No new complaints.     Evaluation 6/7/2021. ..     Ms Tomer Coronado returns for re-evaluation of incisional hernia. Kendall King is a 66 yo WF with an extensive medical and surgical history including significant vascular disease, substance abuse, HTN, bladder cancer, etc initially to me by Dr Ronaldo Diaz for evaluation of an ventral incisional hernia December 2019.  This has been present for years, but recently increased in size and become uncomfortable.  CT Dec 26, 2019 shows a less than 1 cm defect above the umbilicus, but 6 cm of herniated fat.  Current CT June 2021 shows that the fascial defect has increased to 1.6 x 1.3 cm, with 8.3 cm sac  containing omental fat.  She also has a left inguinal hernia.  Pain is worse with abdominal straining.  Neither previously repaired.  Extensive vascular surgery years ago.    She also has noticed blood per rectum, mostly on toilet tissue. 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    COLONOSCOPY N/A 7/22/2021     COLONOSCOPY POLYPECTOMY HOT BIOPSY WITH INK TATTOO performed by Yonatan Moreno MD at [address] COLONOSCOPY N/A 11/1/2021     COLONOSCOPY POLYPECTOMY HOT BIOPSY performed by Yonatan Moreno MD at [address] [place] [address]   5/12, 6/12     per Dr David Cormier   4-2016     TURBT    OTHER SURGICAL HISTORY         brachiocephalic-bypass    OTHER SURGICAL HISTORY         stenting of left internal carotid artery    UPPER GASTROINTESTINAL ENDOSCOPY N/A 7/22/2021     EGD POLYP SNARE performed by Yonatan Moreno MD at [facility]   5/12, 6/12     ken leg vacular surgery.  VULVA SURGERY   2001     cancer            Family History   Family History   Problem Relation Age of Onset    Cancer Father           lung    Cancer Mother              Allergies:  See list     Current Facility-Administered Medications          Current Outpatient Medications   Medication Sig Dispense Refill    Apoaequorin (PREVAGEN PO) Take by mouth        metoprolol tartrate (LOPRESSOR) 50 MG tablet TAKE 1 TABLET BY MOUTH TWICE DAILY 60 tablet 11    losartan (COZAAR) 50 MG tablet TAKE 1 TABLET BY MOUTH TWICE DAILY 60 tablet 11    hydrocortisone (WESTCORT) 0.2 % cream Apply topically 2 times daily.  15 g 1      No current facility-administered medications for this visit.            Social History   Social History            Socioeconomic History    Marital status:        Spouse name: Not on file    Number of children: Not on file    Years of education: Not on file    Highest education level: Not on file   Occupational History    Not on file   Tobacco Use    Smoking status: Former Smoker       Packs/day: 1.50       Years: 15.00       Pack years: 22.50       Types: Cigarettes       Quit date: 1993       Years since quittin.8    Smokeless tobacco: Never Used    Tobacco comment: QUIT    Vaping Use    Vaping Use: Never used   Substance and Sexual Activity    Alcohol use: Yes       Alcohol/week: 6.0 standard drinks       Types: 6 Cans of beer per week       Comment: occasional    Drug use: Yes       Types: Marijuana Michael Daslilia)       Comment: a couple times a day    Sexual activity: Yes       Partners: Male   Other Topics Concern    Not on file   Social History Narrative    Not on file      Social Determinants of Health          Financial Resource Strain: Low Risk     Difficulty of Paying Living Expenses: Not very hard   Food Insecurity: No Food Insecurity    Worried About Running Out of Food in the Last Year: Never true    Jameel of Food in the Last Year: Never true   Transportation Needs:     Lack of Transportation (Medical): Not on file    Lack of Transportation (Non-Medical):  Not on file   Physical Activity:     Days of Exercise per Week: Not on file    Minutes of Exercise per Session: Not on file   Stress:     Feeling of Stress : Not on file   Social Connections:     Frequency of Communication with Friends and Family: Not on file    Frequency of Social Gatherings with Friends and Family: Not on file    Attends Orthodox Services: Not on file    Active Member of Clubs or Organizations: Not on file    Attends Club or Organization Meetings: Not on file    Marital Status: Not on file   Intimate Partner Violence:     Fear of Current or Ex-Partner: Not on file    Emotionally Abused: Not on file    Physically Abused: Not on file    Sexually Abused: Not on file   Housing Stability:     Unable to Pay for Housing in the Last Year: Not on file    Number of Places Lived in the Last Year: Not on file    Unstable Housing in the Last Year: Not on file            [facility] 08/17/2011       Physical Exam  Vitals and nursing note reviewed. Constitutional:       Appearance: She is well-developed. HENT:      Head: Normocephalic and atraumatic. Eyes:      General: No scleral icterus. Conjunctiva/sclera: Conjunctivae normal.      Pupils: Pupils are equal, round, and reactive to light. Neck:      Vascular: No JVD. Trachea: No tracheal deviation. Cardiovascular:      Rate and Rhythm: Normal rate and regular rhythm. Pulmonary:      Effort: Pulmonary effort is normal. No respiratory distress. Chest:      Chest wall: No tenderness. Abdominal:      General: There is no distension. Palpations: Abdomen is soft. There is no mass. Tenderness: There is no abdominal tenderness. There is no guarding or rebound. Hernia: A hernia is present. Musculoskeletal:         General: Normal range of motion. Cervical back: Normal range of motion and neck supple. Lymphadenopathy:      Cervical: No cervical adenopathy. Skin:     General: Skin is warm and dry. Findings: No erythema or rash. Neurological:      Mental Status: She is alert and oriented to person, place, and time. Cranial Nerves: No cranial nerve deficit. Psychiatric:         Behavior: Behavior normal.         Thought Content: Thought content normal.         Judgment: Judgment normal.            IMAGING/LABS     SURGICAL PATHOLOGY REPORT  Order: [id number]   Status: Final result     Visible to patient: Yes (not seen)     Next appt: 01/13/2022 at 10:30 AM in General Surgery [place] MD Roxane)     0 Result Notes     Component 11/1/21 1538   Surgical Pathology Report -- Diagnosis --   1.  ASCENDING COLON, BIOPSIES:   - ADENOMATOUS POLYP (TUBULAR ADENOMA). 2.  DESCENDING COLON, BIOPSY:   - ADENOMATOUS POLYP (TUBULAR ADENOMA).      3.  SIGMOID COLON, BIOPSIES:   - ADENOMATOUS POLYPS (TUBULAR ADENOMAS). 4.  SIGMOID COLON, PRIOR POLYPECTOMY SITE, BIOPSIES:   - HYPERPLASTIC POLYP WITH SUBMUCOSAL TATTOO PIGMENT AND SCAR. 5.  RECTUM, BIOPSY:   - ADENOMATOUS POLYP (TUBULAR ADENOMA).        Lorenzo Miller M.D.   **Electronically Signed Out**         sls/11/2/2021         Clinical Information   Pre-op Diagnosis:  HISTORY COLON POLYPS, ELEVATED CEA   Operative Findings:  ASCENDING COLON POLYP; DESCENDING COLON POLYP;   SIGMOID POLYPS; SIGMOID PRIOR POLYPECTOMY SITE; RECTAL POLYP   Operation Performed:  COLONOSCOPY, POLYPECTOMY HOT BIOPSY     Source of Specimen   1: ASCENDING COLON POLYP   2: DESCENDING COLON POLYP   3: SIGMOID POLYPS   4: SIGMOID PRIOR POLYPECTOMY SITE   5: RECTAL POLYP     Gross Description   1.  \"OTILIA STEFANI, ASCENDING COLON POLYP\" Three tan-white tissue   fragments each 0.2 cm and are 0.4 x 0.4 x 0.2 cm in aggregate. Entirely 1cs. 2.  \"OTILIA STEFANI, DESCENDING COLON POLYP\" One tan-white tissue   fragment, 0.3 x 0.2 x 0.2 cm.  Entirely 1cs. 3.  \"OTILIA STEFANI, SIGMOID POLYPS\" Multiple tan-white tissue fragments   from 0.2 to 0.8 cm and are 1.8 x 1.2 x 0.4 cm in aggregate.  Entirely   1cs. 4.  \"OTILIA STEFANI, SIGMOID PRIOR POLYPECTOMY SITE\" Two tan-white tissue   fragments from 0.3 to 0.4 cm and are 0.4 x 0.4 x 0.2 cm in aggregate. Entirely 1cs. 5.  \"OTILIA STEFANI, RECTAL POLYP\" One tan-white tissue fragment, 0.5 x   0.3 x 0.2 cm.  Entirely 1cs.  mpb tm       Microscopic Description   1-5.  Microscopic examination performed. SURGICAL PATHOLOGY CONSULTATION         Patient Name: Aditya N[facility]ance [facility] Rec: [id number]   Path Number: [id number]     [address],  [address]. [place], [place]   [phone]   Fax: [phone]    Harry Dykes [address]               ASSESSMENT       Diagnosis Orders   1. Incisional hernia, without obstruction or gangrene      2.  S/P No

## 2025-02-25 NOTE — PHYSICAL THERAPY INITIAL EVALUATION ADULT - GENERAL OBSERVATIONS, REHAB EVAL
Pt encountered in seated at edge of bed in NAD, all lines intact, a&ox4, SPO2 100%, and RN Rosibel aware.

## 2025-02-25 NOTE — OCCUPATIONAL THERAPY INITIAL EVALUATION ADULT - NSOTDISCHREC_GEN_A_CORE
Outpatient OT for visual rehabilitation. Recommend supervision/assist with functional activities which pt reports family can provide./Outpatient OT

## 2025-02-26 ENCOUNTER — TRANSCRIPTION ENCOUNTER (OUTPATIENT)
Age: 85
End: 2025-02-26

## 2025-02-26 VITALS
RESPIRATION RATE: 18 BRPM | SYSTOLIC BLOOD PRESSURE: 150 MMHG | DIASTOLIC BLOOD PRESSURE: 86 MMHG | OXYGEN SATURATION: 99 % | TEMPERATURE: 98 F | HEART RATE: 60 BPM

## 2025-02-26 LAB
ANION GAP SERPL CALC-SCNC: 13 MMOL/L — SIGNIFICANT CHANGE UP (ref 7–14)
BUN SERPL-MCNC: 14 MG/DL — SIGNIFICANT CHANGE UP (ref 7–23)
CALCIUM SERPL-MCNC: 8.7 MG/DL — SIGNIFICANT CHANGE UP (ref 8.4–10.5)
CHLORIDE SERPL-SCNC: 105 MMOL/L — SIGNIFICANT CHANGE UP (ref 98–107)
CO2 SERPL-SCNC: 22 MMOL/L — SIGNIFICANT CHANGE UP (ref 22–31)
CREAT SERPL-MCNC: 1 MG/DL — SIGNIFICANT CHANGE UP (ref 0.5–1.3)
EGFR: 75 ML/MIN/1.73M2 — SIGNIFICANT CHANGE UP
GLUCOSE BLDC GLUCOMTR-MCNC: 114 MG/DL — HIGH (ref 70–99)
GLUCOSE BLDC GLUCOMTR-MCNC: 151 MG/DL — HIGH (ref 70–99)
GLUCOSE SERPL-MCNC: 104 MG/DL — HIGH (ref 70–99)
HCT VFR BLD CALC: 40 % — SIGNIFICANT CHANGE UP (ref 39–50)
HGB BLD-MCNC: 12.8 G/DL — LOW (ref 13–17)
MAGNESIUM SERPL-MCNC: 1.7 MG/DL — SIGNIFICANT CHANGE UP (ref 1.6–2.6)
MCHC RBC-ENTMCNC: 27.6 PG — SIGNIFICANT CHANGE UP (ref 27–34)
MCHC RBC-ENTMCNC: 32 G/DL — SIGNIFICANT CHANGE UP (ref 32–36)
MCV RBC AUTO: 86.2 FL — SIGNIFICANT CHANGE UP (ref 80–100)
MRSA PCR RESULT.: SIGNIFICANT CHANGE UP
NRBC # BLD AUTO: 0 K/UL — SIGNIFICANT CHANGE UP (ref 0–0)
NRBC # FLD: 0 K/UL — SIGNIFICANT CHANGE UP (ref 0–0)
NRBC BLD AUTO-RTO: 0 /100 WBCS — SIGNIFICANT CHANGE UP (ref 0–0)
PLATELET # BLD AUTO: 180 K/UL — SIGNIFICANT CHANGE UP (ref 150–400)
POTASSIUM SERPL-MCNC: 4.1 MMOL/L — SIGNIFICANT CHANGE UP (ref 3.5–5.3)
POTASSIUM SERPL-SCNC: 4.1 MMOL/L — SIGNIFICANT CHANGE UP (ref 3.5–5.3)
RBC # BLD: 4.64 M/UL — SIGNIFICANT CHANGE UP (ref 4.2–5.8)
RBC # FLD: 15.9 % — HIGH (ref 10.3–14.5)
S AUREUS DNA NOSE QL NAA+PROBE: SIGNIFICANT CHANGE UP
SODIUM SERPL-SCNC: 140 MMOL/L — SIGNIFICANT CHANGE UP (ref 135–145)
WBC # BLD: 5.01 K/UL — SIGNIFICANT CHANGE UP (ref 3.8–10.5)
WBC # FLD AUTO: 5.01 K/UL — SIGNIFICANT CHANGE UP (ref 3.8–10.5)

## 2025-02-26 PROCEDURE — 70551 MRI BRAIN STEM W/O DYE: CPT | Mod: 26

## 2025-02-26 PROCEDURE — 99233 SBSQ HOSP IP/OBS HIGH 50: CPT

## 2025-02-26 PROCEDURE — 99239 HOSP IP/OBS DSCHRG MGMT >30: CPT

## 2025-02-26 RX ORDER — ASPIRIN 325 MG
1 TABLET ORAL
Qty: 0 | Refills: 0 | DISCHARGE
Start: 2025-02-26

## 2025-02-26 RX ORDER — APIXABAN 2.5 MG/1
1 TABLET, FILM COATED ORAL
Qty: 0 | Refills: 0 | DISCHARGE
Start: 2025-02-26

## 2025-02-26 RX ADMIN — INSULIN LISPRO 1: 100 INJECTION, SOLUTION INTRAVENOUS; SUBCUTANEOUS at 12:19

## 2025-02-26 RX ADMIN — APIXABAN 5 MILLIGRAM(S): 2.5 TABLET, FILM COATED ORAL at 05:37

## 2025-02-26 RX ADMIN — TRAMADOL HYDROCHLORIDE 50 MILLIGRAM(S): 50 TABLET, FILM COATED ORAL at 08:48

## 2025-02-26 RX ADMIN — LISINOPRIL 40 MILLIGRAM(S): 5 TABLET ORAL at 05:38

## 2025-02-26 RX ADMIN — Medication 40 MILLIGRAM(S): at 05:37

## 2025-02-26 RX ADMIN — Medication 1 APPLICATION(S): at 11:08

## 2025-02-26 RX ADMIN — Medication 81 MILLIGRAM(S): at 11:08

## 2025-02-26 NOTE — DISCHARGE NOTE PROVIDER - PROVIDER TOKENS
PROVIDER:[TOKEN:[[id number]]],PROVIDER:[TOKEN:[[id number]]],PROVIDER:[TOKEN:[[id number]]],PROVIDER:[TOKEN:[[id number]]]

## 2025-02-26 NOTE — DISCHARGE NOTE NURSING/CASE MANAGEMENT/SOCIAL WORK - FINANCIAL ASSISTANCE
[facility] provides services at a reduced cost to those who are determined to be eligible through [facility]’s financial assistance program. Information regarding [facility]’s financial assistance program can be found by going to [email or web address] or by calling [phone].

## 2025-02-26 NOTE — DISCHARGE NOTE NURSING/CASE MANAGEMENT/SOCIAL WORK - NSDCPEFALRISK_GEN_ALL_CORE
For information on Fall & Injury Prevention, visit: [email or web address] OR  [email or web address] OR  https://www.cdc.gov/steadi/patient.html

## 2025-02-26 NOTE — DISCHARGE NOTE PROVIDER - CARE PROVIDER_API CALL
KELLY PRYOR  Follow Up Time:     Damián Yates  Cardiovascular Disease  [address] [place] [address]  Phone: [phone]  Fax: [phone]  Follow Up Time:     Emiliana Kwon  NP in Family Health  [address] [place] [address] [address]  [place] [address]  Phone: [phone]  Fax: [phone]  Follow Up Time:     Dg Collier  Family Medicine  [address] [place] [address]  Phone: [phone]  Fax: [phone]  Follow Up Time:

## 2025-02-26 NOTE — PROGRESS NOTE ADULT - ASSESSMENT
Pt is an 84 R Handed Male pmhx paroxysmal AFib (s/p ablation and ILR placement 12/13/2024, currently on Plavix every other day, see below for further history), CAD with multiple stents (most recent one 3-4 years ago), b/l cataracts, HTN, HLD, DM, L2-L5 spinal fusion in 2015 and then had some sort of repeat surgery in 10/2024, who presents to ED with R CRAO, for which neurology is consulted. Exam significant for R eye vision loss, as well as very subtle L sided weakness that is chronic since 10/2024. CTA H/N with mild bilateral cervical and intracranial ICA atherosclerosis, but no flow limiting stenosis or LVO. Will recommend admission for further workup and management. MR brain shows questionable diffusion restriction in right temporal, unlikely clinical infarct. TTE shows LVEF 65%, no thrombus noted. ILR interrogation shows no signs of Afib.     LKW 15:00 on 2/22/2025  NIHSS 0  preMRS 3 (uses cane)  Pt not IV tenecteplase candidate because outside of time window  Pt not thrombectomy candidate because outside of time window, and no LVO    CTH: No acute intracranial pathology  CTA H/N: Mild stenosis of bilateral cervical and intracranial ICAs. No LVO or flow limiting stenosis    Impression: Improving R eye vision loss 2/2 R CRAO, mechanism likely cardioembolic i/s/o AFib (may have recurred after Ablation, and pt is no longer on Eliquis)     Impression: Questionable diffusion restriction on MR seen in right temporal lobe, no correlate seen on clinical exam, likely artifact versus clinically silent infarct    Recommendations:  [ ] Would recommend starting Eliquis 5mg BID  [ ] From neurovascular standpoint, Plavix not needed for secondary stroke prevention if patient receiving Eliquis. Given patient's stents, would defer cardiac indications for antiplatelets to primary team/cardiology.  [ ] Atorvastatin 40-80 mg daily titrated per ASCVD risk  [ ] HgbA1C, fasting lipid panel, CBC, CMP, coag panel, troponin  [ ] telemetry to check for arrhythmia, EKG,    [ ] If concerns about GI bleed related to putting patient back on concomitant anticoagulation + antiplatelet, would recommend GI input regarding risk stratification  [ ] Appreciate ophtho recs  [ ] Rest of workup per primary team/respective consultants    - Glucose control   - Stroke education and counseling  - Neuro-checks and VS q4h  - BP Goal normotension (<130/80)   - Dysphagia screen. If fails, speech/swallow eval  - aspiration, fall precautions  - STAT CT head non-contrast for change in neuro exam.   - PT/ OT / DVT ppx per primary team    - No benefit to further neurovascular evaluation. From neurovascular standpoint, patient cleared for discharge.  [] Patient should follow up with Stroke NP, Emiliana Kwon or Aneta Helton, in clinic at [address] [place] [address]. Please email [email or web address] w/ basic PHI. ([phone]). If urgently (i.e. requires MRI), we will coordinate with Stroke  Agnieszka Crocker [email or web address] so that she can schedule an appointment with you.    Seen with stroke attending, Richard Libman Pt is an 84 R Handed Male pmhx paroxysmal AFib (s/p ablation and ILR placement 12/13/2024, currently on Plavix every other day, see below for further history), CAD with multiple stents (most recent one 3-4 years ago), b/l cataracts, HTN, HLD, DM, L2-L5 spinal fusion in 2015 and then had some sort of repeat surgery in 10/2024, who presents to ED with R CRAO, for which neurology is consulted. Exam significant for R eye vision loss, as well as very subtle L sided weakness that is chronic since 10/2024. CTA H/N with mild bilateral cervical and intracranial ICA atherosclerosis, but no flow limiting stenosis or LVO. Will recommend admission for further workup and management. MR brain shows questionable diffusion restriction in right temporal, unlikely clinical infarct. TTE shows LVEF 65%, no thrombus noted. ILR interrogation shows no signs of Afib.     LKW 15:00 on 2/22/2025  NIHSS 0  preMRS 3 (uses cane)  Pt not IV tenecteplase candidate because outside of time window  Pt not thrombectomy candidate because outside of time window, and no LVO    CTH: No acute intracranial pathology  CTA H/N: Mild stenosis of bilateral cervical and intracranial ICAs. No LVO or flow limiting stenosis    Impression: Slightly Improving R eye vision loss 2/2 R CRAO, mechanism likely cardioembolic i/s/o AFib (may have recurred after Ablation, and pt is no longer on Eliquis)     Impression: Questionable diffusion restriction on MR seen in right temporal lobe (either MCA vs PCA territory), no correlate seen on clinical exam, possibly artifact versus clinically silent infarct    Recommendations:  [ ] Would recommend starting Eliquis 5mg BID  [ ] From neurovascular standpoint, no role  for Plavix for  secondary stroke prevention if patient receiving Eliquis. Given patient's stents (several years old?), would defer cardiac indications for antiplatelets to primary team/cardiology.  [ ] Atorvastatin 40-80 mg daily titrated per ASCVD risk  [ ] HgbA1C, fasting lipid panel, CBC, CMP, coag panel, troponin  [ ] telemetry to check for arrhythmia, EKG,    [ ] If concerns about GI bleed related to putting patient back on concomitant anticoagulation + antiplatelet, would recommend GI input regarding risk stratification  [ ] Appreciate ophtho recs  [ ] Rest of workup per primary team/respective consultants    - Glucose control   - Stroke education and counseling  - Neuro-checks and VS q4h  - BP Goal normotension (<130/80)   - Dysphagia screen. If fails, speech/swallow eval  - aspiration, fall precautions  - STAT CT head non-contrast for change in neuro exam.   - PT/ OT / DVT ppx per primary team    - No benefit to further neurovascular evaluation. From neurovascular standpoint, patient cleared for discharge.  [] Patient should follow up with Stroke NP, Emiliana Kwon or Aneta Helton, in clinic at [address] [place] [address]. Please email [email or web address] w/ basic PHI. ([phone]). If urgently (i.e. requires MRI), we will coordinate with Stroke  Agnieszka [email or web address] so that she can schedule an appointment with you.    Seen with stroke attending, Richard Libman

## 2025-02-26 NOTE — PROGRESS NOTE ADULT - PROBLEM SELECTOR PLAN 7
DVT ppx: low risk  Diet: Consistent Card  Dispo: PT rec Outpatient PT  dc today, dc planning time spent in coordination 48mts ( preparing dc summary and plan)

## 2025-02-26 NOTE — DISCHARGE NOTE PROVIDER - HOSPITAL COURSE
84M PMHx HTN, HLD, T2DM, CAD s/p stents (last 3-4 years ago), Afib and PVCs s/p ablation December 2024 s/p ILR, chronic back pain s/p lumbar fusion with revision p/w R eye vision loss 2/2 CRAO. CTH: partially empty sella turcica, no acute process. CTA H/N: No large artery stenosis. calcified plaque in cavernous and supraclinoid portions of the bilateral internal carotid arteries, with mild stenosis bilaterally in these locations. 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 There is a fetal originof the left posterior cerebral artery. ILR interrogation: NSR. MRI Questionable small focus of diffusion restriction within the right temporal lobe versus artifact. Multiple patchy confluent nonspecific abnormal white matter foci of T2/FLAIR prolongation statistically favoring microvascular type changes and chronic lacunar infarcts. TTE bubble study negative, EF 65%, mild LV Diastolic dysfxn, mod-severe AS. Ophthalmology evaluated who performed exam that revealed VA HM OD with APD. Posterior segment significant for CRAO OD. Cleared pt for DC and recommended for him to follow up with his retina specialist. Neurology evaluated who stated R eye vision loss 2/2 R CRAO, mechanism likely cardioembolic i/s/o AFib. Recommended for pt to start Eliquis (pt has enough supply at home). Discussed this with his outpatient Cardiologist Dr. Yates who was in agreement with plan for eliquis and ASA and recommended to stop Plavix. Also discussed echo findings of moderate-severe AS --> per Cardiologist, last echo done in his office revealed mild AS however he does not have to any further evaluation done inpt for this worsening AS and instead he will f/u with him in the office. Pt has appt to see Dr. Yates in 2 weeks.    Case discussed with Dr. eLón on 2/26, pt medically stable for discharge home. PT recs outpt PT/OT- Pt declined OT and declined PT script as he already sees a physical therapist for back pain. Pt reports having Eliquis at home as he was previously on in and saved the bottle. Pt states he has an appt with his Cardiologist in 2 weeks who will send for refills at that time. States he has all other home meds. 84M PMHx HTN, HLD, T2DM, CAD s/p stents (last 3-4 years ago), Afib and PVCs s/p ablation December 2024 s/p ILR, chronic back pain s/p lumbar fusion with revision p/w R eye vision loss 2/2 CRAO. CTH: partially empty sella turcica, no acute process. CTA H/N: No large artery stenosis. calcified plaque in cavernous and supraclinoid portions of the bilateral internal carotid arteries, with mild stenosis bilaterally in these locations. 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 There is a fetal originof the left posterior cerebral artery. ILR interrogation: NSR. MRI Questionable small focus of diffusion restriction within the right temporal lobe versus artifact. Multiple patchy confluent nonspecific abnormal white matter foci of T2/FLAIR prolongation statistically favoring microvascular type changes and chronic lacunar infarcts. TTE bubble study negative, EF 65%, mild LV Diastolic dysfxn, mod-severe AS. Ophthalmology evaluated who performed exam that revealed VA HM OD with APD. Posterior segment significant for CRAO OD. Cleared pt for DC and recommended for him to follow up with his retina specialist. Neurology evaluated who stated  Slightly Improving R eye vision loss 2/2 R CRAO, mechanism likely cardioembolic i/s/o Kiki. Questionable diffusion restriction on MR seen in right temporal lobe (either MCA vs PCA territory), no correlate seen on clinical exam, possibly artifact versus clinically silent infarct. Recommended for pt to start Eliquis (pt has enough supply at home). Also recommended Atorvastatin however pt prefers to take his home medication Praluent as he gets muscle cramps with atorvsatin. Discussed this with his outpatient Cardiologist Dr. Yates who was in agreement with plan for eliquis and ASA and recommended to stop Plavix. Also discussed echo findings of moderate-severe AS --> per Cardiologist, last echo done in his office revealed mild AS however he does not have to any further evaluation done inpt for this worsening AS and instead he will f/u with him in the office. Pt has appt to see Dr. Yates in 2 weeks.    Case discussed with Dr. León on 2/26, pt medically stable for discharge home. PT recs outpt PT/OT- Pt declined OT and declined PT script as he already sees a physical therapist for back pain. Pt reports having Eliquis at home as he was previously on in and saved the bottle. Pt states he has an appt with his Cardiologist in 2 weeks who will send for refills at that time. States he has all other home meds. 84M PMHx HTN, HLD, T2DM, CAD s/p stents (last 3-4 years ago), Afib and PVCs s/p ablation December 2024 s/p ILR, chronic back pain s/p lumbar fusion with revision p/w R eye vision loss 2/2 CRAO. CTH: partially empty sella turcica, no acute process. CTA H/N: No large artery stenosis. calcified plaque in cavernous and supraclinoid portions of the bilateral internal carotid arteries, with mild stenosis bilaterally in these locations. 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 There is a fetal originof the left posterior cerebral artery. ILR interrogation: NSR. MRI Questionable small focus of diffusion restriction within the right temporal lobe versus artifact. Multiple patchy confluent nonspecific abnormal white matter foci of T2/FLAIR prolongation statistically favoring microvascular type changes and chronic lacunar infarcts. TTE bubble study negative, EF 65%, mild LV Diastolic dysfxn, mod-severe AS. Ophthalmology evaluated who performed exam that revealed VA HM OD with APD. Posterior segment significant for CRAO OD. Cleared pt for DC and recommended for him to follow up with his retina specialist. Neurology evaluated who stated  Slightly Improving R eye vision loss 2/2 R CRAO, mechanism likely cardioembolic i/s/o Kiki. Questionable diffusion restriction on MR seen in right temporal lobe (either MCA vs PCA territory), no correlate seen on clinical exam, possibly artifact versus clinically silent infarct. Recommended for pt to start Eliquis (pt has enough supply at home). Also recommended Atorvastatin however pt prefers to take his home medication Praluent as he gets muscle cramps with atorvsatin. Discussed this with his outpatient Cardiologist Dr. Yates who was in agreement with plan for eliquis and ASA and recommended to stop Plavix. Also discussed echo findings of moderate-severe AS --> per Cardiologist, last echo done in his office revealed mild AS however he does not have to any further evaluation done inpt for this worsening AS and instead he will f/u with him in the office. Pt has appt to see Dr. Yates in 2 weeks.  stable for dc    Case discussed with Dr. León on 2/26, pt medically stable for discharge home. PT recs outpt PT/OT- Pt declined OT and declined PT script as he already sees a physical therapist for back pain. Pt reports having Eliquis at home as he was previously on in and saved the bottle. Pt states he has an appt with his Cardiologist in 2 weeks who will send for refills at that time. States he has all other home meds.

## 2025-02-26 NOTE — PHARMACOTHERAPY INTERVENTION NOTE - COMMENTS
Discharge medications were reviewed with the patient. Current medication schedule was discussed in detail including: medication name, indication, dose, administration times, side effects, drug interactions, and special instructions. In particular, patient was educated on apixaban including the purpose and administration. Discussed adverse effects in detail and when to seek medical attention (blood in stool, vomit, etc.). Informed patient to notify all providers he is on this and before any planned procedures. Informed the patient to stop taking Plavix and take ASA instead. All questions and concerns were answered and addressed. Patient verbalized understanding and was provided with educational drug card.    Shea Steven, PharmD, BCPS  Clinical Pharmacy Specialist  Spectra [phone]

## 2025-02-26 NOTE — DISCHARGE NOTE PROVIDER - NSDCCPCAREPLAN_GEN_ALL_CORE_FT
PRINCIPAL DISCHARGE DIAGNOSIS  Diagnosis: CRAO (central retinal artery occlusion)  Assessment and Plan of Treatment: Evaluated by Ophthalmology who confirmed diagnosis of central retinal artery occlusion. Recommended for you to have close follow up with your retina specialist.  You were seen by Neurology for which you had a MRI that showed Questionable diffusion restriction seen in right temporal lobe. Per Neurology, findings may represent artifact versus clinically silent infarct as your neurological exam did not match up with findings. Recommended for you start Eliquis and continue aspirin. STOP Plavix.  Follow up with Emiliana Kwon Stroke NP, located at [address] [place] [address], [address], [place] [address]; the office will call you for an appointment. If you do not hear from them within 1 week then please call [phone] for appointment.      SECONDARY DISCHARGE DIAGNOSES  Diagnosis: CAD (coronary artery disease)  Assessment and Plan of Treatment: Since you are being started on Eliquis as stroke prevention, please stop plavix. You may resume baby aspirin.  Follow up with your Cardiologist.    Diagnosis: T2DM (type 2 diabetes mellitus)  Assessment and Plan of Treatment: Your Hemoglobin A1C is 6.3. Target goal for hemoglobin A1C is <7. Monitor blood glucose levels throughout the day before meals and at bedtime. Record blood sugars and bring to outpatient providers appointment in order to be reviewed by your doctor for management modifications. If your sugars are more than 400 or less than 70 you should contact your PCP immediately. Monitor for signs/symptoms of low blood glucose, such as, dizziness, altered mental status, or cool/clammy skin. In addition, monitor for signs/symptoms of high blood glucose, such as, feeling hot, dry, fatigued, or with increased thirst/urination. Make regular podiatry appointments in order to have feet checked for wounds and uncontrolled toe nail growth to prevent infections, as well as, appointments with an ophthalmologist to monitor your vision.    Diagnosis: Aortic stenosis  Assessment and Plan of Treatment: Echocardiogram showed moderate-severe aortic stenosis. Please monitor this closely with your Cardiologist. Follow up with Dr. Yates in 2 weeks.     PRINCIPAL DISCHARGE DIAGNOSIS  Diagnosis: CRAO (central retinal artery occlusion)  Assessment and Plan of Treatment: Evaluated by Ophthalmology who confirmed diagnosis of central retinal artery occlusion. Recommended for you to have close follow up with your retina specialist.  You were seen by Neurology for which you had a MRI that showed Questionable diffusion restriction seen in right temporal lobe. Per Neurology, findings may represent artifact versus clinically silent infarct as your neurological exam did not match up with findings. Recommended for you start Eliquis and continue aspirin to prevent future strokes. STOP Plavix.  Follow up with Emiliana Kwon Stroke NP, located at [address] [place] [address], [address], [place] [address]; the office will call you for an appointment. If you do not hear from them within 1 week then please call [phone] for appointment.      SECONDARY DISCHARGE DIAGNOSES  Diagnosis: T2DM (type 2 diabetes mellitus)  Assessment and Plan of Treatment: Your Hemoglobin A1C is 6.3. Target goal for hemoglobin A1C is <7. Monitor blood glucose levels throughout the day before meals and at bedtime. Record blood sugars and bring to outpatient providers appointment in order to be reviewed by your doctor for management modifications. If your sugars are more than 400 or less than 70 you should contact your PCP immediately. Monitor for signs/symptoms of low blood glucose, such as, dizziness, altered mental status, or cool/clammy skin. In addition, monitor for signs/symptoms of high blood glucose, such as, feeling hot, dry, fatigued, or with increased thirst/urination. Make regular podiatry appointments in order to have feet checked for wounds and uncontrolled toe nail growth to prevent infections, as well as, appointments with an ophthalmologist to monitor your vision.    Diagnosis: CAD (coronary artery disease)  Assessment and Plan of Treatment: Since you are being started on Eliquis as stroke prevention, please stop plavix. You may resume baby aspirin.  Follow up with your Cardiologist.    Diagnosis: Aortic stenosis  Assessment and Plan of Treatment: Echocardiogram showed moderate-severe aortic stenosis. Please monitor this closely with your Cardiologist. Follow up with Dr. Yates in 2 weeks.

## 2025-02-26 NOTE — DISCHARGE NOTE PROVIDER - NSFOLLOWUPCLINICS_GEN_ALL_ED_FT
[facility]  Ophthalmology  [address] [place] [address], [facility] [address]  [place] [address]  Phone: [phone]  Fax:

## 2025-02-26 NOTE — DISCHARGE NOTE PROVIDER - CARE PROVIDERS DIRECT ADDRESSES
[email or web address]

## 2025-02-26 NOTE — PROGRESS NOTE ADULT - SUBJECTIVE AND OBJECTIVE BOX
Patient is a 84y old  Male who presents with a chief complaint of Central Retinal Artery Occlusion (26 Feb 2025 12:17)      SUBJECTIVE / OVERNIGHT EVENTS: Pt seen and examined at 11:05am, no overnight events, pt reports that his peripheral vision is improving, denies any sob, chest pain or any other complaints, no other new issues reported, pt is aware of his AS and says that his cardiologist has been monitoring it.     MEDICATIONS  (STANDING):  apixaban 5 milliGRAM(s) Oral two times a day  aspirin  chewable 81 milliGRAM(s) Oral daily  chlorhexidine 2% Cloths 1 Application(s) Topical daily  dextrose 5%. 1000 milliLiter(s) (100 mL/Hr) IV Continuous <Continuous>  dextrose 5%. 1000 milliLiter(s) (50 mL/Hr) IV Continuous <Continuous>  dextrose 50% Injectable 25 Gram(s) IV Push once  dextrose 50% Injectable 12.5 Gram(s) IV Push once  dextrose 50% Injectable 25 Gram(s) IV Push once  doxazosin 2 milliGRAM(s) Oral at bedtime  glucagon  Injectable 1 milliGRAM(s) IntraMuscular once  hydrochlorothiazide 12.5 milliGRAM(s) Oral <User Schedule>  insulin lispro (ADMELOG) corrective regimen sliding scale   SubCutaneous three times a day before meals  insulin lispro (ADMELOG) corrective regimen sliding scale   SubCutaneous at bedtime  lisinopril 40 milliGRAM(s) Oral daily  metoprolol succinate  milliGRAM(s) Oral daily  pantoprazole    Tablet 40 milliGRAM(s) Oral before breakfast    MEDICATIONS  (PRN):  dextrose Oral Gel 15 Gram(s) Oral once PRN Blood Glucose LESS THAN 70 milliGRAM(s)/deciliter  traMADol 50 milliGRAM(s) Oral two times a day PRN moderate and/or severe pain      Vital Signs Last 24 Hrs  T(C): 36.4 (26 Feb 2025 12:00), Max: 36.9 (25 Feb 2025 21:40)  T(F): 97.6 (26 Feb 2025 12:00), Max: 98.5 (25 Feb 2025 21:40)  HR: 60 (26 Feb 2025 12:00) (60 - 68)  BP: 150/86 (26 Feb 2025 12:00) (121/71 - 166/82)  BP(mean): --  RR: 18 (26 Feb 2025 12:00) (16 - 18)  SpO2: 99% (26 Feb 2025 12:00) (97% - 99%)    Parameters below as of 26 Feb 2025 12:00  Patient On (Oxygen Delivery Method): room air      CAPILLARY BLOOD GLUCOSE      POCT Blood Glucose.: 151 mg/dL (26 Feb 2025 12:17)  POCT Blood Glucose.: 114 mg/dL (26 Feb 2025 08:17)  POCT Blood Glucose.: 98 mg/dL (25 Feb 2025 20:53)  POCT Blood Glucose.: 92 mg/dL (25 Feb 2025 17:51)  POCT Blood Glucose.: 120 mg/dL (25 Feb 2025 15:00)    I&O's Summary      PHYSICAL EXAM:  GENERAL: NAD  CHEST/LUNG: Clear to auscultation bilaterally; No wheeze  HEART: Regular rate and rhythm, +aaron  ABDOMEN: Soft, Nontender, Nondistended  EXTREMITIES: no LE edema  PSYCH: Calm  NEUROLOGY: AAOx3  SKIN: No rashes or lesions    LABS:                        12.8   5.01  )-----------( 180      ( 26 Feb 2025 05:42 )             40.0     02-26    140  |  105  |  14  ----------------------------<  104[H]  4.1   |  22  |  1.00    Ca    8.7      26 Feb 2025 05:42  Mg     1.70     02-26    TPro  6.3  /  Alb  3.5  /  TBili  0.4  /  DBili  x   /  AST  12  /  ALT  9   /  AlkPhos  96  02-25    PT/INR - ( 24 Feb 2025 17:58 )   PT: 10.2 sec;   INR: 0.86 ratio         PTT - ( 24 Feb 2025 17:58 )  PTT:29.0 sec      Urinalysis Basic - ( 26 Feb 2025 05:42 )    Color: x / Appearance: x / SG: x / pH: x  Gluc: 104 mg/dL / Ketone: x  / Bili: x / Urobili: x   Blood: x / Protein: x / Nitrite: x   Leuk Esterase: x / RBC: x / WBC x   Sq Epi: x / Non Sq Epi: x / Bacteria: x        RADIOLOGY & ADDITIONAL TESTS:  < from: MR Head No Cont (02.26.25 @ 00:27) >  MR BRAIN     < end of copied text >  < from: MR Head No Cont (02.26.25 @ 00:27) >  Questionable small focus of diffusion restriction within the   right temporal lobe versus artifact.    No acute intracranial hemorrhage.    Multiple patchy confluent nonspecific abnormal white matter foci of   T2/FLAIR prolongation statistically favoring microvascular type changes   and chronic lacunar infarcts, as discussed.      < end of copied text >  < from: TTE W or WO Ultrasound Enhancing Agent (02.25.25 @ 15:16) >    TRANSTHORACIC ECHOCARDIOGRAM REPORT    < end of copied text >  < from: TTE W or WO Ultrasound Enhancing Agent (02.25.25 @ 15:16) >  . Left ventricular cavity is normal in size. Left ventricular wall thickness is normal. Left ventricular systolic function is normal with an ejection fraction of 65 % by Hitchcock's method of disks. There are no regional wall motion abnormalities seen.   2. There is mild (grade 1) left ventricular diastolic dysfunction.   3. Normal right ventricular cavity size and normal right ventricular systolic function. Tricuspid annular plane systolic excursion (TAPSE) is 1.8 cm (normal >=1.7 cm).   4. Structurally normalmitral valve with normal leaflet excursion. There is calcification of the mitral valve annulus. There is mild mitral regurgitation.   5. The aortic valve appears trileaflet with reduced systolic excursion. There is calcification of the aortic valve leaflets. There is moderate to severe aortic stenosis. The peak transaortic velocity is 3.58 m/s, peak transaortic gradient is 51.3 mmHg and mean transaortic gradient is 28.6 mmHg with an LVOT/aortic valve VTI ratio of 0.31. The aortic valve area is estimated at 1.00 cm² by the continuity equation. There is trace aortic regurgitation.   6. Agitated saline injection was negative for intracardiac shunt.   7. No prior echocardiogram is available for comparison.      < end of copied text >    Imaging Personally Reviewed:    Consultant(s) Notes Reviewed:      Care Discussed with Consultants/Other Providers:

## 2025-02-26 NOTE — DISCHARGE NOTE PROVIDER - NSDCCPTREATMENT_GEN_ALL_CORE_FT
PRINCIPAL PROCEDURE  Procedure: MRI head  Findings and Treatment: FINDINGS: There is a questionable small rounded focus of diffusion   restriction versus artifact within the right temporal lobe (series 8,   image 44). No associated T2/FLAIR prolongation is seen in this area.  There is no acute intracranial hemorrhage.  Chronic lacunar infarcts are seen within the left cerebellar hemisphere   and left basal ganglia.  Multiple additional patchy confluent nonspecific T2/FLAIR hyperintense   signal changes are noted throughout the bihemispheric white matter   without associated mass effect or restricted diffusion.  Ventricular size and configuration is unremarkable. No abnormal   extra-axial fluid collections are seen. Flow-voids are noted throughout   the major intracranial vessels, on the T2 weighted images, consistent   with their patency. The sella turcica and posterior fossa are   unremarkable.  The paranasal sinuses and tympanomastoid cavities are clear. The   calvarium appears intact. The orbits appear unremarkable apart from   bilateral cataract removal.  IMPRESSION: Questionable small focus of diffusion restriction within the right temporal lobe versus artifact.  No acute intracranial hemorrhage.  Multiple patchy confluent nonspecific abnormal white matter foci of   T2/FLAIR prolongation statistically favoring microvascular type changes and chronic lacunar infarcts, as discussed.      SECONDARY PROCEDURE  Procedure: Complete transthoracic echocardiography (TTE)  Findings and Treatment:   CONCLUSIONS:      1. Left ventricular cavity is normal in size. Left ventricular wall thickness is normal. Left ventricular systolic function is normal with an ejection fraction of 65 % by Hitchcock's method of disks. There are no regional wall motion abnormalities seen.   2. There is mild (grade 1) left ventricular diastolic dysfunction.   3. Normal right ventricular cavity size and normal right ventricular systolic function. Tricuspid annular plane systolic excursion (TAPSE) is 1.8 cm (normal >=1.7 cm).   4. Structurally normal mitral valve with normal leaflet excursion. There is calcification of the mitral valve annulus. There is mild mitral regurgitation.   5. The aortic valve appears trileaflet with reduced systolic excursion. There is calcification of the aortic valve leaflets. There is moderate to severe aortic stenosis. The peak transaortic velocity is 3.58 m/s, peak transaortic gradient is 51.3 mmHg and mean transaortic gradient is 28.6 mmHg with an LVOT/aortic valve VTI ratio of 0.31. The aortic valve area is estimated at 1.00 cm² by the continuity equation. There is trace aortic regurgitation.   6. Agitated saline injection was negative for intracardiac shunt.   7. No prior echocardiogram is available for comparison.    Procedure: CTA head w/w/o contrast  Findings and Treatment: IMPRESSION:  CT HEAD:  1. No acute intracranial hemorrhage, mass effect, or midline shift.   Consider further evaluation via dedicated ophthalmologic assessment.  2. A partially empty sella turcica is incidentally noted. Consider   correlation with pituitary function studies.  CTA NECK:  No evidence of significant stenosis or occlusion.  CTA HEAD:  1. No large vessel occlusion.  2. Deposition of calcified plaque in association with the cavernous and   supraclinoid portions of the bilateral internal carotid arteries, with   mild stenosis bilaterally in these locations.  3. 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 There is a fetal origin of the left   posterior cerebral artery.  4. No aneurysm identified. Tiny aneurysms can be beyond the resolution of   CTA technique.

## 2025-02-26 NOTE — PROGRESS NOTE ADULT - PROBLEM SELECTOR PLAN 2
# Paroxysmal afib  s/p ablation December 2024  - ILR interrogation as above  - continue metoprolol  - started on eliquis 5mg BID    admitting team Discussed case with pts cardiologist Dr Yates who is in agreement with plan for eliquis and ASA

## 2025-02-26 NOTE — DISCHARGE NOTE PROVIDER - NSDCFUADDAPPT_GEN_ALL_CORE_FT
Follow up with your retina specialist Dr. Tru Manning in 1 week. If unable to follow up with your own retina specialist, you can follow up below:   [facility]  [facility] Department of Ophthalmology  [address]. [address]  [place] [address]   [phone]    Follow up with your Cardiologist.  Follow up with Stroke NP, Emiliana Kwon or Aneta Helton, in clinic at [address].

## 2025-02-26 NOTE — PROGRESS NOTE ADULT - PROBLEM SELECTOR PLAN 1
Pt with CRAO, confirmed by ophthalmology  - appreciate neuro and ophtho recs  - start eliquis 5mg BID given afib history  - MRI head showed Questionable small focus of diffusion restriction within the right temporal lobe versus artifact.  No acute intracranial hemorrhage. Multiple patchy confluent nonspecific abnormal white matter foci of   T2/FLAIR prolongation statistically favoring microvascular type changes and chronic lacunar infarcts, a  - ILR interrogated  -TTE showed  Left ventricular systolic function is normal, mild (grade 1) left ventricular diastolic dysfunction.  normal right ventricular systolic function.  moderate to severe aortic stenosis. Agitated saline injection was negative for intracardiac shunt.  - d/c plavix and started  on ASA  - pt on praluent so will defer statin for now  - q4h neuro checks  - continue tele monitoring  cleared by optho and neuro for dc

## 2025-02-26 NOTE — DISCHARGE NOTE PROVIDER - NSDCMRMEDTOKEN_GEN_ALL_CORE_FT
doxazosin 2 mg oral tablet: 1 tab(s) orally once a day (at bedtime)  glimepiride 1 mg oral tablet: 1 tab(s) orally once a day  hydroCHLOROthiazide 12.5 mg oral tablet: 1 tab(s) orally every other day  Janumet 50 mg-1000 mg oral tablet: 1 tab(s) orally 2 times a day  metoprolol succinate 200 mg oral tablet, extended release: 1 tab(s) orally once a day (in the evening)  pantoprazole 40 mg oral delayed release tablet: 1 tab(s) orally once a day  Plavix 75 mg oral tablet: 1 tab(s) orally every other day Directions per cardiology  Praluent Pen 75 mg/mL subcutaneous solution: 75 milligram(s) subcutaneously every 2 weeks  ramipril 10 mg oral capsule: 1 cap(s) orally once a day  traMADol 50 mg oral tablet: 1 tab(s) orally once a day (in the morning)  traMADol 50 mg oral tablet: 1 tab(s) orally once a day (at bedtime) as needed for  moderate pain   apixaban 5 mg oral tablet: 1 tab(s) orally 2 times a day  aspirin 81 mg oral tablet, chewable: 1 tab(s) orally once a day  doxazosin 2 mg oral tablet: 1 tab(s) orally once a day (at bedtime)  glimepiride 1 mg oral tablet: 1 tab(s) orally once a day  hydroCHLOROthiazide 12.5 mg oral tablet: 1 tab(s) orally every other day  Janumet 50 mg-1000 mg oral tablet: 1 tab(s) orally 2 times a day  metoprolol succinate 200 mg oral tablet, extended release: 1 tab(s) orally once a day (in the evening)  pantoprazole 40 mg oral delayed release tablet: 1 tab(s) orally once a day  Praluent Pen 75 mg/mL subcutaneous solution: 75 milligram(s) subcutaneously every 2 weeks  ramipril 10 mg oral capsule: 1 cap(s) orally once a day  traMADol 50 mg oral tablet: 1 tab(s) orally once a day (in the morning)  traMADol 50 mg oral tablet: 1 tab(s) orally once a day (at bedtime) as needed for  moderate pain

## 2025-02-26 NOTE — PROVIDER CONTACT NOTE (OTHER) - BACKGROUND
Patient is a 84 yr M with history of HTN, hyperlipidemia, DM, AF, chronic back pain, R eye vision and was admitted  central retinal artery occlusion.

## 2025-02-26 NOTE — PROGRESS NOTE ADULT - SUBJECTIVE AND OBJECTIVE BOX
PROGRESS NOTE:     Patient is a 84y old  Male who presents with a chief complaint of Central Retinal Artery Occlusion (25 Feb 2025 12:59)    INTERVAL HISTORY: Patient states peripheral vision is slightly improving, would like to go home, has OP f/u with cardiology and optho    MEDICATIONS  (STANDING):  apixaban 5 milliGRAM(s) Oral two times a day  aspirin  chewable 81 milliGRAM(s) Oral daily  chlorhexidine 2% Cloths 1 Application(s) Topical daily  dextrose 5%. 1000 milliLiter(s) (100 mL/Hr) IV Continuous <Continuous>  dextrose 5%. 1000 milliLiter(s) (50 mL/Hr) IV Continuous <Continuous>  dextrose 50% Injectable 25 Gram(s) IV Push once  dextrose 50% Injectable 12.5 Gram(s) IV Push once  dextrose 50% Injectable 25 Gram(s) IV Push once  doxazosin 2 milliGRAM(s) Oral at bedtime  glucagon  Injectable 1 milliGRAM(s) IntraMuscular once  hydrochlorothiazide 12.5 milliGRAM(s) Oral <User Schedule>  insulin lispro (ADMELOG) corrective regimen sliding scale   SubCutaneous three times a day before meals  insulin lispro (ADMELOG) corrective regimen sliding scale   SubCutaneous at bedtime  lisinopril 40 milliGRAM(s) Oral daily  metoprolol succinate  milliGRAM(s) Oral daily  pantoprazole    Tablet 40 milliGRAM(s) Oral before breakfast    MEDICATIONS  (PRN):  dextrose Oral Gel 15 Gram(s) Oral once PRN Blood Glucose LESS THAN 70 milliGRAM(s)/deciliter  traMADol 50 milliGRAM(s) Oral two times a day PRN moderate and/or severe pain      CAPILLARY BLOOD GLUCOSE      POCT Blood Glucose.: 114 mg/dL (26 Feb 2025 08:17)  POCT Blood Glucose.: 98 mg/dL (25 Feb 2025 20:53)  POCT Blood Glucose.: 92 mg/dL (25 Feb 2025 17:51)  POCT Blood Glucose.: 120 mg/dL (25 Feb 2025 15:00)  POCT Blood Glucose.: 149 mg/dL (25 Feb 2025 12:43)    I&O's Summary      PHYSICAL EXAM:  Vital Signs Last 24 Hrs  T(C): 36.8 (26 Feb 2025 09:10), Max: 36.9 (25 Feb 2025 21:40)  T(F): 98.2 (26 Feb 2025 09:10), Max: 98.5 (25 Feb 2025 21:40)  HR: 63 (26 Feb 2025 09:10) (60 - 68)  BP: 121/71 (26 Feb 2025 09:10) (121/71 - 166/82)  BP(mean): --  RR: 17 (26 Feb 2025 09:10) (16 - 18)  SpO2: 98% (26 Feb 2025 09:10) (97% - 99%)    Parameters below as of 26 Feb 2025 09:10  Patient On (Oxygen Delivery Method): room air        Physical Examination:  General - NAD  Neurologic Exam:  Mental status - Awake, Alert, Oriented to person, place, and time. Speech fluent. Follows simple commands.   Cranial nerves - L eye VFF, R eye visual fields diminised throughout, unable to see motion in right nasal field, in temporal field, able to make out motion but no fingers EOMI, facial strength intact without asymmetry b/l,     Motor - Normal bulk and tone throughout. No pronator drift. All extremities no gross deficit    Sensation - LT intact throughout    DTRs - deferred in focused exam    Coordination - No dysmetria b/l FTN    Gait and station - Deferred in focused exam    LABS:                        12.8   5.01  )-----------( 180      ( 26 Feb 2025 05:42 )             40.0     02-26    140  |  105  |  14  ----------------------------<  104[H]  4.1   |  22  |  1.00    Ca    8.7      26 Feb 2025 05:42  Mg     1.70     02-26    TPro  6.3  /  Alb  3.5  /  TBili  0.4  /  DBili  x   /  AST  12  /  ALT  9   /  AlkPhos  96  02-25    PT/INR - ( 24 Feb 2025 17:58 )   PT: 10.2 sec;   INR: 0.86 ratio         PTT - ( 24 Feb 2025 17:58 )  PTT:29.0 sec      Urinalysis Basic - ( 26 Feb 2025 05:42 )    Color: x / Appearance: x / SG: x / pH: x  Gluc: 104 mg/dL / Ketone: x  / Bili: x / Urobili: x   Blood: x / Protein: x / Nitrite: x   Leuk Esterase: x / RBC: x / WBC x   Sq Epi: x / Non Sq Epi: x / Bacteria: x          RADIOLOGY & ADDITIONAL TESTS:  MR Brain 2/25  IMPRESSION: Questionable small focus of diffusion restriction within the right temporal lobe versus artifact.  No acute intracranial hemorrhage.  Multiple patchy confluent nonspecific abnormal white matter foci of T2/FLAIR prolongation statistically favoring microvascular type changes and chronic lacunar infarcts, as discussed.        CT HEAD 2/24/25  1. No acute intracranial hemorrhage, mass effect, or midline shift. Consider further evaluation via dedicated ophthalmologic assessment.  2. A partially empty sella turcica is incidentally noted. Consider correlation with pituitary function studies.    CTA NECK 2/24/25  No evidence of significant stenosis or occlusion.    CTA HEAD 2/24/25  1. No large vessel occlusion.  2. Deposition of calcified plaque in association with the cavernous and supraclinoid portions of the bilateral internal carotid arteries, with mild stenosis bilaterally in these locations.  3. 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 There is a fetal origin of the left posterior cerebral artery.  4. No aneurysm identified. Tiny aneurysms can be beyond the resolution of CTA technique.    TTE 2/25/25  CONCLUSIONS:   1. Left ventricular cavity is normal in size. Left ventricular wall thickness is normal. Left ventricular systolic function is normal with an ejection fraction of 65 % by Hitchcock's method of disks. There are no regional wall motion abnormalities seen.   2. There is mild (grade 1) left ventricular diastolic dysfunction.   3. Normal right ventricular cavity size and normal right ventricular systolic function. Tricuspid annular plane systolic excursion (TAPSE) is 1.8 cm (normal >=1.7 cm).   4. Structurally normalmitral valve with normal leaflet excursion. There is calcification of the mitral valve annulus. There is mild mitral regurgitation.   5. The aortic valve appears trileaflet with reduced systolic excursion. There is calcification of the aortic valve leaflets. There is moderate to severe aortic stenosis. The peak transaortic velocity is 3.58 m/s, peak transaortic gradient is 51.3 mmHg and mean transaortic gradient is 28.6 mmHg with an LVOT/aortic valve VTI ratio of 0.31. The aortic valve area is estimated at 1.00 cm² by the continuity equation. There is trace aortic regurgitation.   6. Agitated saline injection was negative for intracardiac shunt.   7. No prior echocardiogram is available for comparison.   PROGRESS NOTE:     Patient is a 84y old  Male who presents with a chief complaint of Central Retinal Artery Occlusion (25 Feb 2025 12:59)    INTERVAL HISTORY: Patient states peripheral vision is slightly improving, would like to go home, has OP f/u with cardiology and optho    MEDICATIONS  (STANDING):  apixaban 5 milliGRAM(s) Oral two times a day  aspirin  chewable 81 milliGRAM(s) Oral daily  chlorhexidine 2% Cloths 1 Application(s) Topical daily  dextrose 5%. 1000 milliLiter(s) (100 mL/Hr) IV Continuous <Continuous>  dextrose 5%. 1000 milliLiter(s) (50 mL/Hr) IV Continuous <Continuous>  dextrose 50% Injectable 25 Gram(s) IV Push once  dextrose 50% Injectable 12.5 Gram(s) IV Push once  dextrose 50% Injectable 25 Gram(s) IV Push once  doxazosin 2 milliGRAM(s) Oral at bedtime  glucagon  Injectable 1 milliGRAM(s) IntraMuscular once  hydrochlorothiazide 12.5 milliGRAM(s) Oral <User Schedule>  insulin lispro (ADMELOG) corrective regimen sliding scale   SubCutaneous three times a day before meals  insulin lispro (ADMELOG) corrective regimen sliding scale   SubCutaneous at bedtime  lisinopril 40 milliGRAM(s) Oral daily  metoprolol succinate  milliGRAM(s) Oral daily  pantoprazole    Tablet 40 milliGRAM(s) Oral before breakfast    MEDICATIONS  (PRN):  dextrose Oral Gel 15 Gram(s) Oral once PRN Blood Glucose LESS THAN 70 milliGRAM(s)/deciliter  traMADol 50 milliGRAM(s) Oral two times a day PRN moderate and/or severe pain      CAPILLARY BLOOD GLUCOSE      POCT Blood Glucose.: 114 mg/dL (26 Feb 2025 08:17)  POCT Blood Glucose.: 98 mg/dL (25 Feb 2025 20:53)  POCT Blood Glucose.: 92 mg/dL (25 Feb 2025 17:51)  POCT Blood Glucose.: 120 mg/dL (25 Feb 2025 15:00)  POCT Blood Glucose.: 149 mg/dL (25 Feb 2025 12:43)    I&O's Summary      PHYSICAL EXAM:  Vital Signs Last 24 Hrs  T(C): 36.8 (26 Feb 2025 09:10), Max: 36.9 (25 Feb 2025 21:40)  T(F): 98.2 (26 Feb 2025 09:10), Max: 98.5 (25 Feb 2025 21:40)  HR: 63 (26 Feb 2025 09:10) (60 - 68)  BP: 121/71 (26 Feb 2025 09:10) (121/71 - 166/82)  BP(mean): --  RR: 17 (26 Feb 2025 09:10) (16 - 18)  SpO2: 98% (26 Feb 2025 09:10) (97% - 99%)    Parameters below as of 26 Feb 2025 09:10  Patient On (Oxygen Delivery Method): room air        Physical Examination:  General - NAD  Neurologic Exam:  Mental status - Awake, Alert, Oriented to person, place, and time. Speech fluent. Follows simple commands.   Cranial nerves - L eye VFF, R eye visual fields diminised throughout, unable to see hand motion in nasal field, in temporal field, able to make out hand  motion but not count  fingers EOMI, facial strength intact without asymmetry b/l,     Motor - Normal bulk and tone throughout. No pronator drift. All extremities no gross deficit    Sensation - LT intact throughout    DTRs - deferred in focused exam    Coordination - No dysmetria b/l FTN    Gait and station - Deferred in focused exam    LABS:                        12.8   5.01  )-----------( 180      ( 26 Feb 2025 05:42 )             40.0     02-26    140  |  105  |  14  ----------------------------<  104[H]  4.1   |  22  |  1.00    Ca    8.7      26 Feb 2025 05:42  Mg     1.70     02-26    TPro  6.3  /  Alb  3.5  /  TBili  0.4  /  DBili  x   /  AST  12  /  ALT  9   /  AlkPhos  96  02-25    PT/INR - ( 24 Feb 2025 17:58 )   PT: 10.2 sec;   INR: 0.86 ratio         PTT - ( 24 Feb 2025 17:58 )  PTT:29.0 sec      Urinalysis Basic - ( 26 Feb 2025 05:42 )    Color: x / Appearance: x / SG: x / pH: x  Gluc: 104 mg/dL / Ketone: x  / Bili: x / Urobili: x   Blood: x / Protein: x / Nitrite: x   Leuk Esterase: x / RBC: x / WBC x   Sq Epi: x / Non Sq Epi: x / Bacteria: x          RADIOLOGY & ADDITIONAL TESTS:  MR Brain 2/25  IMPRESSION: Questionable small focus of diffusion restriction within the right temporal lobe versus artifact.  No acute intracranial hemorrhage.  Multiple patchy confluent nonspecific abnormal white matter foci of T2/FLAIR prolongation statistically favoring microvascular type changes and chronic lacunar infarcts, as discussed.        CT HEAD 2/24/25  1. No acute intracranial hemorrhage, mass effect, or midline shift. Consider further evaluation via dedicated ophthalmologic assessment.  2. A partially empty sella turcica is incidentally noted. Consider correlation with pituitary function studies.    CTA NECK 2/24/25  No evidence of significant stenosis or occlusion.    CTA HEAD 2/24/25  1. No large vessel occlusion.  2. Deposition of calcified plaque in association with the cavernous and supraclinoid portions of the bilateral internal carotid arteries, with mild stenosis bilaterally in these locations.  3. 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 There is a fetal origin of the left posterior cerebral artery.  4. No aneurysm identified. Tiny aneurysms can be beyond the resolution of CTA technique.    TTE 2/25/25  CONCLUSIONS:   1. Left ventricular cavity is normal in size. Left ventricular wall thickness is normal. Left ventricular systolic function is normal with an ejection fraction of 65 % by Hitchcock's method of disks. There are no regional wall motion abnormalities seen.   2. There is mild (grade 1) left ventricular diastolic dysfunction.   3. Normal right ventricular cavity size and normal right ventricular systolic function. Tricuspid annular plane systolic excursion (TAPSE) is 1.8 cm (normal >=1.7 cm).   4. Structurally normalmitral valve with normal leaflet excursion. There is calcification of the mitral valve annulus. There is mild mitral regurgitation.   5. The aortic valve appears trileaflet with reduced systolic excursion. There is calcification of the aortic valve leaflets. There is moderate to severe aortic stenosis. The peak transaortic velocity is 3.58 m/s, peak transaortic gradient is 51.3 mmHg and mean transaortic gradient is 28.6 mmHg with an LVOT/aortic valve VTI ratio of 0.31. The aortic valve area is estimated at 1.00 cm² by the continuity equation. There is trace aortic regurgitation.   6. Agitated saline injection was negative for intracardiac shunt.   7. No prior echocardiogram is available for comparison.

## 2025-02-26 NOTE — DISCHARGE NOTE NURSING/CASE MANAGEMENT/SOCIAL WORK - PATIENT PORTAL LINK FT
You can access the FollowMyHealth Patient Portal offered by [facility] by registering at the following website: [email or web address]. By joining [facility]’s FollowMyHealth portal, you will also be able to view your health information using other applications (apps) compatible with our system.

## 2025-02-27 ENCOUNTER — NON-APPOINTMENT (OUTPATIENT)
Age: 85
End: 2025-02-27

## 2025-03-12 ENCOUNTER — TRANSCRIPTION ENCOUNTER (OUTPATIENT)
Age: 85
End: 2025-03-12

## 2025-07-30 NOTE — DISCHARGE NOTE PROVIDER - ATTENDING ATTESTATION STATEMENT
- Continue Metoprolol bid  - Hold Lisinopril    Code status- DNR. Ankur James has HPOA.  
- Continue Metoprolol bid  - Lisinopril restart.    Code status- DNR. Ankur James has HPOA.  
-Continue home Levothyroxine 150 mcg      
-Continue home Levothyroxine 150 mcg      
-Continue home Levothyroxine 150 mcg    Code status- DNR but zarina James currently ok with intubation.   
-Home Mounjaro on hold  -Sliding scale insulin  
-On Bipap at night.  - Continuous pulse oximetry to maintain SPO2>92%  - Jaquelin prn  -Aggressive pulmonary toileting  -Encourage incentive spirometry  -Will need BiPap at night  -Consult pulmonary  -Possible pneumonia- started on Azithromycin and Doxycyline  Patient became more confused and desaturated to 80's on BiPap so was transferred back to ICU.    
-On Bipap at night.  - Continuous pulse oximetry to maintain SPO2>92%  - Jaquelin prn  -Aggressive pulmonary toileting  -Encourage incentive spirometry-Using BiPap at night without problems.  -Pulmonary consult  -No evidence of pneumonia so antibiotics discontinued.  
-On Bipap at night.  - Continuous pulse oximetry to maintain SPO2>92%  - Jaquelin prn  -Aggressive pulmonary toileting  -Encourage incentive spirometry-Will need BiPap at night  -Consult pulmonary  -No evidence of pneumonia so antibiotics discontinued.  
Improving  Due to chronic CO2 retainer  -Continue Melaotnin at night  -Seroquel 50 mg bedtime for agitation and restlessness  -Continue Hydroxyzine 25 mg every 6 hours prn  -Avoid Benzo's  
Improving  Due to chronic CO2 retainer  -Continue Melaotnin at night  -Seroquel 50 mg bedtime for agitation and restlessness  -Continue Hydroxyzine 25 mg every 6 hours prn  -Avoid Benzo's  
Recurrent falls at home for over 7 months, work up negative on out patient basis for leg weakness, lumbar or cervical stenosis, and cardiac monitor has been uneventful.  -Implantable loop recorder to be placed 7/30.  EP Dr Bojorquez on consult.  
Recurrent falls at home for over 7 months, work up negative on out patient basis for leg weakness, lumbar or cervical stenosis, and cardiac monitor has been uneventful.  -Implantable loop recorder was to be on 7/30.  EP Dr Bojorquez on consult.  
Recurrent falls at home for over 7 months, work up negative on out patient basis for leg weakness, lumbar or cervical stenosis, and cardiac monitor was uneventful.  -Implantable loop recorder was to be placed today, but now on hold as transferred to ICU  EP Dr Bojorquez on consult.  
I have personally seen and examined the patient. I have collaborated with and supervised the